# Patient Record
Sex: FEMALE | Race: WHITE | Employment: OTHER | URBAN - METROPOLITAN AREA
[De-identification: names, ages, dates, MRNs, and addresses within clinical notes are randomized per-mention and may not be internally consistent; named-entity substitution may affect disease eponyms.]

---

## 2017-04-03 ENCOUNTER — APPOINTMENT (OUTPATIENT)
Dept: MAMMOGRAPHY | Age: 72
End: 2017-04-03

## 2017-04-03 ENCOUNTER — OFFICE VISIT (OUTPATIENT)
Dept: OBGYN CLINIC | Age: 72
End: 2017-04-03

## 2017-04-03 VITALS
BODY MASS INDEX: 23.24 KG/M2 | WEIGHT: 144.6 LBS | HEART RATE: 72 BPM | DIASTOLIC BLOOD PRESSURE: 105 MMHG | RESPIRATION RATE: 18 BRPM | HEIGHT: 66 IN | SYSTOLIC BLOOD PRESSURE: 141 MMHG

## 2017-04-03 DIAGNOSIS — Z01.419 WELL WOMAN EXAM WITH ROUTINE GYNECOLOGICAL EXAM: Primary | ICD-10-CM

## 2017-04-03 DIAGNOSIS — Z01.419 WELL WOMAN EXAM WITH ROUTINE GYNECOLOGICAL EXAM: ICD-10-CM

## 2017-04-03 DIAGNOSIS — Z12.31 ENCOUNTER FOR SCREENING MAMMOGRAM FOR MALIGNANT NEOPLASM OF BREAST: ICD-10-CM

## 2017-04-03 RX ORDER — CETIRIZINE HCL 10 MG
TABLET ORAL
COMMUNITY
End: 2020-07-04

## 2017-04-03 NOTE — PROGRESS NOTES
Annual exam ages 69+ note    Keli Greer is a No obstetric history on file. ,  66 y.o. female [facility] whose Patient's last menstrual period was 11/15/1990. was on  who presents for her annual checkup. She is having no significant problems. With regard to the Gardasil vaccine, she is older than the age for which it is FDA approved. Menstrual status:    Her periods are nonexistent in flow, patient is post menopausal.    She denies dysmenorrhea. Hormonal status:  She reports no perimenstrual type symptoms. She is not having vasomotor symptoms. The patient is not using any ERT. Sexual history:     reports that she currently engages in sexual activity and has had male partners. Medical conditions:    Since her last annual GYN exam about two years ago, she has not the following changes in her health history: none. Surgical history confirmed with patient. Pap and Mammogram History:    Her most recent Pap smear wasnormal obtained 6 year(s) ago. The patient had her mammogram today in our office. Breast Cancer History/Substance Abuse:    Family Medical/Cancer History:   Family History   Problem Relation Age of Onset    Diabetes Mother     Cancer Sister     Cancer Brother       Tobacco History:  reports that she has never smoked. She has never used smokeless tobacco.  Alcohol Abuse:  reports that she does not drink alcohol. Drug Abuse:  reports that she does not use illicit drugs. Family Medical/Cancer History:   Family History   Problem Relation Age of Onset    Diabetes Mother     Cancer Sister     Cancer Brother       Tobacco History:  reports that she has never smoked. She has never used smokeless tobacco.  Alcohol Abuse:  reports that she does not drink alcohol. Drug Abuse:  reports that she does not use illicit drugs. Osteoporosis History:    Family history does not include a first or second degree relative with osteopenia or osteoporosis.     A bone density scan was obtained 6 years ago and was within normal limits. Current Outpatient Prescriptions   Medication Sig Dispense Refill    cetirizine (ZYRTEC) 10 mg tablet Take  by mouth.  diclofenac EC (VOLTAREN) 75 mg EC tablet TAKE ONE TABLET BY MOUTH TWICE DAILY 60 Tab 2    loratadine (CLARITIN) 10 mg tablet Take 10 mg by mouth daily.  fluticasone (FLONASE) 50 mcg/actuation nasal spray 2 sprays per nostril once a day 1 Bottle 0    omeprazole (PRILOSEC) 40 mg capsule TAKE ONE CAPSULE BY MOUTH EVERY DAY 30 Cap 2     Allergies: Codeine   Social History     Social History    Marital status:      Spouse name: N/A    Number of children: N/A    Years of education: N/A     Occupational History    Not on file.      Social History Main Topics    Smoking status: Never Smoker    Smokeless tobacco: Never Used    Alcohol use No    Drug use: No    Sexual activity: Yes     Partners: Male     Other Topics Concern    Not on file     Social History Narrative     Patient Active Problem List   Diagnosis Code    Right shoulder pain M25.511    Pure hypercholesterolemia E78.00    Esophageal reflux K21.9    Cervical spondylosis M47.812    Chronic venous insufficiency I87.2    Unspecified vitamin D deficiency E55.9       Review of Systems - History obtained from the patient  Constitutional: negative for weight loss, fever, night sweats  HEENT: negative for hearing loss, earache, congestion, snoring, sorethroat  CV: negative for chest pain, palpitations, edema  Resp: negative for cough, shortness of breath, wheezing  GI: negative for change in bowel habits, abdominal pain, black or bloody stools  : negative for frequency, dysuria, hematuria, vaginal discharge  MSK: negative for back pain, joint pain, muscle pain  Breast: negative for breast lumps, nipple discharge, galactorrhea  Skin :negative for itching, rash, hives  Neuro: negative for dizziness, headache, confusion, weakness  Psych: negative for anxiety, depression, change in mood  Heme/lymph: negative for bleeding, bruising, pallor    Physical Exam    Visit Vitals    BP (!) 141/105 (BP 1 Location: Left arm, BP Patient Position: Sitting)    Pulse 72    Resp 18    Ht 5' 5.75\" (1.67 m)    Wt 144 lb 9.6 oz (65.6 kg)    LMP 11/15/1990    BMI 23.52 kg/m2     Constitutional  · Appearance: well-nourished, well developed, alert, in no acute distress    HENT  · Head and Face: appears normal    Neck  · Inspection/Palpation: normal appearance, no masses or tenderness  · Lymph Nodes: no lymphadenopathy present  · Thyroid: gland size normal, nontender, no nodules or masses present on palpation    Chest  · Respiratory Effort: breathing labored  · Auscultation:     Cardiovascular  · Heart:  · Auscultation:     Breasts  · Inspection of Breasts: breasts symmetrical, no skin changes, no discharge present, nipple appearance normal, no skin retraction present  · Palpation of Breasts and Axillae: no masses present on palpation, no breast tenderness  · Axillary Lymph Nodes: no lymphadenopathy present    Gastrointestinal  · Abdominal Examination: abdomen non-tender to palpation, normal bowel sounds, no masses present  · Liver and spleen: no hepatomegaly present, spleen not palpable  · Hernias: no hernias identified    Skin  · General Inspection: no rash, no lesions identified    Neurologic/Psychiatric  · Mental Status:  · Orientation: grossly oriented to person, place and time  · Mood and Affect: mood normal, affect appropriate    Genitourinary  · External Genitalia: normal appearance for age, no discharge present, no tenderness present, no inflammatory lesions present, no masses present, atrophy present  · Vagina: atrophic but otherwise normal vaginal vault without central or paravaginal defects, no discharge present, no inflammatory lesions present, no masses present  · Bladder: non-tender to palpation  · Urethra: appears normal  · Cervix: normal   · Uterus: normal size, shape and consistency  · Adnexa: no adnexal tenderness present, no adnexal masses present  · Perineum: perineum within normal limits, no evidence of trauma, no rashes or skin lesions present  · Anus: anus within normal limits, no hemorrhoids present  · Inguinal Lymph Nodes: no lymphadenopathy present    Assessment:  Routine gynecologic examination  Her current medical status is satisfactory with no evidence of significant gynecologic issues.     Plan:  Counseled re: diet, exercise, healthy lifestyle  Return for yearly wellness visits  Rec annual mammogram

## 2017-04-03 NOTE — PATIENT INSTRUCTIONS

## 2018-06-26 ENCOUNTER — OFFICE VISIT (OUTPATIENT)
Dept: OBGYN CLINIC | Age: 73
End: 2018-06-26

## 2018-06-26 VITALS
SYSTOLIC BLOOD PRESSURE: 129 MMHG | BODY MASS INDEX: 23.3 KG/M2 | DIASTOLIC BLOOD PRESSURE: 74 MMHG | HEART RATE: 78 BPM | WEIGHT: 145 LBS | RESPIRATION RATE: 19 BRPM | HEIGHT: 66 IN

## 2018-06-26 DIAGNOSIS — Z12.4 SCREENING FOR CERVICAL CANCER: Primary | ICD-10-CM

## 2018-06-26 RX ORDER — LATANOPROST 50 UG/ML
1 SOLUTION/ DROPS OPHTHALMIC DAILY
COMMUNITY
End: 2022-02-03 | Stop reason: ALTCHOICE

## 2018-06-26 RX ORDER — ERGOCALCIFEROL 1.25 MG/1
CAPSULE ORAL
COMMUNITY
End: 2021-05-04

## 2018-06-26 NOTE — PROGRESS NOTES
Kobe Chappell is a No obstetric history on file. ,  74 y.o. female [facility] whose LMP was on  who presents for her annual checkup. She is menopausal and amenorrheic. She is having no significant problems. Hormone Status:    She is not having vasomotor symptoms. The patient is not using HRT. She has not had any vaginal bleeding. She reports no gynecologic symptoms. Sexual history:    She  reports that she currently engages in sexual activity and has had male partners. Medical conditions:    Since her last annual GYN exam about one year ago, she has had the following changes in her health history: none. Surgical history confirmed with patient. has a past surgical history that includes hx salpingo-oophorectomy (Left). Pap and Mammogram History:    Her most recent Pap smear was normal obtained 7 year(s) ago. The patient had her mammogram today in our office    Breast Cancer History/Substance Abuse:     She does not have a family history of breast cancer. Osteoporosis History:    Family history does not include a first or second degree relative with osteopenia or osteoporosis. A bone density scan was obtained 8 years ago and revealed normal bone. Past Medical History:   Diagnosis Date    Allergic rhinitis due to other allergen     Arthritis     Hypercholesterolemia      Past Surgical History:   Procedure Laterality Date    HX SALPINGO-OOPHORECTOMY Left     at about age 26     Current Outpatient Prescriptions   Medication Sig Dispense Refill    ergocalciferol (ERGOCALCIFEROL) 50,000 unit capsule Take 1 capsule(s) every week by oral route      latanoprost (XALATAN) 0.005 % ophthalmic solution latanoprost 0.005 % eye drops      cetirizine (ZYRTEC) 10 mg tablet Take  by mouth.       diclofenac EC (VOLTAREN) 75 mg EC tablet TAKE ONE TABLET BY MOUTH TWICE DAILY 60 Tab 2     Allergies: Codeine   Social History     Social History    Marital status:      Spouse name: N/A    Number of children: N/A    Years of education: N/A     Occupational History    Not on file. Social History Main Topics    Smoking status: Never Smoker    Smokeless tobacco: Never Used    Alcohol use No    Drug use: No    Sexual activity: Yes     Partners: Male     Other Topics Concern    Not on file     Social History Narrative     Tobacco History:  reports that she has never smoked. She has never used smokeless tobacco.  Alcohol Abuse:  reports that she does not drink alcohol. Drug Abuse:  reports that she does not use illicit drugs.   Patient Active Problem List   Diagnosis Code    Right shoulder pain M25.511    Pure hypercholesterolemia E78.00    Esophageal reflux K21.9    Cervical spondylosis M47.812    Chronic venous insufficiency I87.2    Unspecified vitamin D deficiency E55.9       Review of Systems - History obtained from the patient  Constitutional: negative for weight loss, fever, night sweats  HEENT: negative for hearing loss, earache, congestion, snoring, sorethroat  CV: negative for chest pain, palpitations, edema  Resp: negative for cough, shortness of breath, wheezing  GI: negative for change in bowel habits, abdominal pain, black or bloody stools  : negative for frequency, dysuria, hematuria, vaginal discharge  MSK: negative for back pain, joint pain, muscle pain  Breast: negative for breast lumps, nipple discharge, galactorrhea  Skin :negative for itching, rash, hives  Neuro: negative for dizziness, headache, confusion, weakness  Psych: negative for anxiety, depression, change in mood  Heme/lymph: negative for bleeding, bruising, pallor    Physical Exam    Visit Vitals    /74 (BP 1 Location: Left arm, BP Patient Position: Sitting)    Pulse 78    Resp 19    Ht 5' 5.75\" (1.67 m)    Wt 145 lb (65.8 kg)    LMP 11/15/1990    BMI 23.58 kg/m2     Constitutional  · Appearance: well-nourished, well developed, alert, in no acute distress    HENT  · Head and Face: appears normal    Neck  · Inspection/Palpation: normal appearance, no masses or tenderness  · Lymph Nodes: no lymphadenopathy present    Chest  · Respiratory Effort: breathing normal    Breasts  · Inspection of Breasts: breasts symmetrical, no skin changes, no discharge present, nipple appearance normal, no skin retraction present  · Palpation of Breasts and Axillae: no masses present on palpation, no breast tenderness  · Axillary Lymph Nodes: no lymphadenopathy present    Gastrointestinal  · Abdominal Examination: abdomen non-tender to palpation, normal bowel sounds, no masses present  · Liver and spleen: no hepatomegaly present, spleen not palpable  · Hernias: no hernias identified    Genitourinary  · External Genitalia: normal appearance for age with atrophy, no discharge present, no tenderness present, no inflammatory lesions present, no masses present  · Vagina:atrophic vaginal vault with pale epithelium and flattening of rugae, without central or paravaginal defects, no discharge present, no inflammatory lesions present, no masses present  · Bladder: non-tender to palpation  · Urethra: appears normal  · Cervix: normal   · Uterus: normal size, shape and consistency  · Adnexa: no adnexal tenderness present, no adnexal masses present  · Perineum: perineum within normal limits, no evidence of trauma, no rashes or skin lesions present  · Anus: anus within normal limits, no hemorrhoids present  · Inguinal Lymph Nodes: no lymphadenopathy present    Skin  · General Inspection: no rash, no lesions identified    Neurologic/Psychiatric  · Mental Status:  · Orientation: grossly oriented to person, place and time  · Mood and Affect: mood normal, affect appropriate    . Assessment:  Routine gynecologic examination  Her current medical status is satisfactory with no evidence of significant gynecologic issues.     Plan:  Counseled re: diet, exercise, healthy lifestyle  Return for yearly wellness visits  Rec annual mammogram

## 2018-11-05 ENCOUNTER — TELEPHONE (OUTPATIENT)
Dept: OBGYN CLINIC | Age: 73
End: 2018-11-05

## 2018-11-05 NOTE — TELEPHONE ENCOUNTER
Calling to discuss billing from visit from 6/26/18 with Lazaro 73- message given to Bruce to call back when she is here.

## 2018-12-14 ENCOUNTER — HOSPITAL ENCOUNTER (OUTPATIENT)
Dept: MAMMOGRAPHY | Age: 73
Discharge: HOME | End: 2018-12-14
Attending: FAMILY MEDICINE
Payer: MEDICARE

## 2018-12-14 DIAGNOSIS — Z78.0 MENOPAUSE: ICD-10-CM

## 2018-12-14 PROCEDURE — 77080 DXA BONE DENSITY AXIAL: CPT

## 2019-12-13 ENCOUNTER — HOSPITAL ENCOUNTER (OUTPATIENT)
Dept: GENERAL RADIOLOGY | Age: 74
Discharge: HOME | End: 2019-12-13
Attending: FAMILY MEDICINE
Payer: MEDICARE

## 2019-12-13 DIAGNOSIS — R52 PAIN: ICD-10-CM

## 2019-12-13 PROCEDURE — 73564 X-RAY EXAM KNEE 4 OR MORE: CPT

## 2020-07-04 ENCOUNTER — HOSPITAL ENCOUNTER (EMERGENCY)
Age: 75
Discharge: HOME | End: 2020-07-04
Attending: EMERGENCY MEDICINE
Payer: MEDICARE

## 2020-07-04 ENCOUNTER — APPOINTMENT (OUTPATIENT)
Dept: GENERAL RADIOLOGY | Age: 75
End: 2020-07-04
Attending: EMERGENCY MEDICINE
Payer: MEDICARE

## 2020-07-04 VITALS
BODY MASS INDEX: 25.49 KG/M2 | WEIGHT: 153 LBS | HEIGHT: 65 IN | RESPIRATION RATE: 16 BRPM | OXYGEN SATURATION: 95 % | SYSTOLIC BLOOD PRESSURE: 129 MMHG | TEMPERATURE: 98.1 F | HEART RATE: 67 BPM | DIASTOLIC BLOOD PRESSURE: 58 MMHG

## 2020-07-04 DIAGNOSIS — S52.502A CLOSED FRACTURE OF DISTAL END OF LEFT RADIUS, UNSPECIFIED FRACTURE MORPHOLOGY, INITIAL ENCOUNTER: Primary | ICD-10-CM

## 2020-07-04 DIAGNOSIS — S52.612A TRAUMATIC CLOSED FRACTURE OF ULNAR STYLOID WITH MINIMAL DISPLACEMENT, LEFT, INITIAL ENCOUNTER: ICD-10-CM

## 2020-07-04 PROCEDURE — 99283 EMERGENCY DEPT VISIT LOW MDM: CPT

## 2020-07-04 PROCEDURE — 73080 X-RAY EXAM OF ELBOW: CPT

## 2020-07-04 PROCEDURE — 73110 X-RAY EXAM OF WRIST: CPT

## 2020-07-04 PROCEDURE — 75810000053 HC SPLINT APPLICATION

## 2020-07-04 NOTE — ED TRIAGE NOTES
Patient tripped while walking in her house and landed on her left arm/wrist. Area is slightly swollen. Arrived with a paint stirrer and gauze wrapped around the wrist. Charge nurse is removing the patient's rings currently.

## 2020-07-04 NOTE — DISCHARGE INSTRUCTIONS
Patient Education        Broken Arm: Care Instructions  Your Care Instructions  Fractures can range from a small, hairline crack, to a bone or bones broken into two or more pieces. Your treatment depends on how bad the break is. Your doctor may have put your arm in a splint or cast to allow it to heal or to keep it stable until you see another doctor. It may take weeks or months for your arm to heal. You can help your arm heal with some care at home. You heal best when you take good care of yourself. Eat a variety of healthy foods, and don't smoke. You may have had a sedative to help you relax. You may be unsteady after having sedation. It can take a few hours for the medicine's effects to wear off. Common side effects of sedation include nausea, vomiting, and feeling sleepy or tired. The doctor has checked you carefully, but problems can develop later. If you notice any problems or new symptoms, get medical treatment right away. Follow-up care is a key part of your treatment and safety. Be sure to make and go to all appointments, and call your doctor if you are having problems. It's also a good idea to know your test results and keep a list of the medicines you take. How can you care for yourself at home? · If the doctor gave you a sedative:  ? For 24 hours, don't do anything that requires attention to detail, such as going to work, making important decisions, or signing any legal documents. It takes time for the medicine's effects to completely wear off.  ? For your safety, do not drive or operate any machinery that could be dangerous. Wait until the medicine wears off and you can think clearly and react easily. · Put ice or a cold pack on your arm for 10 to 20 minutes at a time. Try to do this every 1 to 2 hours for the next 3 days (when you are awake). Put a thin cloth between the ice and your cast or splint. Keep the cast or splint dry. · Follow the cast care instructions your doctor gives you.  If you have a splint, do not take it off unless your doctor tells you to. · Be safe with medicines. Take pain medicines exactly as directed. ? If the doctor gave you a prescription medicine for pain, take it as prescribed. ? If you are not taking a prescription pain medicine, ask your doctor if you can take an over-the-counter medicine. · Prop up your arm on pillows when you sit or lie down in the first few days after the injury. Keep the arm higher than the level of your heart. This will help reduce swelling. · Follow instructions for exercises to keep your arm strong. · Wiggle your fingers and wrist often to reduce swelling and stiffness. When should you call for help? [id number] anytime you think you may need emergency care. For example, call if:  · You are very sleepy and you have trouble waking up. Call your doctor now or seek immediate medical care if:  · You have new or worse nausea or vomiting. · You have new or worse pain. · Your hand or fingers are cool or pale or change color. · Your cast or splint feels too tight. · You have tingling, weakness, or numbness in your hand or fingers. Watch closely for changes in your health, and be sure to contact your doctor if:  · You do not get better as expected. · You have problems with your cast or splint. Where can you learn more? Go to http://aleksandra-dimitry.info/  Enter D940 in the search box to learn more about \"Broken Arm: Care Instructions. \"  Current as of: March 2, 2020               Content Version: 12.5  © 2006-2020 [employer]. Care instructions adapted under license by [employer] (which disclaims liability or warranty for this information). If you have questions about a medical condition or this instruction, always ask your healthcare professional. [address] any warranty or liability for your use of this information.

## 2020-07-04 NOTE — ED PROVIDER NOTES
HPI       65y R-handed F here with L wrist pain. Yesterday (about 18 hours ago) she lost her footing and fell. She broke her fall with her L wrist. Now with ongoing pain/swelling. Also reports R elbow pain that is worse since yesterday although was having some prior to the fall as well. No weakness or numbness. No syncope. Didn't hit her head or have LOC with the fall.  is here with here and confirms the history.     Past Medical History:   Diagnosis Date    Allergic rhinitis due to other allergen     Arthritis     Hypercholesterolemia        Past Surgical History:   Procedure Laterality Date    HX SALPINGO-OOPHORECTOMY Left     at about age 26         Family History:   Problem Relation Age of Onset    Diabetes Mother     Cancer Sister     Cancer Brother        Social History     Socioeconomic History    Marital status:      Spouse name: Not on file    Number of children: Not on file    Years of education: Not on file    Highest education level: Not on file   Occupational History    Not on file   Social Needs    Financial resource strain: Not on file    Food insecurity     Worry: Not on file     Inability: Not on file    Transportation needs     Medical: Not on file     Non-medical: Not on file   Tobacco Use    Smoking status: Never Smoker    Smokeless tobacco: Never Used   Substance and Sexual Activity    Alcohol use: No    Drug use: No    Sexual activity: Yes     Partners: Male   Lifestyle    Physical activity     Days per week: Not on file     Minutes per session: Not on file    Stress: Not on file   Relationships    Social connections     Talks on phone: Not on file     Gets together: Not on file     Attends [employer] service: Not on file     Active member of club or organization: Not on file     Attends meetings of clubs or organizations: Not on file     Relationship status: Not on file    Intimate partner violence     Fear of current or ex partner: Not on file Emotionally abused: Not on file     Physically abused: Not on file     Forced sexual activity: Not on file   Other Topics Concern    Not on file   Social History Narrative    Not on file         ALLERGIES: Codeine    Review of Systems   Review of Systems   Constitutional: (-) weight loss. HEENT: (-) stiff neck   Eyes: (-) discharge. Respiratory: (-) cough. Cardiovascular: (-) syncope. Gastrointestinal: (-) blood in stool. Genitourinary: (-) hematuria. Musculoskeletal: (-) myalgias. Neurological: (-) seizure. Skin: (-) petechiae  Lymph/Immunologic: (-) enlarged lymph nodes  All other systems reviewed and are negative. Vitals:    07/04/20 1741   BP: 129/58   Pulse: 67   Resp: 16   Temp: 98.1 °F (36.7 °C)   SpO2: 95%   Weight: 69.4 kg (153 lb)   Height: 5' 5\" (1.651 m)            Physical Exam Nursing note and vitals reviewed. Constitutional: oriented to person, place, and time. appears well-developed and well-nourished. No distress. Head: Normocephalic and atraumatic. Sclera anicteric  Nose: No rhinorrhea  Mouth/Throat: Oropharynx is clear and moist. Pharynx normal  Eyes: Conjunctivae are normal. Pupils are equal, round, and reactive to light. Right eye exhibits no discharge. Left eye exhibits no discharge. Neck: Painless normal range of motion. Neck supple. No LAD. Cardiovascular: Normal rate, regular rhythm, normal heart sounds and intact distal pulses. Exam reveals no gallop and no friction rub. No murmur heard. Pulmonary/Chest:  No respiratory distress. No wheezes. No rales. No rhonchi. No increased work of breathing. No accessory muscle use. Good air exchange throughout. Abdominal: soft, non-tender, no rebound or guarding. No hepatosplenomegaly. Normal bowel sounds throughout. Back: no tenderness to palpation, no deformities, no CVA tenderness  Extremities/Musculoskeletal: L wrist with swelling and pain. R elbow with swelling.  Can give a thumbs up, spread her fingers apart, and make OK sign. Distal extremities are neurovasc intact. Lymphadenopathy:   No adenopathy. Neurological:  Alert and oriented to person, place, and time. Coordination normal. CN 2-12 intact. Motor and sensory function intact. Skin: Skin is warm and dry. No rash noted. No pallor. MDM 65y F here with L wrist and R elbow pain since a fall yesterday. neurovasc intact. Will check xrays.        Procedures

## 2020-07-04 NOTE — ED NOTES
See phone message for result note The patient was discharged home by Dr Aleks Roth in stable condition. The patient is alert and oriented, in no respiratory distress. The patient's diagnosis, condition and treatment were explained. The patient expressed understanding. A discharge plan has been developed. A  was not involved in the process. Aftercare instructions were given. Pt ambulatory out of the ED with .

## 2020-12-28 ENCOUNTER — TRANSCRIBE ORDER (OUTPATIENT)
Dept: SCHEDULING | Age: 75
End: 2020-12-28

## 2020-12-28 DIAGNOSIS — M81.0 SENILE OSTEOPOROSIS: ICD-10-CM

## 2020-12-28 DIAGNOSIS — Z12.31 SCREENING MAMMOGRAM FOR HIGH-RISK PATIENT: Primary | ICD-10-CM

## 2021-03-08 ENCOUNTER — HOSPITAL ENCOUNTER (OUTPATIENT)
Dept: LAB | Age: 76
Discharge: HOME | End: 2021-03-08
Payer: MEDICARE

## 2021-03-08 ENCOUNTER — TRANSCRIBE ORDER (OUTPATIENT)
Dept: REGISTRATION | Age: 76
End: 2021-03-08

## 2021-03-08 DIAGNOSIS — U07.1 COVID-19: ICD-10-CM

## 2021-03-08 DIAGNOSIS — Z20.822 ENCOUNTER FOR PREOPERATIVE SCREENING LABORATORY TESTING FOR COVID-19 VIRUS: Primary | ICD-10-CM

## 2021-03-08 DIAGNOSIS — Z01.812 ENCOUNTER FOR PREOPERATIVE SCREENING LABORATORY TESTING FOR COVID-19 VIRUS: ICD-10-CM

## 2021-03-08 DIAGNOSIS — Z01.812 ENCOUNTER FOR PREOPERATIVE SCREENING LABORATORY TESTING FOR COVID-19 VIRUS: Primary | ICD-10-CM

## 2021-03-08 DIAGNOSIS — Z20.822 ENCOUNTER FOR PREOPERATIVE SCREENING LABORATORY TESTING FOR COVID-19 VIRUS: ICD-10-CM

## 2021-03-08 PROCEDURE — U0005 INFEC AGEN DETEC AMPLI PROBE: HCPCS

## 2021-03-08 RX ORDER — ALENDRONATE SODIUM 35 MG/1
70 TABLET ORAL
COMMUNITY

## 2021-03-08 NOTE — PERIOP NOTES
[address]  Endoscopy Preprocedure Instructions      1. On the day of your surgery, please report to registration located on the 2nd floor of the  [facility]. yes    2. You must have a responsible adult to drive you to the hospital, stay at the hospital during your procedure and drive you home. If they leave your procedure will not be started (no exceptions). yes    3. Do not have anything to eat or drink (including water, gum, mints, coffee, and juice) after midnight. This does not apply to the medications you were instructed to take by your physician. yesIf you are currently taking Plavix, Coumadin, Aspirin, or other blood-thinning agents, contact your physician for special instructions. not applicable. 4. If you are having a procedure that requires bowel prep: We recommend that you have only clear liquids the day before your procedure and begin your bowel prep by 5:00 pm.  You may continue to drink clear liquids until midnight. If for any reason you are not able to complete your prep please notify your physician immediately. yes    5. Have a list of all current medications, including vitamins, herbal supplements and any other over the counter medications. yes    6. If you wear glasses, contacts, dentures and/or hearing aids, they may be removed prior to procedure, please bring a case to store them in. yes    7. You should understand that if you do not follow these instructions your procedure may be cancelled. If your physical condition changes (I.e. fever, cold or flu) please contact your doctor as soon as possible. 8. It is important that you be on time.   If for any reason you are unable to keep your appointment please call [phone] the day of or your physicians office prior to your scheduled procedure

## 2021-03-09 LAB — SARS-COV-2, COV2NT: NOT DETECTED

## 2021-03-12 ENCOUNTER — HOSPITAL ENCOUNTER (OUTPATIENT)
Age: 76
Setting detail: OUTPATIENT SURGERY
Discharge: HOME | End: 2021-03-12
Attending: SPECIALIST | Admitting: SPECIALIST
Payer: MEDICARE

## 2021-03-12 ENCOUNTER — ANESTHESIA EVENT (OUTPATIENT)
Dept: ENDOSCOPY | Age: 76
End: 2021-03-12
Payer: MEDICARE

## 2021-03-12 ENCOUNTER — ANESTHESIA (OUTPATIENT)
Dept: ENDOSCOPY | Age: 76
End: 2021-03-12
Payer: MEDICARE

## 2021-03-12 VITALS
OXYGEN SATURATION: 100 % | DIASTOLIC BLOOD PRESSURE: 85 MMHG | SYSTOLIC BLOOD PRESSURE: 145 MMHG | WEIGHT: 145.5 LBS | HEART RATE: 67 BPM | HEIGHT: 65 IN | TEMPERATURE: 97.6 F | BODY MASS INDEX: 24.24 KG/M2 | RESPIRATION RATE: 14 BRPM

## 2021-03-12 PROCEDURE — 77030021593 HC FCPS BIOP ENDOSC BSC -A: Performed by: SPECIALIST

## 2021-03-12 PROCEDURE — 76040000019: Performed by: SPECIALIST

## 2021-03-12 PROCEDURE — 74011250636 HC RX REV CODE- 250/636: Performed by: NURSE ANESTHETIST, CERTIFIED REGISTERED

## 2021-03-12 PROCEDURE — 74011000250 HC RX REV CODE- 250: Performed by: NURSE ANESTHETIST, CERTIFIED REGISTERED

## 2021-03-12 PROCEDURE — C1726 CATH, BAL DIL, NON-VASCULAR: HCPCS | Performed by: SPECIALIST

## 2021-03-12 PROCEDURE — 88305 TISSUE EXAM BY PATHOLOGIST: CPT

## 2021-03-12 PROCEDURE — 74011250636 HC RX REV CODE- 250/636: Performed by: SPECIALIST

## 2021-03-12 PROCEDURE — 76060000031 HC ANESTHESIA FIRST 0.5 HR: Performed by: SPECIALIST

## 2021-03-12 PROCEDURE — 2709999900 HC NON-CHARGEABLE SUPPLY: Performed by: SPECIALIST

## 2021-03-12 RX ORDER — ESCITALOPRAM OXALATE 10 MG/1
10 TABLET ORAL DAILY
COMMUNITY
End: 2021-08-17 | Stop reason: SDUPTHER

## 2021-03-12 RX ORDER — MIDAZOLAM HYDROCHLORIDE 1 MG/ML
.25-5 INJECTION, SOLUTION INTRAMUSCULAR; INTRAVENOUS AS NEEDED
Status: DISCONTINUED | OUTPATIENT
Start: 2021-03-12 | End: 2021-03-12 | Stop reason: HOSPADM

## 2021-03-12 RX ORDER — SODIUM CHLORIDE 9 MG/ML
INJECTION, SOLUTION INTRAVENOUS
Status: DISCONTINUED | OUTPATIENT
Start: 2021-03-12 | End: 2021-03-12 | Stop reason: HOSPADM

## 2021-03-12 RX ORDER — PROPOFOL 10 MG/ML
INJECTION, EMULSION INTRAVENOUS
Status: DISCONTINUED | OUTPATIENT
Start: 2021-03-12 | End: 2021-03-12 | Stop reason: HOSPADM

## 2021-03-12 RX ORDER — FLUMAZENIL 0.1 MG/ML
0.2 INJECTION INTRAVENOUS
Status: DISCONTINUED | OUTPATIENT
Start: 2021-03-12 | End: 2021-03-12 | Stop reason: HOSPADM

## 2021-03-12 RX ORDER — DEXTROMETHORPHAN/PSEUDOEPHED 2.5-7.5/.8
1.2 DROPS ORAL
Status: DISCONTINUED | OUTPATIENT
Start: 2021-03-12 | End: 2021-03-12 | Stop reason: HOSPADM

## 2021-03-12 RX ORDER — LIDOCAINE HYDROCHLORIDE 20 MG/ML
INJECTION, SOLUTION EPIDURAL; INFILTRATION; INTRACAUDAL; PERINEURAL AS NEEDED
Status: DISCONTINUED | OUTPATIENT
Start: 2021-03-12 | End: 2021-03-12 | Stop reason: HOSPADM

## 2021-03-12 RX ORDER — PROPOFOL 10 MG/ML
INJECTION, EMULSION INTRAVENOUS AS NEEDED
Status: DISCONTINUED | OUTPATIENT
Start: 2021-03-12 | End: 2021-03-12 | Stop reason: HOSPADM

## 2021-03-12 RX ORDER — SODIUM CHLORIDE 9 MG/ML
50 INJECTION, SOLUTION INTRAVENOUS CONTINUOUS
Status: DISCONTINUED | OUTPATIENT
Start: 2021-03-12 | End: 2021-03-12 | Stop reason: HOSPADM

## 2021-03-12 RX ORDER — NALOXONE HYDROCHLORIDE 0.4 MG/ML
0.4 INJECTION, SOLUTION INTRAMUSCULAR; INTRAVENOUS; SUBCUTANEOUS
Status: DISCONTINUED | OUTPATIENT
Start: 2021-03-12 | End: 2021-03-12 | Stop reason: HOSPADM

## 2021-03-12 RX ORDER — FENTANYL CITRATE 50 UG/ML
25 INJECTION, SOLUTION INTRAMUSCULAR; INTRAVENOUS AS NEEDED
Status: DISCONTINUED | OUTPATIENT
Start: 2021-03-12 | End: 2021-03-12 | Stop reason: HOSPADM

## 2021-03-12 RX ADMIN — SODIUM CHLORIDE: 9 INJECTION, SOLUTION INTRAVENOUS at 11:19

## 2021-03-12 RX ADMIN — LIDOCAINE HYDROCHLORIDE 60 MG: 20 INJECTION, SOLUTION INTRAVENOUS at 11:19

## 2021-03-12 RX ADMIN — PROPOFOL 70 MG: 10 INJECTION, EMULSION INTRAVENOUS at 11:20

## 2021-03-12 RX ADMIN — SODIUM CHLORIDE 50 ML/HR: 9 INJECTION, SOLUTION INTRAVENOUS at 11:08

## 2021-03-12 RX ADMIN — PROPOFOL 100 MCG/KG/MIN: 10 INJECTION, EMULSION INTRAVENOUS at 11:20

## 2021-03-12 NOTE — PROGRESS NOTES
Doni Blanchard  1945  [id number]    Situation:  Verbal report received from: Kendal Pabon RN   Procedure: Procedure(s):  ESOPHAGOGASTRODUODENOSCOPY (EGD)  ESOPHAGOGASTRODUODENAL (EGD) BIOPSY  ESOPHAGEAL DILATION    Background:    Preoperative diagnosis: DYSPHAGIA, EPIGASTRIC PAIN  Postoperative diagnosis: 1- Esophageal Ring. :  Dr. Yelitza Walden  Assistant(s): Endoscopy Technician-1: Jasen Rivera  Endoscopy RN-1: Abril Felipe    Specimens:   ID Type Source Tests Collected by Time Destination   1 : EG Junction Biopsy. Preservative   Kay Wiseman MD 3/12/2021 1127 Pathology     H. Pylori  no    Assessment:    Anesthesia gave intra-procedure sedation and medications, see anesthesia flow sheet no    Intravenous fluids: NS@ KVO     Vital signs stable     Abdominal assessment: round and soft     Recommendation:  Discharge patient per MD order.   Return to floor  Family or Friend   Permission to share finding with family or friend yes

## 2021-03-12 NOTE — ANESTHESIA PREPROCEDURE EVALUATION
Relevant Problems   GASTROINTESTINAL   (+) Esophageal reflux       Anesthetic History               Review of Systems / Medical History  Patient summary reviewed and nursing notes reviewed    Pulmonary          Shortness of breath         Neuro/Psych         Psychiatric history    Comments: Anxiety/depression  Occasional insomnia  Glaucoma  Mild dementia Cardiovascular                  Exercise tolerance: >4 METS     GI/Hepatic/Renal               Comments: Dysphagia Endo/Other        Arthritis     Other Findings              Physical Exam    Airway  Mallampati: II    Neck ROM: normal range of motion        Cardiovascular    Rhythm: regular  Rate: normal         Dental  No notable dental hx       Pulmonary  Breath sounds clear to auscultation               Abdominal         Other Findings            Anesthetic Plan    ASA: 3  Anesthesia type: MAC            Anesthetic plan and risks discussed with: Patient      Informed consent obtained.

## 2021-03-12 NOTE — PERIOP NOTES
Received report from Yuly Chilel CRNA. See anesthesia record. Patient taken to post-recovery. Post-recovery report given to Shayna Burdick RN. Patient's ABD remains soft and non-tender post procedure. Pt has no complaints at this time and tolerated the procedure well. Endoscope was pre-cleaned at bedside immediately following procedure by Mirella. CRE balloon dilatation of the esophagus   18 mm Balloon inflated to 3 ATMs and held for 10 seconds. 19 mm Balloon inflated to 4.5 ATMs and held for 10 seconds. 20 mm Balloon inflated to 6 ATMs and held for 10 seconds. No subcutaneous crepitus of the chest or cervical region was noted post dilatation.

## 2021-03-12 NOTE — INTERVAL H&P NOTE
Pre-Endoscopy H&P Update  Chief complaint/HPI/ROS:  The indication for the procedure, the patient's history and the patient's current medications are reviewed prior to the procedure and that data is reported on the H&P to which this document is attached. Any significant complaints with regard to organ systems will be noted, and if not mentioned then a review of systems is not contributory. Past Medical History:   Diagnosis Date    Allergic rhinitis due to other allergen     Arthritis     GERD (gastroesophageal reflux disease)     Hypercholesterolemia     Psychiatric disorder     anxiety    Seizures ([address]     \"with stress\" last was 2017      Past Surgical History:   Procedure Laterality Date    HX SALPINGO-OOPHORECTOMY Left     at about age 26     [address]      right wrist fracture. Social   Social History     Tobacco Use    Smoking status: Never Smoker    Smokeless tobacco: Never Used   Substance Use Topics    Alcohol use: No      Family History   Problem Relation Age of Onset    Diabetes Mother     Cancer Sister     Cancer Brother       Allergies   Allergen Reactions    Codeine Other (comments)     Unknown        Prior to Admission Medications   Prescriptions Last Dose Informant Patient Reported? Taking? alendronate (FOSAMAX) 35 mg tablet 3/5/2021 at Unknown time  Yes Yes   Sig: Take 70 mg by mouth every seven (7) days. diclofenac EC (VOLTAREN) 75 mg EC tablet 3/11/2021 at Unknown time  No Yes   Sig: TAKE ONE TABLET BY MOUTH TWICE DAILY   ergocalciferol (ERGOCALCIFEROL) 50,000 unit capsule Not Taking at Unknown time  Yes No   Sig: Take 1 capsule(s) every week by oral route   escitalopram oxalate (LEXAPRO) 10 mg tablet 3/10/2021 at Unknown time  Yes Yes   Sig: Take 10 mg by mouth daily.  Indications: anxiousness associated with depression   latanoprost (XALATAN) 0.005 % ophthalmic solution 3/11/2021 at Unknown time  Yes Yes   Sig: latanoprost 0.005 % eye drops   timoloL (BETIMOL) 0.25 % ophthalmic solution 3/11/2021 at Unknown time  Yes Yes   Sig: Administer 1-2 Drops to both eyes two (2) times a day. use in affected eye(s)      Facility-Administered Medications: None       PHYSICAL EXAM:  The patient is examined immediately prior to the procedure. Visit Vitals  BP (!) 153/71   Pulse 67   Temp 97.8 °F (36.6 °C)   Resp 18   Ht 5' 5\" (1.651 m)   Wt 66 kg (145 lb 8.1 oz)   SpO2 100%   Breastfeeding No   BMI 24.21 kg/m²     Gen: Appears comfortable, no distress. Pulm: comfortable respirations with no abnormal audible breath sounds  HEART: well perfused, no abnormal audible heart sounds  GI: abdomen flat. PLAN:  Informed consent discussion held, patient afforded an opportunity to ask questions and all questions answered. After being advised of the risks, benefits, and alternatives, the patient requested that we proceed and indicated so on a written consent form. Will proceed with procedure as planned.   Nicola Delgado MD

## 2021-03-12 NOTE — H&P
Date: 2021 3:15 PM   Patient Name: Nancy Scales   Account #: [id number]    Gender: Female    (age): 1945 (66)       Provider:     Magalie Flor. Maliha Rowland MD        Referring Physician:     Wayland Nissen. [address]  [phone] (phone)  [phone] (fax)        Chief Complaint: I Have this pain and trouble swallowing. History of Present Illness:   66year old who is up to date with colon cancer screening having had colonoscopy about 5 years ago describes bloating and upper abdominal discomfort, randomly, associated with a history of heartburn and periodic problems with food bolus arrest in the mid chest.  When this happens sometimes she'll try to drink water to alleviate and it will regurgitate and cause her to cough and sputter. We negotiated diagnostic, potentially therapeutic upper GI endoscopy. ? 66year old who is up to date with colon cancer screening having had colonoscopy about 5 years ago describes bloating and upper abdominal discomfort, randomly, associated with a history of heartburn and periodic problems with food bolus arrest in the mid chest.  When this happens sometimes she'll try to drink water to alleviate and it will regurgitate and cause her to cough and sputter. We negotiated diagnostic, potentially therapeutic upper GI endoscopy. ?        Past Medical History      Medical Conditions: Arthritis  Glaucoma  High cholesterol   Surgical Procedures: No Prior Procedures   Dx Studies: No Prior Diagnostic Studies   Medications: alendronate 70 mg TAKE 1 TABLET BY MOUTH ONCE A WEEK AS DIRECTED  escitalopram oxalate 10 mg TAKE 1 TABLET BY MOUTH ONCE DAILY  latanoprost 0.005% Instill as directed  rosuvastatin 10 mg TAKE 1 TABLET BY MOUTH ONCE DAILY  timolol maleate 0.5% INSTILL 1 DROP INTO EACH EYE TWICE DAILY   Allergies: Codeine Sulfate   Immunizations: Influenza, seasonal, injectable, 10/2020  Hep A  Hep B  Pneumococcal conjugate PCV 13 Social History      Alcohol: None   Tobacco: Never smoker   Drugs: None   Exercise: None   Caffeine: Daily. Marital Status:          Occupation:               Family History No history of Colon Cancer, Colon Polyps, Esophogeal Cancer, IBD (Crohn's or UC)  Sister: Diagnosed with cervical cancer;   Brother: Diagnosed with Liver disease;       Review of Systems:   Cardiovascular: Presents suffers from peripheral edema. Denies chest pain, irregular heart beat, palpitations, syncope, Sweats. Constitutional: Presents suffers from fatigue. Denies fever, loss of appetite, weight gain, weight loss. ENMT: Denies nose bleeds, sore throat, hearing loss. Endocrine: Presents suffers from heat intolerance. Denies excessive thirst.   Eyes: Denies loss of vision. Gastrointestinal: Presents suffers from abdominal pain, constipation, Bloating/gas, stomach cramps, dysphagia. Denies abdominal swelling, change in bowel habits, diarrhea, heartburn, jaundice, nausea, rectal bleeding, vomiting, rectal pain, Stool incontinence, hematemesis. Genitourinary: Denies dark urine, dysuria, frequent urination, hematuria, incontinence. Hematologic/Lymphatic: Presents suffers from easy bruising. Denies prolonged bleeding. Integumentary: Presents suffers from sun sensitivity. Denies itching, rashes. Musculoskeletal: Presents suffers from arthritis, stiffness. Denies back pain, gout, joint pain, muscle weakness. Neurological: Presents suffers from memory loss. Denies dizziness, fainting, frequent headaches. Psychiatric: Presents suffers from nervousness. Denies anxiety, depression, difficulty sleeping, hallucinations, panic attacks, paranoia. Respiratory: Presents suffers from cough, dyspnea. Denies wheezing.       Vital Signs:   BP  (mmHg)  Pulse  (ppm) Rhythm Weight (lbs/oz) Height (ft/in) BMI Temp   149/90 60 Regular 151 /  5 / 6 24.37 97.5 (F)      Physical Exam:   Constitutional:      Appearance: No distress, appears comfortable. Communication: Understands/receives spoken information. Skin:      Inspection: No rash, no jaundice. Head/face: Inspection: Normacephalic, atraumatic. Eyes:      Conjunctivae/lids: Normal.   Pupils/irises: Pupils equal, round and normal.   ENMT:      External: Normal.   Hearing: Normal.   Neck:      Neck: Normal appearance, trachea midline. Jugular veins: No JVD noted. Respiratory:      Effort: Normal respiratory effort, comfortable, speaks in complete sentences. Lab Results: No Electronic Results      Impressions: Esophageal dysphagia  Epigastric pain? ? Esophageal dysphagia? ?  ? ? Epigastric pain? Assessment: ?         Plan: Upper Endoscopy? ? Upper Endoscopy? Risk & Medical Necessity: The level of medical decision making for this visit is low. The number and complexity of problems addressed are moderate. The amount and/or complexity of data to be reviewed and analyzed is low. The risk of complications and/or morbidity or mortality of patient management is low. Notes:              Jael Guevara. Abbie French MD     Electronically signed on 2021 3:38:02 PM by Jael Guevara. Abbie French, [address] [place] [address], MRN [id number],  1945 First Visit, 2021                                                                                                                                                        New     Modify          Delete     Delete all     Edit Wording          Sign     [email or web address]

## 2021-03-12 NOTE — ANESTHESIA POSTPROCEDURE EVALUATION
Procedure(s):  ESOPHAGOGASTRODUODENOSCOPY (EGD)  ESOPHAGOGASTRODUODENAL (EGD) BIOPSY  ESOPHAGEAL DILATION. MAC    Anesthesia Post Evaluation      Multimodal analgesia: multimodal analgesia not used between 6 hours prior to anesthesia start to PACU discharge  Patient location during evaluation: PACU  Patient participation: complete - patient participated  Level of consciousness: awake and alert  Pain score: 0  Pain management: adequate  Airway patency: patent  Anesthetic complications: no  Cardiovascular status: hemodynamically stable and acceptable  Respiratory status: acceptable  Hydration status: acceptable  Comments: Patient seen and evaluated; no concerns. Post anesthesia nausea and vomiting:  none      INITIAL Post-op Vital signs:   Vitals Value Taken Time   /81 03/12/21 1146   Temp 36.4 °C (97.6 °F) 03/12/21 1139   Pulse 65 03/12/21 1148   Resp 17 03/12/21 1148   SpO2 99 % 03/12/21 1148   Vitals shown include unvalidated device data.

## 2021-03-12 NOTE — DISCHARGE INSTRUCTIONS
[address] [facility] LOIS Guallpa MD  [phone]      March 12, 2021     [address]: 1945    ENDOSCOPY DISCHARGE INSTRUCTIONS    If there is redness at IV site you should apply warm compress to area. If redness or soreness persist contact Dr. Brittany Guallpa' or your primary care doctor. Gaseous discomfort may develop, but walking, belching will help relieve this. You may not operate a vehicle for 12 hours  You may not operate machinery or dangerous appliances for rest of today  You may not drink alcoholic beverages for 12 hours  Avoid making any critical decisions for 24 hours    DIET:  You may resume your normal diet, but some patients find that heavy or large meals may lead to indigestion or vomiting. I suggest a light meal as first food intake. MEDICATIONS:  The use of some over-the-counter pain medication may lead to bleeding after biopsies or other procedures you may have had done. Tylenol (also called acetaminophen) is safe to take and will not lead to bleeding. Based on the procedure you had today you may not safely take aspirin or aspirin-like products for the next ten (10) days. ACTIVITY:  You may resume your normal household activities, but it is recommended that you spend the remainder of the day resting -  avoid any strenuous activity. CALL DR. Yuridia Tierney' OFFICE IF:  Increasing pain, nausea, vomiting  Abdominal distension (swelling)  Significant new or increased bleeding (oral or rectal)  Fever/Chills  Chest pain/shortness of breath                   Additional instructions:   No aspirin 10 days. We found a narrowing of the lower esophagus that looks like from acid reflux. I stretched that today. You should take acid reflux medication for 2 months. I sent a prescription to your pharmacy. It was an honor to be your doctor today.   Please let me or my office staff know if you have any feedback about today's procedure.     Hilario Aguilar MD

## 2021-03-12 NOTE — PROGRESS NOTES
Endoscopy discharge instructions have been reviewed and given to patient. The patient verbalized understanding and acceptance of instructions. Dr. Eva Hurtado discussed with patient procedure findings and next steps.

## 2021-03-12 NOTE — PROCEDURES
[address]  [phone]      2021    Esophagogastroduodenoscopy (EGD) Procedure Note  Ramya Urrutia  : 1945  [facility] Medical Record Number: [id number]      Indications:    Dysphagia/odynophagia  Referring Physician:  Malissa Malloy MD  Anesthesia/Sedation:  Conscious sedation/deep sedation/monitored anesthesia -- see notes. Endoscopist:  Dr. Kevyn Witt  Complications:  None  Estimated Blood Loss:  None    Permit:  The indications, risks, benefits and alternatives were reviewed with the patient or their decision maker who was provided an opportunity to ask questions and all questions were answered. The specific risks of esophagogastroduodenoscopy with conscious sedation were reviewed, including but not limited to anesthetic complication, bleeding, adverse drug reaction, missed lesion, infection, IV site reactions, and intestinal perforation which would lead to the need for surgical repair. Alternatives to EGD including radiographic imaging, observation without testing, or laboratory testing were reviewed as well as the limitations of those alternatives discussed. After considering the options and having all their questions answered, the patient or their decision maker provided both verbal and written consent to proceed. Procedure in Detail:  After obtaining informed consent, positioning of the patient in the left lateral decubitus position, and conduction of a pre-procedure pause or \"time out\" the endoscope was introduced into the mouth and advanced to the duodenum. A careful inspection was made, and findings or interventions are described below. Findings:   Esophagus:Esophageal ring acquired at EG junction, cold forceps biopsies then 20mm balloon dilation.   Stomach: normal   Duodenum/jejunum: normal      Specimens: See above    Impression: Esophageal ring, likely r/t GERD           Recommendations:  -Acid suppression with a proton pump inhibitor. , -Await pathology. Thank you for entrusting me with this patient's care. Please do not hesitate to contact me with any questions or if I can be of assistance with any of your other patients' GI needs. Signed By: Hilario Aguilar MD                        March 12, 2021     Surgical assistant none. Implants none unless specified.

## 2021-03-30 ENCOUNTER — HOSPITAL ENCOUNTER (OUTPATIENT)
Dept: MAMMOGRAPHY | Age: 76
Discharge: HOME | End: 2021-03-30
Attending: FAMILY MEDICINE
Payer: MEDICARE

## 2021-03-30 DIAGNOSIS — Z12.31 SCREENING MAMMOGRAM FOR HIGH-RISK PATIENT: ICD-10-CM

## 2021-03-30 DIAGNOSIS — M81.0 SENILE OSTEOPOROSIS: ICD-10-CM

## 2021-03-30 PROCEDURE — 77067 SCR MAMMO BI INCL CAD: CPT

## 2021-03-30 PROCEDURE — 77080 DXA BONE DENSITY AXIAL: CPT

## 2021-03-31 ENCOUNTER — TRANSCRIBE ORDER (OUTPATIENT)
Dept: SCHEDULING | Age: 76
End: 2021-03-31

## 2021-03-31 DIAGNOSIS — R92.8 ABNORMAL MAMMOGRAM: Primary | ICD-10-CM

## 2021-04-12 ENCOUNTER — HOSPITAL ENCOUNTER (OUTPATIENT)
Dept: MAMMOGRAPHY | Age: 76
Discharge: HOME | End: 2021-04-12
Attending: FAMILY MEDICINE
Payer: MEDICARE

## 2021-04-12 DIAGNOSIS — R92.8 ABNORMAL MAMMOGRAM: ICD-10-CM

## 2021-04-12 PROCEDURE — 77065 DX MAMMO INCL CAD UNI: CPT

## 2021-04-12 PROCEDURE — 76642 ULTRASOUND BREAST LIMITED: CPT

## 2021-04-16 ENCOUNTER — TRANSCRIBE ORDER (OUTPATIENT)
Dept: MAMMOGRAPHY | Age: 76
End: 2021-04-16

## 2021-04-16 ENCOUNTER — HOSPITAL ENCOUNTER (OUTPATIENT)
Dept: MAMMOGRAPHY | Age: 76
Discharge: HOME | End: 2021-04-16
Attending: FAMILY MEDICINE
Payer: MEDICARE

## 2021-04-16 DIAGNOSIS — N63.12 MASS OF UPPER INNER QUADRANT OF RIGHT BREAST: ICD-10-CM

## 2021-04-16 DIAGNOSIS — N63.12 MASS OF UPPER INNER QUADRANT OF RIGHT BREAST: Primary | ICD-10-CM

## 2021-04-16 PROCEDURE — 88360 TUMOR IMMUNOHISTOCHEM/MANUAL: CPT

## 2021-04-16 PROCEDURE — 88342 IMHCHEM/IMCYTCHM 1ST ANTB: CPT

## 2021-04-16 PROCEDURE — 77065 DX MAMMO INCL CAD UNI: CPT

## 2021-04-16 PROCEDURE — 88305 TISSUE EXAM BY PATHOLOGIST: CPT

## 2021-04-16 PROCEDURE — 19083 BX BREAST 1ST LESION US IMAG: CPT

## 2021-04-16 PROCEDURE — 74011000250 HC RX REV CODE- 250: Performed by: RADIOLOGY

## 2021-04-16 PROCEDURE — 88341 IMHCHEM/IMCYTCHM EA ADD ANTB: CPT

## 2021-04-16 PROCEDURE — 88313 SPECIAL STAINS GROUP 2: CPT

## 2021-04-16 RX ORDER — LIDOCAINE HYDROCHLORIDE 10 MG/ML
12 INJECTION INFILTRATION; PERINEURAL
Status: COMPLETED | OUTPATIENT
Start: 2021-04-16 | End: 2021-04-16

## 2021-04-16 RX ORDER — SODIUM BICARBONATE 42 MG/ML
3 INJECTION, SOLUTION INTRAVENOUS
Status: COMPLETED | OUTPATIENT
Start: 2021-04-16 | End: 2021-04-16

## 2021-04-16 RX ADMIN — SODIUM BICARBONATE 126 MG: 42 SOLUTION INTRAVENOUS at 14:25

## 2021-04-16 RX ADMIN — LIDOCAINE HYDROCHLORIDE 12 ML: 10 INJECTION, SOLUTION INFILTRATION; PERINEURAL at 14:25

## 2021-04-16 NOTE — PROGRESS NOTES
Patient tolerated procedure well. Minimal bleeding noted. Pressure held to biopsy site for 5 minutes. No oozing, bleeding or hematoma noted. Patient sent for post biopsy mammogram.  Dressing remained dry and intact  Post biopsy discharge instructions reviewed with patient and patient given a copy. Ice pack applied over transparent dressing. Patient expects to be contacted by phone with pathology result.

## 2021-04-20 NOTE — PROGRESS NOTES
Pathology results in and reviewed by Dr Juan Alberto Enrique and faxed to Dr Doug Stephenson's office. Patient contacted with pathology results and surgical consult appointment with Namrata Miguel. Patient made aware of appointment date time and location.

## 2021-04-21 PROBLEM — C50.911 BREAST CANCER, RIGHT (HCC): Status: ACTIVE | Noted: 2021-04-21

## 2021-04-22 ENCOUNTER — OFFICE VISIT (OUTPATIENT)
Dept: SURGERY | Age: 76
End: 2021-04-22
Payer: MEDICARE

## 2021-04-22 ENCOUNTER — TELEPHONE (OUTPATIENT)
Dept: SURGERY | Age: 76
End: 2021-04-22

## 2021-04-22 VITALS
HEIGHT: 65 IN | SYSTOLIC BLOOD PRESSURE: 133 MMHG | BODY MASS INDEX: 24.16 KG/M2 | DIASTOLIC BLOOD PRESSURE: 73 MMHG | WEIGHT: 145 LBS | HEART RATE: 62 BPM

## 2021-04-22 DIAGNOSIS — C50.411 MALIGNANT NEOPLASM OF UPPER-OUTER QUADRANT OF RIGHT BREAST IN FEMALE, ESTROGEN RECEPTOR NEGATIVE (HCC): Primary | ICD-10-CM

## 2021-04-22 DIAGNOSIS — Z17.1 MALIGNANT NEOPLASM OF UPPER-OUTER QUADRANT OF RIGHT BREAST IN FEMALE, ESTROGEN RECEPTOR NEGATIVE (HCC): Primary | ICD-10-CM

## 2021-04-22 PROCEDURE — 99204 OFFICE O/P NEW MOD 45 MIN: CPT | Performed by: SURGERY

## 2021-04-22 PROCEDURE — 1090F PRES/ABSN URINE INCON ASSESS: CPT | Performed by: SURGERY

## 2021-04-22 NOTE — TELEPHONE ENCOUNTER
Spoke with Denice at Dr Kaylyn Lange office, to schedule visit for consult. Next available appointment is 5/11/21 at 1:00 pm. Will forward  Records to Dr Felicia Carreon to screen for sooner office visit.

## 2021-04-22 NOTE — LETTER
4/22/2021    Patient: nAtonia Morales   YOB: 1945   Date of Visit: 4/22/2021     Tai Petersen MD  [id number] [facility] [address] [phone]  Via Fax: [phone]    Dear Tai Petersen MD,      Thank you for referring Ms. Rowena Schneider to [address] [facility] for evaluation. My notes for this consultation are attached. If you have questions, please do not hesitate to call me. I look forward to following your patient along with you.       Sincerely,    Yoselyn Ovalles MD

## 2021-04-22 NOTE — PATIENT INSTRUCTIONS
Breast Cancer: Care Instructions Your Care Instructions Breast cancer occurs when abnormal cells grow out of control in the breast. These cancer cells can spread within the breast, to nearby lymph nodes and other tissues, and to other parts of the body. Being treated for cancer can weaken your body, and you may feel very tired. Get the rest your body needs so you can feel better. Finding out that you have cancer is scary. You may feel many emotions and may need some help coping. Seek out family, friends, and counselors for support. You also can do things at home to make yourself feel better while you go through treatment. Call the [employer] (5-626.729.6285) or visit its website at [employer] for more information. Follow-up care is a key part of your treatment and safety. Be sure to make and go to all appointments, and call your doctor if you are having problems. It's also a good idea to know your test results and keep a list of the medicines you take. How can you care for yourself at home? · Take your medicines exactly as prescribed. Call your doctor if you think you are having a problem with your medicine. You may get medicine for nausea and vomiting if you have these side effects. · Follow your doctor's instructions to relieve pain. Pain from cancer and surgery can almost always be controlled. Use pain medicine when you first notice pain, before it becomes severe. · Eat healthy food. If you do not feel like eating, try to eat food that has protein and extra calories to keep up your strength and prevent weight loss. Drink liquid meal replacements for extra calories and protein. Try to eat your main meal early. · Get some physical activity every day, but do not get too tired. Keep doing the hobbies you enjoy as your energy allows. · Do not smoke. Smoking can make your cancer worse. If you need help quitting, talk to your doctor about stop-smoking programs and medicines.  These can increase your chances of quitting for good. · Take steps to control your stress and workload. Learn relaxation techniques. ? Share your feelings. Stress and tension affect our emotions. By expressing your feelings to others, you may be able to understand and cope with them. ? Consider joining a support group. Talking about a problem with your spouse, a good friend, or other people with similar problems is a good way to reduce tension and stress. ? Express yourself through art. Try writing, crafts, dance, or art to relieve stress. Some dance, writing, or art groups may be available just for people who have cancer. ? Be kind to your body and mind. Getting enough sleep, eating a healthy diet, and taking time to do things you enjoy can contribute to an overall feeling of balance in your life and can help reduce stress. ? Get help if you need it. Discuss your concerns with your doctor or counselor. · If you are vomiting or have diarrhea: ? Drink plenty of fluids to prevent dehydration. Choose water and other caffeine-free clear liquids. If you have kidney, heart, or liver disease and have to limit fluids, talk with your doctor before you increase the amount of fluids you drink. ? When you are able to eat, try clear soups, mild foods, and liquids until all symptoms are gone for 12 to 48 hours. Other good choices include dry toast, crackers, cooked cereal, and gelatin dessert, such as Jell-O. · If you have not already done so, prepare a list of advance directives. Advance directives are instructions to your doctor and family members about what kind of care you want if you become unable to speak or express yourself. When should you call for help? Call 911 anytime you think you may need emergency care. For example, call if: 
  · You passed out (lost consciousness).   
Call your doctor now or seek immediate medical care if: 
  · You have a fever.  
  · You have abnormal bleeding.  
  · You think you have an infection.  
  · You have new or worse pain.  
  · You have new symptoms, such as a cough, belly pain, vomiting, diarrhea, or a rash. Watch closely for changes in your health, and be sure to contact your doctor if: 
  · You are much more tired than usual.  
  · You have swollen glands in your armpits, groin, or neck.  
  · You do not get better as expected. Where can you learn more? Go to http://www.[employer].com/ Enter V321 in the search box to learn more about \"Breast Cancer: Care Instructions. \" Current as of: December 17, 2020               Content Version: 12.8 © 7988-6676 [employer]. Care instructions adapted under license by [employer] (which disclaims liability or warranty for this information). If you have questions about a medical condition or this instruction, always ask your healthcare professional. [address] any warranty or liability for your use of this information.

## 2021-04-26 ENCOUNTER — TELEPHONE (OUTPATIENT)
Dept: ONCOLOGY | Age: 76
End: 2021-04-26

## 2021-04-26 NOTE — TELEPHONE ENCOUNTER
----- Message from Merlyn Bowers RN sent at 4/26/2021  9:49 AM EDT -----  Regarding: RE: New Patient from Dr. Jorge Hamlin ( wants patient to come in earlier)  Would you mind to put her on tomorrow at 1 PM please and let her know? Thank you!   ----- Message -----  From: Thelma Sit: 4/22/2021   3:05 PM EDT  To: Merlyn Bowers RN  Subject: New Patient from Dr. Jorge Hamlin ( wants #    Dr. Jorge Hamlin office wanting this patient seen sooner than what I have available. She is schedule for May 11th.   Please advises         # [phone] #3

## 2021-04-26 NOTE — TELEPHONE ENCOUNTER
Notified patient of appointment change.   Dr Ty Ramírez on 4/27/21 arrive at 1:00 pm;  [address] [place] [address]. [phone]

## 2021-04-27 ENCOUNTER — NURSE NAVIGATOR (OUTPATIENT)
Dept: CASE MANAGEMENT | Age: 76
End: 2021-04-27

## 2021-04-27 ENCOUNTER — OFFICE VISIT (OUTPATIENT)
Dept: ONCOLOGY | Age: 76
End: 2021-04-27
Payer: MEDICARE

## 2021-04-27 ENCOUNTER — DOCUMENTATION ONLY (OUTPATIENT)
Dept: ONCOLOGY | Age: 76
End: 2021-04-27

## 2021-04-27 VITALS
RESPIRATION RATE: 18 BRPM | HEART RATE: 69 BPM | HEIGHT: 65 IN | BODY MASS INDEX: 25.12 KG/M2 | WEIGHT: 150.8 LBS | TEMPERATURE: 98.7 F | DIASTOLIC BLOOD PRESSURE: 69 MMHG | SYSTOLIC BLOOD PRESSURE: 138 MMHG | OXYGEN SATURATION: 97 %

## 2021-04-27 DIAGNOSIS — C50.411 MALIGNANT NEOPLASM OF UPPER-OUTER QUADRANT OF RIGHT BREAST IN FEMALE, ESTROGEN RECEPTOR NEGATIVE (HCC): Primary | ICD-10-CM

## 2021-04-27 DIAGNOSIS — Z17.1 MALIGNANT NEOPLASM OF UPPER-OUTER QUADRANT OF RIGHT BREAST IN FEMALE, ESTROGEN RECEPTOR NEGATIVE (HCC): Primary | ICD-10-CM

## 2021-04-27 PROCEDURE — G0463 HOSPITAL OUTPT CLINIC VISIT: HCPCS | Performed by: INTERNAL MEDICINE

## 2021-04-27 PROCEDURE — 99205 OFFICE O/P NEW HI 60 MIN: CPT | Performed by: INTERNAL MEDICINE

## 2021-04-27 NOTE — PROGRESS NOTES
Oncology Social Work  Psychosocial Assessment    Reason for Assessment:      [x] Social Work Referral [x] Initial Assessment [x] New Diagnosis [] Other    Advance Care Planning:  No flowsheet data found. Sources of Information:    [x]Patient  [x]Family  [x]Staff  [x]Medical Record    Mental Status:    [x]Alert  []Lethargic  []Unresponsive   [] Unable to assess   Oriented to:  [x]Person  [x]Place  [x]Time  [x]Situation      Relationship Status:  []Single  [x]  []Significant Other/Life Partner  []  []  []    Living Circumstances:  []Lives Alone  [x]Family/Significant Other in Household  []Roommates  []Children in the Home  []Paid Caregivers  []Assisted Living Facility/Group Home  []Skilled [address]  []Homeless  []Incarcerated  []Environmental/Care Concerns  []Other:    Employment Status:  []Employed Full-time []Employed Part-time []Homemaker  [x] Retired [] Short-Term Disability [] [facility]  [] Unemployed     Barriers to Learning:    []Language  []Developmental  []Cognitive  []Altered Mental Status  []Visual/Hearing Impairment  []Unable to Read/Write  []Motivational  [] Challenges Understanding Medical Jargon [x]No Barriers Identified      Financial/Legal Concerns:    []Uninsured  []Limited Income/Resources  []Non-Citizen  []Food Insecurity [x]No Concerns Identified   []Other:    Gnosticism/Spiritual/Existential:  Does patient have any spiritual or Rastafari beliefs? [x] Yes [] No  Is the patient involved in a spiritual, edin or Rastafari community?  [x] Yes [] No  Patient expressing spiritual/existential angst? [] Yes [x] No  Notes:    Support System:    Identified Support Person/Group: Rashmi Simone ()  [x]Strong  []Fair  []Limited    Coping with Illness:   [x]  Coping Well  [] Challenges Coping with Serious Illness [] Situational Depression [] Situational Anxiety [] Anticipatory Grief  [] Recent Loss [] Caregiver [place]            Narrative: Patient here for consultation with Dr. Car Leavitt for the management of breast cancer. Met with patient and her , Daniella Shah, to introduce social work role and supports. Patient and Daniella Shah live in their private residence. They have 2 adult children, 4 grand children and 4 great grandchildren. She is retired. She has insurance and Rx coverage. Patient presents as pleasant and engaged. She denies feelings of sadness or worry related to her diagnosis. She tells me she has always gotten anxious at times but has been able to adequately manage those feelings. She is well supported with practical and emotional needs by family and her [employer] community. Review barriers to care and none identified a this time. Plan: Patient is actively coping with diagnosis and treatment and is well supported. No barriers identified at this time. Provided new patient folder with support services and resources. Encouraged patient to contact me as needed for psychosocial support.      Referral:     Thank you,  Rina Norris LCSW

## 2021-04-27 NOTE — NURSE NAVIGATOR
[address]  Breast Navigator Encounter    Name: Kendall Pryor  Age: 66 y.o.  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 Introduced self/role of Breast Navigator. Pt accompanied by  of 62 years and verbalizes excellent support system. Pt coping well stating she is of the mindset that she is ready to do what needs to be done to take care of it and move on. I acknowledged her viewpoint and encouraged her positivity. Pt and pt's  mention caring for their 2 young grandsons, I believe ages 8 and 15. They declined assistance in telling the children, acknowledging it had been discussed. No further questions or concerns at this time. Contact info reviewed and pt encouraged to reach out as needed. Will follow-up. Referrals/Handouts:  Breast Navigator letter with contact info, local breast cancer support group flyer, [employer] handout, [employer] support letter.      Interdisciplinary Team:  Med-Onc: Dr. Shanna Miguel MD  Surg-Onc: Dr. Carlos Mohamud MD  Rad-Onc:   Plastics:  : Zachary Estrada LCSW  Nurse Navigator:  RICO ChinoN, RN, RICO MerinoN, RN, VIA [facility]  Oncology Breast Navigator    [address]  [address] [place] [address]  W: [phone]  F: [phone]  [email or web address]   Good Help to Those in [facility]

## 2021-04-27 NOTE — PROGRESS NOTES
[facility] [place] at [address] [facility]  [facility]: [phone]  F: [phone]      Reason for Visit:   Koffi Jung is a 66 y.o. female who is seen in consultation at the request of Dr. Yo Vanessa for evaluation of therapy for breast cancer    Treatment History:   · 3/12/21 GEJ bx:  Squamocolumnar mucosa with reactive changes  · 21 right breast 1:00 core bx:  IDC, gr 3, 1.3 cm, gr 3, ER negative, NH negative, HER 2 negative at [facility] 1+    History of Present Illness: An abnormal mammogram led to the pathology above    FH:  Maternal aunt with possible breast cancer at an unknown age; sister with ovarian cancer in her 62s; no prostate or pancreas cancer    Past Medical History:   Diagnosis Date    Allergic rhinitis due to other allergen     Arthritis     GERD (gastroesophageal reflux disease)     Hypercholesterolemia     Psychiatric disorder     anxiety    Seizures ([address]     \"with stress\" last was       Past Surgical History:   Procedure Laterality Date    HX SALPINGO-OOPHORECTOMY Left     at about age 26    HX WRIST FRACTURE TX      right wrist fracture. Social History     Tobacco Use    Smoking status: Never Smoker    Smokeless tobacco: Never Used   Substance Use Topics    Alcohol use: No      Family History   Problem Relation Age of Onset    Diabetes Mother     Cancer Sister     Cancer Brother     Breast Cancer Maternal Aunt      Current Outpatient Medications   Medication Sig    escitalopram oxalate (LEXAPRO) 10 mg tablet Take 10 mg by mouth daily. Indications: anxiousness associated with depression    alendronate (FOSAMAX) 35 mg tablet Take 70 mg by mouth every seven (7) days.  timoloL (BETIMOL) 0.25 % ophthalmic solution Administer 1-2 Drops to both eyes two (2) times a day.  use in affected eye(s)    ergocalciferol (ERGOCALCIFEROL) 50,000 unit capsule Take 1 capsule(s) every week by oral route    latanoprost (XALATAN) 0.005 % ophthalmic solution latanoprost 0.005 % eye drops    diclofenac EC (VOLTAREN) 75 mg EC tablet TAKE ONE TABLET BY MOUTH TWICE DAILY     No current facility-administered medications for this visit. Allergies   Allergen Reactions    Codeine Other (comments)     Unknown    Other reaction(s): Other (see comments)  Unknown        Review of Systems: A complete review of systems was obtained, negative except as described above. Physical Exam:     Visit Vitals  LMP 11/15/1990 (Exact Date)     ECOG PS: 0    Constitutional: Appears well-developed and well-nourished in no apparent distress      Mental status: Alert and awake, Oriented to person/place/time, Able to follow commands    Eyes: EOM normal, Sclera normal, No visible ocular discharge    HENT: Normocephalic, atraumatic    Mouth/Throat: Moist mucous membranes    External Ears normal    Neck: No visualized mass    Pulmonary/Chest: Respiratory effort normal, No visualized signs of difficulty breathing or respiratory distress    Musculoskeletal: Normal gait with no signs of ataxia, Normal range of motion of neck    Neurological: No facial asymmetry (Cranial nerve 7 motor function), No gaze palsy    Skin: No significant exanthematous lesions or discoloration noted on facial skin    Psychiatric: Normal affect, No hallucinations               Results:     Lab Results   Component Value Date/Time    WBC 4.3 07/16/2013 09:23 AM    HGB 13.8 07/16/2013 09:23 AM    HCT 42.4 07/16/2013 09:23 AM    PLATELET 392 54/94/1290 09:23 AM    MCV 92 07/16/2013 09:23 AM    ABS.  NEUTROPHILS 2.3 08/27/2009 08:44 AM     Lab Results   Component Value Date/Time    Sodium 140 07/16/2013 09:23 AM    Potassium 4.3 07/16/2013 09:23 AM    Chloride 99 07/16/2013 09:23 AM    CO2 28 07/16/2013 09:23 AM    Glucose 74 07/16/2013 09:23 AM    BUN 15 07/16/2013 09:23 AM    Creatinine 0.66 07/16/2013 09:23 AM    GFR est AA >59 11/15/2010 09:40 AM    GFR est non-AA 90 07/16/2013 09:23 AM    Calcium 9.4 07/16/2013 09:23 AM     Lab Results   Component Value Date/Time    Bilirubin, total 0.6 07/16/2013 09:23 AM    ALT (SGPT) 21 07/16/2013 09:23 AM    Alk. phosphatase 94 07/16/2013 09:23 AM    Protein, total 6.7 07/16/2013 09:23 AM    Albumin 4.4 07/16/2013 09:23 AM    Globulin 2.6 08/27/2009 08:44 AM     3/30/21 dexa  Findings:     Fractures identified on Lateral scanogram:  None     Femoral Neck Left:  Bone mineral density (gm/cm2):  0.698 g/cm?  % of peak bone mass:  67%  % for age matched controls:  92%  T-score:  -2.4   Z-score:  -0.4      Femoral Neck Right:  Bone mineral density (gm/cm2):  0.710 g/cm?  % of peak bone mass:  68%  % for age matched controls:  94%  T-score:  -2.4   Z-score:  -0.3      Total Hip Left:  Bone mineral density (gm/cm2):  0.774 g/cm?  % of peak bone mass:  77%  % for age matched controls:  100%  T-score:  -1.9   Z-score:  0.0      Total Hip Right:  Bone mineral density (gm/cm2):  0.736 g/cm?  % of peak bone mass:  73%  % for age matched controls:  95%  T-score:  -2.2   Z-score:  -0.3      33% Radius Left:  Bone mineral density (gm/cm2):  0.446 g/cm?  % of peak bone mass:  63%  % for age matched controls:  63%  T-score:  -3.7   Z-score:  -1.2      IMPRESSION     This patient is osteoporotic using the World Health Organization criteria  As compared to the prior study, there has been a significant 5.6% increase in  the left total hip and a significant 9% increase in the right total hip    4/12/21 right breast US  IMPRESSION should read \"BI-RADS 4. Suspicious abnormality. Recommend histologic  correlation. 1.8 cm x 1.6 cm x 1.2 cm 10:00 right breast lesion. Records reviewed and summarized above. Pathology report(s) reviewed above. Radiology report(s) reviewed above. Assessment/plan:   1.  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 We explained why a patient can have microscopic cancer spread now even though physical examination, laboratory studies and imaging studies are negative for cancer. We explained that the same treatments used now as adjuvant or preventive treatments rarely if ever are curative in women who develop metastases. Using eprognosis. org, her 5 year all cause mortality risk is 9-15%; her 10 year all cause mortality risk is 34-43%; her median OS is 12-14 years. An adjuvant discussion is warranted. No falls in past 6 months    She will proceed with surgery. We discussed adjuvant chemotherapy following surgery. We discussed the toxicities of TC chemotherapy (docetaxel 75mg/m2/cyclophosphamide 600 mg/m2 q 3 weeks x 4)  in detail. This chemotherapy frequently causes a low white blood cell count and a small percent of patients require hospital admission for treatment of neutropenic fever. We explained that on occasion we consider the use of growth factors to minimize this risk. We explained to the patient that some side effects, if they occur, only last a few days including nausea, vomiting, stomatitis, arthralgia, myalgia, and allergic reactions to Taxotere. We told the patient that severe nausea and vomiting were very uncommon and that some side effects, if they occur, will last longer: this includes hair loss, which will be seen in all patients treated with these agents and fatigue, which will be seen in most. The patient was also told that if she is having menstrual function that she could develop chemotherapy-induced amenorrhea and infertility. We also told the patient that there was a very small risk of acute leukemia.  We also informed the patient that heart damage is rare with these agents    Discussed IV CMF q 3 weeks x 6.  CMF (methotrexate 40 mg/m2, cyclophosphamide 600 mg/m2, fluorouracil 600 mg/m2).  Side effects discussed including, but not limited to, fatigue, myelosuppression, diarrhea, nausea, vomiting, mouth sores, and possible alopecia, and a possible, but very rare, severe allergic reaction to 5-FU.     I do not feel the efficacy difference between TC and CMF for her is large, in the low single digits. Discussed oral and peripheral cryotherapy (for TC)     Discussed increased risk of death with COVID19 and precautions to take with chemotherapy, I strongly recommend that she get the COVID19 vaccine.     Discussed cold caps    She will RTC following surgery to finalize her therapy plan. At this time, she is leaning towards chemo, likely CMF. She would then plan on having a port placed at the time of surgery and will discuss with Dr. Rasta Curtis    2. Emotional well being:  She has excellent support and is coping well with her disease    3. Osteoporosis:  On fosamax and vit D 50,000 units weekly    > 60 min were spent in this encounter with 48 min spent in face to face consultation and the rest in record review and documentation    I appreciate the opportunity to participate in Ms. Isabela Bhagat's care. Signed By: Krystle Chamberlain MD      No orders of the defined types were placed in this encounter.

## 2021-04-27 NOTE — PROGRESS NOTES
4/17/21 2 PM: Provided patient with a chemotherapy education packet including a handout on breast cancer printed from cancer. net, handouts on TC and CMF chemotherapy regimens, a handout on common side effects of chemotherapy, and a handout on how to safely handle bodily secretions and waste after chemotherapy. Patient will review handouts at home and will discuss treatment plan further during her post-surgery appointment in five weeks.

## 2021-04-28 ENCOUNTER — TELEPHONE (OUTPATIENT)
Dept: SURGERY | Age: 76
End: 2021-04-28

## 2021-04-28 DIAGNOSIS — C50.911 MALIGNANT NEOPLASM OF RIGHT FEMALE BREAST, UNSPECIFIED ESTROGEN RECEPTOR STATUS, UNSPECIFIED SITE OF BREAST (HCC): Primary | ICD-10-CM

## 2021-04-28 NOTE — PROGRESS NOTES
Placed call to schedule Covid testing. Patient states that she will need to reschedule surgery for another day. Patient advised to contact Dr. Kip Caal' office. Patient verbalized an understanding.

## 2021-04-29 NOTE — TELEPHONE ENCOUNTER
Surgery instructions verbally given for 5-12-21 at [facility] [address] also sent to my chart.

## 2021-05-04 NOTE — PERIOP NOTES
[address] [facility], [address] [facility]       PRE-ADMISSION TESTING    [phone]     Surgery Date: Wednesday 5/12/21       Is surgery arrival time given by surgeon? NO  If NO, [facility] staff will call you between 3 and 7pm the day before your surgery with your arrival time. (If your surgery is on a Monday, we will call you the Friday before.)    Call [phone] after 7pm Monday-Friday if you did not receive this call. INSTRUCTIONS BEFORE YOUR SURGERY   When You  Arrive Arrive at the [address] [facility] on the day of your surgery  Have your insurance card, photo ID, and any copayment (if needed)   Food   and   Drink NO food or drink after midnight the night before surgery    This means NO water, gum, mints, coffee, juice, etc.  No alcohol (beer, wine, liquor) 24 hours before and after surgery   Medications to   TAKE   Morning of Surgery MEDICATIONS TO TAKE THE MORNING OF SURGERY WITH A SIP OF WATER:    escitalopram   Eye drops   Medications  To  STOP      7 days before surgery  Non-Steroidal anti-inflammatory Drugs (NSAID's): for example, Ibuprofen (Advil, Motrin), Naproxen (Aleve)   Aspirin, if taking for pain    Herbal supplements, vitamins, and fish oil   Other:stop diclofenac 7 days prior to surgery  (Pain medications not listed above, including Tylenol may be taken)   Blood  Thinners  If you take  Aspirin, Plavix, Coumadin, or any blood-thinning or anti-blood clot medicine, talk to the doctor who prescribed the medications for pre-operative instructions. Bathing Clothing  Jewelry  Valuables      If you shower the morning of surgery, please do not apply anything to your skin (lotions, powders, deodorant, or makeup, especially mascara)   Follow Chlorhexidine Care Fusion body wash instructions provided to you during PAT appointment. Begin 3 days prior to surgery.    Do not shave or trim anywhere 24 hours before surgery   Wear your hair loose or down; no pony-tails, buns, or metal hair clips   Wear loose, comfortable, clean clothes   Wear glasses instead of contacts   Leave money, valuables, and jewelry, including body piercings, at home   Going Home - or Spending the Night  SAME-DAY SURGERY: You must have a responsible adult drive you home and stay with you 24 hours after surgery   ADMITS: If your doctor is keeping you in the hospital after surgery, leave personal belongings/luggage in your car until you have a hospital room number. Hospital discharge time is 12 noon  Drivers must be here before 12 noon unless you are told differently   Special Instructions It is now mandated that all surgical patients be tested for COVID-19 prior to surgery. Testing has to be exactly 4 days prior to surgery. Your COVID test date is Saturday 5/8/21 between 8:00 am and 11:00 am.       COVID testing will be performed curbside at the [address] [place] [address] [facility]arly. There will be signs leading you to the testing site. You will need to bring a photo ID with you to be swabbed. Patients are advised to self-quarantine at home after testing and prior to your surgery date. You will be notified if your results are positive. What to watch for:   Coronavirus (COVID-19) affects different people in different ways   It also appears with a wide range of symptoms from mild to severe   Signs usually appear 2-14 days after exposure     If you develop any of the following, notify your doctor immediately:  o Fever  o Chills, with or without a shiver  o Muscle pain  o Headache  o Sore throat  o Dry cough  o New loss of taste or smell  o Tiredness      If you develop any of the following, call 911:  o Shortness of breath  o Difficulty breathing  o Chest pain  o New confusion  Blueness of fingers and/or lips     Follow all instructions so your surgery wont be cancelled. Please, be on time.                     If a situation occurs and you are delayed the day of surgery, call [phone] or [phone]. If your physical condition changes (like a fever, cold, flu, etc.) call your surgeon. Home medication(s) reviewed and verified via      VERBAL   during PAT appointment. The patient was contacted by  PHONE    The patient verbalizes understanding of all instructions and     DOES NOT   need reinforcement.

## 2021-05-07 ENCOUNTER — TRANSCRIBE ORDER (OUTPATIENT)
Dept: REGISTRATION | Age: 76
End: 2021-05-07

## 2021-05-07 DIAGNOSIS — Z01.812 ENCOUNTER FOR PREOPERATIVE SCREENING LABORATORY TESTING FOR COVID-19 VIRUS: Primary | ICD-10-CM

## 2021-05-07 DIAGNOSIS — Z20.822 ENCOUNTER FOR PREOPERATIVE SCREENING LABORATORY TESTING FOR COVID-19 VIRUS: Primary | ICD-10-CM

## 2021-05-08 ENCOUNTER — HOSPITAL ENCOUNTER (OUTPATIENT)
Dept: PREADMISSION TESTING | Age: 76
Discharge: HOME | End: 2021-05-08
Payer: MEDICARE

## 2021-05-08 PROCEDURE — U0003 INFECTIOUS AGENT DETECTION BY NUCLEIC ACID (DNA OR RNA); SEVERE ACUTE RESPIRATORY SYNDROME CORONAVIRUS 2 (SARS-COV-2) (CORONAVIRUS DISEASE [COVID-19]), AMPLIFIED PROBE TECHNIQUE, MAKING USE OF HIGH THROUGHPUT TECHNOLOGIES AS DESCRIBED BY CMS-2020-01-R: HCPCS

## 2021-05-09 LAB — SARS-COV-2, COV2NT: NOT DETECTED

## 2021-05-11 ENCOUNTER — ANESTHESIA EVENT (OUTPATIENT)
Dept: SURGERY | Age: 76
End: 2021-05-11
Payer: MEDICARE

## 2021-05-12 ENCOUNTER — APPOINTMENT (OUTPATIENT)
Dept: NON INVASIVE DIAGNOSTICS | Age: 76
End: 2021-05-12
Attending: NURSE PRACTITIONER
Payer: MEDICARE

## 2021-05-12 ENCOUNTER — APPOINTMENT (OUTPATIENT)
Dept: GENERAL RADIOLOGY | Age: 76
End: 2021-05-12
Attending: SURGERY
Payer: MEDICARE

## 2021-05-12 ENCOUNTER — HOSPITAL ENCOUNTER (OUTPATIENT)
Age: 76
Setting detail: OUTPATIENT SURGERY
Discharge: HOME | End: 2021-05-12
Attending: SURGERY | Admitting: SURGERY
Payer: MEDICARE

## 2021-05-12 ENCOUNTER — APPOINTMENT (OUTPATIENT)
Dept: NON INVASIVE DIAGNOSTICS | Age: 76
End: 2021-05-12
Attending: INTERNAL MEDICINE
Payer: MEDICARE

## 2021-05-12 ENCOUNTER — ANESTHESIA (OUTPATIENT)
Dept: SURGERY | Age: 76
End: 2021-05-12
Payer: MEDICARE

## 2021-05-12 VITALS
TEMPERATURE: 97.5 F | HEART RATE: 74 BPM | HEIGHT: 65 IN | OXYGEN SATURATION: 95 % | DIASTOLIC BLOOD PRESSURE: 69 MMHG | SYSTOLIC BLOOD PRESSURE: 129 MMHG | BODY MASS INDEX: 24.99 KG/M2 | WEIGHT: 150 LBS | RESPIRATION RATE: 23 BRPM

## 2021-05-12 DIAGNOSIS — I49.8 ARRHYTHMIA, AV NODE: ICD-10-CM

## 2021-05-12 DIAGNOSIS — C50.911 MALIGNANT NEOPLASM OF RIGHT FEMALE BREAST, UNSPECIFIED ESTROGEN RECEPTOR STATUS, UNSPECIFIED SITE OF BREAST (HCC): ICD-10-CM

## 2021-05-12 DIAGNOSIS — I45.5 SINUS PAUSE: ICD-10-CM

## 2021-05-12 LAB
ALBUMIN SERPL-MCNC: 3.2 G/DL (ref 3.5–5)
ALBUMIN/GLOB SERPL: 1.2 {RATIO} (ref 1.1–2.2)
ALP SERPL-CCNC: 85 U/L (ref 45–117)
ALT SERPL-CCNC: 25 U/L (ref 12–78)
ANION GAP SERPL CALC-SCNC: 5 MMOL/L (ref 5–15)
AST SERPL-CCNC: 24 U/L (ref 15–37)
BASOPHILS # BLD: 0 K/UL (ref 0–0.1)
BASOPHILS NFR BLD: 0 % (ref 0–1)
BILIRUB SERPL-MCNC: 0.5 MG/DL (ref 0.2–1)
BUN SERPL-MCNC: 15 MG/DL (ref 6–20)
BUN/CREAT SERPL: 22 (ref 12–20)
CALCIUM SERPL-MCNC: 8.4 MG/DL (ref 8.5–10.1)
CHLORIDE SERPL-SCNC: 104 MMOL/L (ref 97–108)
CO2 SERPL-SCNC: 32 MMOL/L (ref 21–32)
COMMENT, HOLDF: NORMAL
COMMENT, HOLDF: NORMAL
CREAT SERPL-MCNC: 0.67 MG/DL (ref 0.55–1.02)
DIFFERENTIAL METHOD BLD: ABNORMAL
ECHO AO ROOT DIAM: 3.06 CM
ECHO AV AREA PEAK VELOCITY: 1.95 CM2
ECHO AV AREA/BSA PEAK VELOCITY: 1.1 CM2/M2
ECHO AV PEAK GRADIENT: 8.7 MMHG
ECHO AV PEAK VELOCITY: 147.5 CM/S
ECHO EST RA PRESSURE: 8 MMHG
ECHO LA AREA 4C: 16.41 CM2
ECHO LA MAJOR AXIS: 2.86 CM
ECHO LA MINOR AXIS: 1.63 CM
ECHO LA VOL 2C: 31.25 ML (ref 22–52)
ECHO LA VOL 4C: 36.78 ML (ref 22–52)
ECHO LA VOL BP: 40.31 ML (ref 22–52)
ECHO LA VOL/BSA BIPLANE: 23.03 ML/M2 (ref 16–28)
ECHO LA VOLUME INDEX A2C: 17.85 ML/M2 (ref 16–28)
ECHO LA VOLUME INDEX A4C: 21.01 ML/M2 (ref 16–28)
ECHO LV E' LATERAL VELOCITY: 9.14 CENTIMETER/SECOND
ECHO LV E' SEPTAL VELOCITY: 10.83 CENTIMETER/SECOND
ECHO LV INTERNAL DIMENSION DIASTOLIC: 4.44 CM (ref 3.9–5.3)
ECHO LV INTERNAL DIMENSION SYSTOLIC: 2.73 CM
ECHO LV IVSD: 0.86 CM (ref 0.6–0.9)
ECHO LV MASS 2D: 112.9 G (ref 67–162)
ECHO LV MASS INDEX 2D: 64.5 G/M2 (ref 43–95)
ECHO LV POSTERIOR WALL DIASTOLIC: 0.76 CM (ref 0.6–0.9)
ECHO LVOT DIAM: 2.1 CM
ECHO LVOT PEAK GRADIENT: 2.78 MMHG
ECHO LVOT PEAK VELOCITY: 83.31 CM/S
ECHO MV A VELOCITY: 71.39 CENTIMETER/SECOND
ECHO MV E DECELERATION TIME (DT): 137.21 MS
ECHO MV E VELOCITY: 65.76 CENTIMETER/SECOND
ECHO PV PEAK INSTANTANEOUS GRADIENT SYSTOLIC: 1.18 MMHG
ECHO PV REGURGITANT MAX VELOCITY: 54.3 CM/S
ECHO RIGHT VENTRICULAR SYSTOLIC PRESSURE (RVSP): 32.88 MMHG
ECHO RV INTERNAL DIMENSION: 3.24 CM
ECHO RV TAPSE: 1.81 CM (ref 1.5–2)
ECHO TV REGURGITANT MAX VELOCITY: 249.42 CM/S
ECHO TV REGURGITANT PEAK GRADIENT: 24.88 MMHG
EOSINOPHIL # BLD: 0.2 K/UL (ref 0–0.4)
EOSINOPHIL NFR BLD: 3 % (ref 0–7)
ERYTHROCYTE [DISTWIDTH] IN BLOOD BY AUTOMATED COUNT: 14.1 % (ref 11.5–14.5)
GLOBULIN SER CALC-MCNC: 2.6 G/DL (ref 2–4)
GLUCOSE SERPL-MCNC: 96 MG/DL (ref 65–100)
HCT VFR BLD AUTO: 36 % (ref 35–47)
HGB BLD-MCNC: 11.4 G/DL (ref 11.5–16)
IMM GRANULOCYTES # BLD AUTO: 0 K/UL (ref 0–0.04)
IMM GRANULOCYTES NFR BLD AUTO: 0 % (ref 0–0.5)
LA VOL DISK BP: 36.51 ML (ref 22–52)
LYMPHOCYTES # BLD: 1 K/UL (ref 0.8–3.5)
LYMPHOCYTES NFR BLD: 21 % (ref 12–49)
MAGNESIUM SERPL-MCNC: 2 MG/DL (ref 1.6–2.4)
MCH RBC QN AUTO: 30.6 PG (ref 26–34)
MCHC RBC AUTO-ENTMCNC: 31.7 G/DL (ref 30–36.5)
MCV RBC AUTO: 96.5 FL (ref 80–99)
MONOCYTES # BLD: 0.4 K/UL (ref 0–1)
MONOCYTES NFR BLD: 8 % (ref 5–13)
NEUTS SEG # BLD: 3.3 K/UL (ref 1.8–8)
NEUTS SEG NFR BLD: 68 % (ref 32–75)
NRBC # BLD: 0 K/UL (ref 0–0.01)
NRBC BLD-RTO: 0 PER 100 WBC
PHOSPHATE SERPL-MCNC: 3.6 MG/DL (ref 2.6–4.7)
PLATELET # BLD AUTO: 224 K/UL (ref 150–400)
PMV BLD AUTO: 10.1 FL (ref 8.9–12.9)
POTASSIUM SERPL-SCNC: 4.6 MMOL/L (ref 3.5–5.1)
PROT SERPL-MCNC: 5.8 G/DL (ref 6.4–8.2)
RBC # BLD AUTO: 3.73 M/UL (ref 3.8–5.2)
SAMPLES BEING HELD,HOLD: NORMAL
SAMPLES BEING HELD,HOLD: NORMAL
SODIUM SERPL-SCNC: 141 MMOL/L (ref 136–145)
TROPONIN I SERPL-MCNC: <0.05 NG/ML
TROPONIN I SERPL-MCNC: <0.05 NG/ML
TSH SERPL DL<=0.05 MIU/L-ACNC: 5.62 UIU/ML (ref 0.36–3.74)
WBC # BLD AUTO: 5 K/UL (ref 3.6–11)

## 2021-05-12 PROCEDURE — 36561 INSERT TUNNELED CV CATH: CPT | Performed by: SURGERY

## 2021-05-12 PROCEDURE — 74011000272 HC RX REV CODE- 272: Performed by: SURGERY

## 2021-05-12 PROCEDURE — 76210000032 HC AMBSU PH I REC 3 TO 3.5 HR: Performed by: SURGERY

## 2021-05-12 PROCEDURE — 36415 COLL VENOUS BLD VENIPUNCTURE: CPT

## 2021-05-12 PROCEDURE — 74011250636 HC RX REV CODE- 250/636: Performed by: ANESTHESIOLOGY

## 2021-05-12 PROCEDURE — 74011000636 HC RX REV CODE- 636: Performed by: SURGERY

## 2021-05-12 PROCEDURE — 38900 IO MAP OF SENT LYMPH NODE: CPT | Performed by: SURGERY

## 2021-05-12 PROCEDURE — 93306 TTE W/DOPPLER COMPLETE: CPT

## 2021-05-12 PROCEDURE — 93306 TTE W/DOPPLER COMPLETE: CPT | Performed by: INTERNAL MEDICINE

## 2021-05-12 PROCEDURE — 77030031139 HC SUT VCRL2 J&J -A: Performed by: SURGERY

## 2021-05-12 PROCEDURE — 74011250636 HC RX REV CODE- 250/636: Performed by: NURSE ANESTHETIST, CERTIFIED REGISTERED

## 2021-05-12 PROCEDURE — 84484 ASSAY OF TROPONIN QUANT: CPT

## 2021-05-12 PROCEDURE — 77030010507 HC ADH SKN DERMBND J&J -B: Performed by: SURGERY

## 2021-05-12 PROCEDURE — 2709999900 HC NON-CHARGEABLE SUPPLY: Performed by: SURGERY

## 2021-05-12 PROCEDURE — 71045 X-RAY EXAM CHEST 1 VIEW: CPT

## 2021-05-12 PROCEDURE — 38525 BIOPSY/REMOVAL LYMPH NODES: CPT | Performed by: SURGERY

## 2021-05-12 PROCEDURE — 88341 IMHCHEM/IMCYTCHM EA ADD ANTB: CPT

## 2021-05-12 PROCEDURE — 99220 PR INITIAL OBSERVATION CARE/DAY 70 MINUTES: CPT | Performed by: INTERNAL MEDICINE

## 2021-05-12 PROCEDURE — 93271 ECG/MONITORING AND ANALYSIS: CPT

## 2021-05-12 PROCEDURE — 84100 ASSAY OF PHOSPHORUS: CPT

## 2021-05-12 PROCEDURE — 76060000062 HC AMB SURG ANES 1 TO 1.5 HR: Performed by: SURGERY

## 2021-05-12 PROCEDURE — 83735 ASSAY OF MAGNESIUM: CPT

## 2021-05-12 PROCEDURE — 88360 TUMOR IMMUNOHISTOCHEM/MANUAL: CPT

## 2021-05-12 PROCEDURE — 74011000250 HC RX REV CODE- 250: Performed by: NURSE ANESTHETIST, CERTIFIED REGISTERED

## 2021-05-12 PROCEDURE — C1788 PORT, INDWELLING, IMP: HCPCS | Performed by: SURGERY

## 2021-05-12 PROCEDURE — 93005 ELECTROCARDIOGRAM TRACING: CPT

## 2021-05-12 PROCEDURE — 77030040922 HC BLNKT HYPOTHRM STRY -A

## 2021-05-12 PROCEDURE — 77030040361 HC SLV COMPR DVT MDII -B

## 2021-05-12 PROCEDURE — 85025 COMPLETE CBC W/AUTO DIFF WBC: CPT

## 2021-05-12 PROCEDURE — 76937 US GUIDE VASCULAR ACCESS: CPT | Performed by: SURGERY

## 2021-05-12 PROCEDURE — 77030002933 HC SUT MCRYL J&J -A: Performed by: SURGERY

## 2021-05-12 PROCEDURE — 84443 ASSAY THYROID STIM HORMONE: CPT

## 2021-05-12 PROCEDURE — 19301 PARTIAL MASTECTOMY: CPT | Performed by: SURGERY

## 2021-05-12 PROCEDURE — 74011250636 HC RX REV CODE- 250/636: Performed by: SURGERY

## 2021-05-12 PROCEDURE — 76210000057 HC AMBSU PH II REC 1 TO 1.5 HR: Performed by: SURGERY

## 2021-05-12 PROCEDURE — 76030000001 HC AMB SURG OR TIME 1 TO 1.5: Performed by: SURGERY

## 2021-05-12 PROCEDURE — 88342 IMHCHEM/IMCYTCHM 1ST ANTB: CPT

## 2021-05-12 PROCEDURE — 77030002996 HC SUT SLK J&J -A: Performed by: SURGERY

## 2021-05-12 PROCEDURE — 88307 TISSUE EXAM BY PATHOLOGIST: CPT

## 2021-05-12 PROCEDURE — 80053 COMPREHEN METABOLIC PANEL: CPT

## 2021-05-12 PROCEDURE — 74011000250 HC RX REV CODE- 250: Performed by: SURGERY

## 2021-05-12 PROCEDURE — 77001 FLUOROGUIDE FOR VEIN DEVICE: CPT | Performed by: SURGERY

## 2021-05-12 DEVICE — POWERPORT CLEARVUE IMPLANTABLE PORT WITH ATTACHABLE 6F POLYURETHANE OPEN-ENDED SINGLE-LUMEN VENOUS CATHETER INTERMEDIATE KIT
Type: IMPLANTABLE DEVICE | Site: CHEST  WALL | Status: NON-FUNCTIONAL
Brand: POWERPORT CLEARVUE
Removed: 2021-12-20

## 2021-05-12 RX ORDER — FLUMAZENIL 0.1 MG/ML
0.2 INJECTION INTRAVENOUS
Status: DISCONTINUED | OUTPATIENT
Start: 2021-05-12 | End: 2021-05-12 | Stop reason: HOSPADM

## 2021-05-12 RX ORDER — LIDOCAINE HYDROCHLORIDE 20 MG/ML
INJECTION, SOLUTION INFILTRATION; PERINEURAL AS NEEDED
Status: DISCONTINUED | OUTPATIENT
Start: 2021-05-12 | End: 2021-05-12 | Stop reason: HOSPADM

## 2021-05-12 RX ORDER — MIDAZOLAM HYDROCHLORIDE 1 MG/ML
INJECTION, SOLUTION INTRAMUSCULAR; INTRAVENOUS AS NEEDED
Status: DISCONTINUED | OUTPATIENT
Start: 2021-05-12 | End: 2021-05-12 | Stop reason: HOSPADM

## 2021-05-12 RX ORDER — PROPOFOL 10 MG/ML
INJECTION, EMULSION INTRAVENOUS
Status: DISCONTINUED | OUTPATIENT
Start: 2021-05-12 | End: 2021-05-12 | Stop reason: HOSPADM

## 2021-05-12 RX ORDER — HYDROMORPHONE HYDROCHLORIDE 1 MG/ML
.5-1 INJECTION, SOLUTION INTRAMUSCULAR; INTRAVENOUS; SUBCUTANEOUS
Status: DISCONTINUED | OUTPATIENT
Start: 2021-05-12 | End: 2021-05-12 | Stop reason: HOSPADM

## 2021-05-12 RX ORDER — GLYCOPYRROLATE 0.2 MG/ML
INJECTION INTRAMUSCULAR; INTRAVENOUS AS NEEDED
Status: DISCONTINUED | OUTPATIENT
Start: 2021-05-12 | End: 2021-05-12 | Stop reason: HOSPADM

## 2021-05-12 RX ORDER — LIDOCAINE HYDROCHLORIDE 10 MG/ML
0.1 INJECTION, SOLUTION EPIDURAL; INFILTRATION; INTRACAUDAL; PERINEURAL AS NEEDED
Status: DISCONTINUED | OUTPATIENT
Start: 2021-05-12 | End: 2021-05-12 | Stop reason: HOSPADM

## 2021-05-12 RX ORDER — BUPIVACAINE HYDROCHLORIDE AND EPINEPHRINE 5; 5 MG/ML; UG/ML
30 INJECTION, SOLUTION EPIDURAL; INTRACAUDAL; PERINEURAL
Status: COMPLETED | OUTPATIENT
Start: 2021-05-12 | End: 2021-05-12

## 2021-05-12 RX ORDER — FENTANYL CITRATE 50 UG/ML
INJECTION, SOLUTION INTRAMUSCULAR; INTRAVENOUS AS NEEDED
Status: DISCONTINUED | OUTPATIENT
Start: 2021-05-12 | End: 2021-05-12

## 2021-05-12 RX ORDER — DEXAMETHASONE SODIUM PHOSPHATE 4 MG/ML
INJECTION, SOLUTION INTRA-ARTICULAR; INTRALESIONAL; INTRAMUSCULAR; INTRAVENOUS; SOFT TISSUE AS NEEDED
Status: DISCONTINUED | OUTPATIENT
Start: 2021-05-12 | End: 2021-05-12 | Stop reason: HOSPADM

## 2021-05-12 RX ORDER — HEPARIN SODIUM (PORCINE) LOCK FLUSH IV SOLN 100 UNIT/ML 100 UNIT/ML
SOLUTION INTRAVENOUS AS NEEDED
Status: DISCONTINUED | OUTPATIENT
Start: 2021-05-12 | End: 2021-05-12 | Stop reason: HOSPADM

## 2021-05-12 RX ORDER — NALOXONE HYDROCHLORIDE 0.4 MG/ML
0.2 INJECTION, SOLUTION INTRAMUSCULAR; INTRAVENOUS; SUBCUTANEOUS
Status: DISCONTINUED | OUTPATIENT
Start: 2021-05-12 | End: 2021-05-12 | Stop reason: HOSPADM

## 2021-05-12 RX ORDER — HEPARIN 100 UNIT/ML
500 SYRINGE INTRAVENOUS AS NEEDED
Status: DISCONTINUED | OUTPATIENT
Start: 2021-05-12 | End: 2021-05-12 | Stop reason: HOSPADM

## 2021-05-12 RX ORDER — FENTANYL CITRATE 50 UG/ML
INJECTION, SOLUTION INTRAMUSCULAR; INTRAVENOUS AS NEEDED
Status: DISCONTINUED | OUTPATIENT
Start: 2021-05-12 | End: 2021-05-12 | Stop reason: HOSPADM

## 2021-05-12 RX ORDER — SODIUM CHLORIDE 0.9 % (FLUSH) 0.9 %
5-40 SYRINGE (ML) INJECTION AS NEEDED
Status: DISCONTINUED | OUTPATIENT
Start: 2021-05-12 | End: 2021-05-12 | Stop reason: HOSPADM

## 2021-05-12 RX ORDER — ONDANSETRON 2 MG/ML
4 INJECTION INTRAMUSCULAR; INTRAVENOUS AS NEEDED
Status: DISCONTINUED | OUTPATIENT
Start: 2021-05-12 | End: 2021-05-12 | Stop reason: HOSPADM

## 2021-05-12 RX ORDER — SODIUM CHLORIDE, SODIUM LACTATE, POTASSIUM CHLORIDE, CALCIUM CHLORIDE 600; 310; 30; 20 MG/100ML; MG/100ML; MG/100ML; MG/100ML
100 INJECTION, SOLUTION INTRAVENOUS CONTINUOUS
Status: DISCONTINUED | OUTPATIENT
Start: 2021-05-12 | End: 2021-05-12 | Stop reason: HOSPADM

## 2021-05-12 RX ORDER — SODIUM CHLORIDE 0.9 % (FLUSH) 0.9 %
5-40 SYRINGE (ML) INJECTION EVERY 8 HOURS
Status: DISCONTINUED | OUTPATIENT
Start: 2021-05-12 | End: 2021-05-12 | Stop reason: HOSPADM

## 2021-05-12 RX ORDER — SODIUM CHLORIDE, SODIUM LACTATE, POTASSIUM CHLORIDE, CALCIUM CHLORIDE 600; 310; 30; 20 MG/100ML; MG/100ML; MG/100ML; MG/100ML
125 INJECTION, SOLUTION INTRAVENOUS CONTINUOUS
Status: DISCONTINUED | OUTPATIENT
Start: 2021-05-12 | End: 2021-05-12 | Stop reason: HOSPADM

## 2021-05-12 RX ORDER — PROPOFOL 10 MG/ML
INJECTION, EMULSION INTRAVENOUS AS NEEDED
Status: DISCONTINUED | OUTPATIENT
Start: 2021-05-12 | End: 2021-05-12 | Stop reason: HOSPADM

## 2021-05-12 RX ORDER — EPHEDRINE SULFATE/0.9% NACL/PF 50 MG/5 ML
SYRINGE (ML) INTRAVENOUS AS NEEDED
Status: DISCONTINUED | OUTPATIENT
Start: 2021-05-12 | End: 2021-05-12 | Stop reason: HOSPADM

## 2021-05-12 RX ORDER — ONDANSETRON 2 MG/ML
INJECTION INTRAMUSCULAR; INTRAVENOUS AS NEEDED
Status: DISCONTINUED | OUTPATIENT
Start: 2021-05-12 | End: 2021-05-12 | Stop reason: HOSPADM

## 2021-05-12 RX ADMIN — FENTANYL CITRATE 12.5 MCG: 0.05 INJECTION, SOLUTION INTRAMUSCULAR; INTRAVENOUS at 09:05

## 2021-05-12 RX ADMIN — MIDAZOLAM HYDROCHLORIDE 0.5 MG: 2 INJECTION, SOLUTION INTRAMUSCULAR; INTRAVENOUS at 07:26

## 2021-05-12 RX ADMIN — PROPOFOL 50 MCG/KG/MIN: 10 INJECTION, EMULSION INTRAVENOUS at 07:33

## 2021-05-12 RX ADMIN — GLYCOPYRROLATE 0.2 MG: 0.2 INJECTION INTRAMUSCULAR; INTRAVENOUS at 08:25

## 2021-05-12 RX ADMIN — PROPOFOL 40 MG: 10 INJECTION, EMULSION INTRAVENOUS at 07:33

## 2021-05-12 RX ADMIN — PROPOFOL 20 MG: 10 INJECTION, EMULSION INTRAVENOUS at 08:05

## 2021-05-12 RX ADMIN — LIDOCAINE HYDROCHLORIDE 60 MG: 20 INJECTION, SOLUTION INFILTRATION; PERINEURAL at 07:33

## 2021-05-12 RX ADMIN — PROPOFOL 20 MG: 10 INJECTION, EMULSION INTRAVENOUS at 07:57

## 2021-05-12 RX ADMIN — FENTANYL CITRATE 25 MCG: 0.05 INJECTION, SOLUTION INTRAMUSCULAR; INTRAVENOUS at 08:15

## 2021-05-12 RX ADMIN — SODIUM CHLORIDE, POTASSIUM CHLORIDE, SODIUM LACTATE AND CALCIUM CHLORIDE 100 ML/HR: 600; 310; 30; 20 INJECTION, SOLUTION INTRAVENOUS at 06:48

## 2021-05-12 RX ADMIN — PROPOFOL 10 MG: 10 INJECTION, EMULSION INTRAVENOUS at 07:55

## 2021-05-12 RX ADMIN — SODIUM CHLORIDE, POTASSIUM CHLORIDE, SODIUM LACTATE AND CALCIUM CHLORIDE: 600; 310; 30; 20 INJECTION, SOLUTION INTRAVENOUS at 07:28

## 2021-05-12 RX ADMIN — MIDAZOLAM HYDROCHLORIDE 0.5 MG: 2 INJECTION, SOLUTION INTRAMUSCULAR; INTRAVENOUS at 08:13

## 2021-05-12 RX ADMIN — DEXAMETHASONE SODIUM PHOSPHATE 4 MG: 4 INJECTION, SOLUTION INTRAMUSCULAR; INTRAVENOUS at 07:43

## 2021-05-12 RX ADMIN — PROPOFOL 20 MG: 10 INJECTION, EMULSION INTRAVENOUS at 08:12

## 2021-05-12 RX ADMIN — Medication 5 MG: at 08:32

## 2021-05-12 RX ADMIN — FENTANYL CITRATE 25 MCG: 0.05 INJECTION, SOLUTION INTRAMUSCULAR; INTRAVENOUS at 07:33

## 2021-05-12 RX ADMIN — ONDANSETRON HYDROCHLORIDE 4 MG: 2 SOLUTION INTRAMUSCULAR; INTRAVENOUS at 07:43

## 2021-05-12 NOTE — PERIOP NOTES
Labs and EKGs done/received. [facility] cardiology consult sent to on call. Pt resting NAD and currently only c/o R Breast discomfort, tolerable with ice pk. NSR with 1AVB with some degree of KOMAL variability 0.24-0. 28.    10:35 AM  (Cut/paste message from [address] [place] [address])  Lacho Schulz - 5/12/21 10:35 AM   this is going to Dr Steven Wild    11:00 AM  Rober Pryor from Cardio consult here - labs added, ECHO notified and will be here soon for study, Holter monitor also notified. 11:30 AM  Dr Steven Wild here to see - repeat Troponin noted. Pending results    12:00 PM  Cardio here and Holter monitor Education done by technician.

## 2021-05-12 NOTE — PERIOP NOTES
PACU IN REPORT FROM ANESTHESIA    Verbal report received from   TONY Tovar   [x] MD/DO-Anesthesiologist   Tiburcio English   [x] CRNA   [x] with student    CHOICE ANESTHESIA:  [] GENERAL  [] TIVA  [x] MAC  [] LOCAL  [] REGIONAL  [] SPINAL   [] EPIDURAL   **Note the anesthesia record for medications given intraoperatively. **           [] E.R.A.S. PROTOCOL    SURGICAL PROCEDURE: Procedure(s) (LRB):  RIGHT BREAST LUMPECTOMY, ABNER CATH INSERTION, RIGHT BREAST SENTINEL NODE BIOPSY (Right)  . (Left)     SURGEON: Omari Rodarte MD.    Brief Initial Visual Assessment:    Patient Age: [] Infant(1-12mo)      []Pediatric(1-13yrs)    [] Adolescent(13-18yrs)    [] Adult(18-65yrs)      [x]Geriatric Adult(>65yrs). Patient    [] Alert           []Calm & Cooperative      [x] Anxious  Appearance: [x] Drowsy      [x] Sedated      [] Unresponsive     Oriented x  0            Airway:     [x] Patent                          [] Obstructs easily/Obstructed on arrival   [] \"Difficult Airway\" report by Anesthesia                        [] Airway improved with head/airway repositioning                       Airway Adjuncts Present: [] Oral Airway    [] Nasal Trumpet    [] ETT    [] LMA            Respiratory  [x] Even   [] Labored   [] Shallow   [] Tachypnea   [] Bradypnea  Pattern:    [x] Non-Labored  [] VENT and/or respiratory assistance     being provided. Skin:     [x] Pink [] Dusky    [] Pale        [x] Warm    [] Hot [] Cool       [] Cold   [x]Dry [] Moist [] Diaphoretic     Membranes:  [x] Pink [] Pale       [x] Moist [] Dry     [] Crusty     Pain:   [] No Acute Discomfort.    ++  /10 Scale [x] Verbal Numeric   [x] Moderate Discomfort.      [] V.A.S. [] Acute Discomfort.                [] A.N.V.    [] Chronic-Issue Related Discomfort.   [] F.L.A.C.C. Note E-MAR for medications administered.   []Faces, Farfan/Baker    Note assessments documented in flowsheets; any assessment variants to be found in comments or narrative perioperative nurse notes. OF NOTE: CRNA reports intraop 8sec asystole to ventricular escape in 30's to 2nd Degree Type 1 - Arrives in PACU with ?? Sick sinus vs AFib - EKG and labs done/sent. MDA at bedside and defib pads on from OR. Continuing to assess.     Post-anesthesia care now assumed by [facility] BSN, RN-BC

## 2021-05-12 NOTE — OP NOTES
Bassam De Los [facility]  [address] [facility],[address] [address]    Name: David Newberry  : 1945    Right Breast Lumpectomy with [place] Node Biopsy  portacath insertion   Operative Report    Date of Surgery: 2021  Preoperative Diagnosis:Right breast cancer, UOQ  Postoperative Diagnosis: same  Surgeon: Dr Leti Fiar  Assistant: Dr Magnus Banks   Anesthesia: MAC  Procedure: Right breast lumpectomy with sentinel lymph node biopsy   Indication: RIGHT breast cancer, triple negative    Procedure: David Newberry was brought into the operating room and placed supine on the table. She was sedated with IV sedation. 5cc of 1/2 strength methylene blue dye was injected in the Right subareolar space and the breast was massage for 5 minutes. The neck and chest were prepped and draped in the usual sterile fashion. 0.5% marcaine with epi was used for local anesthesia. The LEFT internal jugular vein was identified with ultrasound, and under ultrasound guidance an 18 gauge needle was inserted into the internal jugular vein. A guidewire was passed through the needle and under fluoroscopic guidance was advanced into the superior vena cava. A 3cm horizontal incision was made just inferior to the clavicle and a pocket was created for the port. The catheter was tunneled from the neck to the chest incision. A dilator with pull-away sheath was inserted over the wire and the wire and dilator were removed leaving the pull-away sheath in place. Under fluoroscopic guidance the catheter was inserted and positioned at the junction of the right atrium and the superior vena cava. The pull-away sheath was removed . The catheter was trimmed to size and attached to the port. The port was tacked to the pectoralis major fascia with 3-0 Vicryl suture. The Portacath was flushed with Heparin. The incision was closed in 2 layers.   The dermis was re-approximated with 3-0 Vicryl and the skin was closed with 4-0 Monocryl. A portable chest x-ray will be obtained in the PACU. The breast tumor was in the RIGHT upper outer quadrant and was identified with ultrasound. A 5cm oblique incision was made in the upper outer quadrant and wide excision of the mass was performed with sharp dissection and electrocautery. The specimen was marked with sutures for orientation purposes and sent to pathology. .    The deep axillary cavity was accessed from the lumpectomy incision with an incision through the clavipectoral fascia. One enlarged lymph node was removed and sent to pathology. No other lymph nodes were palpated in the axilla. During the lymph node biopsy the patient had an instant of asystole, then appeared to be in 2nd degree heart block. She will have a cardiac consult in the recovery room. Hemostasis was controlled with electrocautery. The bresat tissue was re-approximated with interrupted 3-0 vicryl sutures. The incision was closed in 2 layers with 3-0 vicryl for the subcutaneous tissue and 4-0 Monocryl for the skin. The wounds were dressed with skin glue and the patient was awakened and extubated and taken to the recovery room. Sponge, needle and instrument counts were reported be correct. Findings:  One enlarged axillary lymph node.   Estimated Blood Loss: 10 ml  Specimens:   ID Type Source Tests Collected by Time Destination   1 : Right Breast Lumpectomy Preservative Breast  Madie Aase, MD 5/12/2021 8359 Pathology   2 : Right Axillary [place] Node Preservative Axilla  Madie Aase, MD 5/12/2021 0076 Pathology      Complications: new onset 2nd degree heart block  Implants: 6.6fr portacath, LEFT IJ    Signed:  Ryan Garcia MD

## 2021-05-12 NOTE — H&P
History and Physical    HPI NEW Patient presents for consultation at the request of [facility] for newly diagnosed RIGHT breast cancer. She was not able to feel a lump in her breast. This was detected from mammogram which led to diagnostic imaging and subsequent biopsy. No history of breast biopsies or breast surgeries. Her  and daughter are here with her today. 4/2021 RIGHT invasive ductal carcinoma, grade 3, triple negative  Family history-  Maternal aunt had breast cancer, unsure what age.      Breast imaging-  DEREK Results (most recent):       Results from Hospital Encounter encounter on 04/16/21   DEREK POST BX IMAGING RT INCL CAD     Addendum Addendum:   Addendum to ultrasound-guided right breast biopsy, performed on 4/16/2021  by Dr. Efrain Goldberg: Invasive carcinoma  RESULTS CONVEYED: Yes, by Dr. Tai Ferrara, by telephone  ASSESSMENT: These results are concordant with the imaging findings. RECOMMENDATIONS: Surgical consultation. Ezra Acuna MD 4/20/2021 12:51 PM           Narrative INDICATION: 10:00 right breast suspicious mass.     The risks, benefits and alternatives of ultrasound-guided right breast biopsy  was discussed with the patient. After all questions were answered and informed  consent was obtained, the patient was prepped and draped in the standard sterile  fashion. 1% lidocaine was used as local anesthetic. Using sonographic guidance,  the 10:00 right breast mass was biopsied using a 12-gauge needle. 3 core  specimens were obtained. Samples were sent to pathology for further evaluation. Using sonographic guidance, a biopsy clip was placed within the 10:00 right  breast lesion. Post procedure sonographic and digital CC and ML mammographic  views demonstrate appropriate clip position. There is no complication. There is  no significant blood loss.  The patient was discharged in stable condition with  specific postprocedure instructions.        Impression Ultrasound-guided biopsy of 10:00 right breast lesion. Clip in  appropriate position. Pathology pending. No complication.         Review of Systems   Constitutional: Positive for malaise/fatigue. HENT: Positive for congestion. Eyes: Positive for blurred vision and redness. Respiratory: Negative. Cardiovascular: Positive for leg swelling. Gastrointestinal: Negative. Genitourinary: Negative. Musculoskeletal: Positive for falls, joint pain and neck pain. Skin: Negative. Neurological: Negative. Endo/Heme/Allergies: Negative. Psychiatric/Behavioral: Positive for memory loss. The patient is nervous/anxious.          Physical Exam  Constitutional:       Appearance: She is well-developed. Cardiovascular:      Rate and Rhythm: Normal rate and regular rhythm. Heart sounds: Normal heart sounds. Pulmonary:      Effort: Pulmonary effort is normal.      Breath sounds: Normal breath sounds. Chest:      Breasts: Breasts are symmetrical.         Right: Mass (2 cm hard fixed mass with overlying ecchymosis) present. No swelling, nipple discharge, skin change or tenderness. Left: No swelling, mass, nipple discharge, skin change or tenderness. Lymphadenopathy:      Upper Body:      Right upper body: No supraclavicular or axillary adenopathy. Left upper body: No supraclavicular or axillary adenopathy. Skin:     General: Skin is warm and dry. Neurological:      Mental Status: She is alert and oriented to person, place, and time.       Past Medical History:   Diagnosis Date    Allergic rhinitis due to other allergen     Arthritis     Cancer ([address]     right breast    Hypercholesterolemia     Psychiatric disorder     anxiety    Seizures ([address]     \"with stress\" last was 2017     Past Surgical History:   Procedure Laterality Date    HX COLONOSCOPY  2021    HX SALPINGO-OOPHORECTOMY Left     at about age 26      OB History    No obstetric history on file.        Family History Problem Relation Age of Onset    Diabetes Mother     Cancer Sister     Cancer Brother     Breast Cancer Maternal Aunt      Social History     Tobacco Use    Smoking status: Never Smoker    Smokeless tobacco: Never Used   Substance Use Topics    Alcohol use: No      Prior to Admission medications    Medication Sig Start Date End Date Taking? Authorizing Provider   escitalopram oxalate (LEXAPRO) 10 mg tablet Take 10 mg by mouth daily. Indications: anxiousness associated with depression   Yes Provider, Historical   alendronate (FOSAMAX) 35 mg tablet Take 70 mg by mouth every seven (7) days. mondays   Yes Provider, Historical   timoloL (BETIMOL) 0.25 % ophthalmic solution Administer 1-2 Drops to both eyes two (2) times a day. use in affected eye(s)   Yes Other, MD Radha   latanoprost (XALATAN) 0.005 % ophthalmic solution Administer 1 Drop to both eyes daily. Yes Provider, Historical   diclofenac EC (VOLTAREN) 75 mg EC tablet TAKE ONE TABLET BY MOUTH TWICE DAILY 1/12/14  Yes Claire Marley MD      Allergies   Allergen Reactions    Codeine Other (comments)     Other reaction(s): Other (see comments)  Hallucinations              ASSESSMENT and PLAN      ICD-10-CM ICD-9-CM     1. Malignant neoplasm of upper-outer quadrant of right breast in female, estrogen receptor negative ([facility]  C50.411 174. 4       Z17.1 V86. 1        Total time spent with patient: 50 minutes      1. RIGHT breast cancer, triple negative  2. PLAN: RIGHT lumpectomy and sentinel node biopsy and portacath insertion     3. Discussed lumpectomy and sentinel node biopsy  a. Outpatient surgery with sedation. b. Surgery will take about an hour, then an hour in the recovery room. c. The wound is closed with dissolving sutures and skin glue.  It is okay to shower after surgery. d. Swelling and bruising are normal and can last up to 2 weeks.   e. Polo Steve resume normal activities the day after surgery, but may have 1-2 weeks of pain and/or fatigue  f. Pain is usually well-controlled with ibuprofen or acetaminophen.  Hydrocodone may be prescribed if needed. g. Infection and bleeding are unlikely but possible complications.     4. Mentioned radiation.   She will meet with rad onc after surgery

## 2021-05-12 NOTE — CONSULTS
[facility] [address] [id number]  Mobile [phone]    Cardiology Consult Note    Date of  Admission: 2021  5:57 AM  PCP- MD Bhavesh Barahona MD  :  1945   MRN:  [id number]       Admission Diagnosis: RIGHT BREAST CANCER    Jorge Ruiz is a 64 y.o. female admitted for RIGHT BREAST CANCER. Consult requested by Musa Gregorio MD       Subjective:   RIGHT BREAST CANCER        Impression Plan/Recommendation   1. 8 second pause suring procedure ( anesthesia/L Port placement)  2. Mobitz 2 type I; 1st deg AV block  3. Breast CA  4. S/p breast biopsy and port placement                · Patient's rhythm and BP stable post case. ecg non ischemic   · First troponin negative- recheck another one now  · Echo to evaluate wma/LVEF  · TSH  · Not on av cande agents  · Recommend 30 day event monitor for further evaluation - patient agreeable         Reviewed above plan w the patient, her  and daughter. D/w RN, anesthesia and Dr Moreno Harris- all questions answered       Pt personally seen and examined. Chart reviewed. Agree with advanced NP's history, exam and  A/P with changes/additons. D/w Dr Shoshana Dash ( Anesthesia) - Pt had approx 8 sec pause/had ventricular escape rhythm/followed by prob Mobitz 1 Second deg AV block - followed by SR -1st deg AV block. No prior hx of CAD    Blood pressure 124/69, pulse 70, temperature 97.4 °F (36.3 °C), resp. rate 18, height 5' 5\" (1.651 m), weight 150 lb (68 kg), last menstrual period 11/15/1990, SpO2 96 %. Neck-no JVD  CVS-S1-S2 present, no murmur       A/P - Arrhythmia/Sig pause during port placement under anesthesia - ECHO- nml EF/Trop neg/Event monitor - follow up in office. Discussed with patient/nursing/family ( /daughter)    Jelani Chavez MD, [facility]      Subjective:   Jorge Ruiz is a 64 y.o. female I am seeing for 8 /6 second pause, mobitz 2 type I while under MAC in OR for port placement and breast biopsy. Pmhx of depression and breast CA. Denies hx of thyroid d/o, hypertension and DM. I spoke w anesthesia  (no rhythm strips during case) the patient had a 8 second pause followed by ventricular escape beats, then followed by a 6 second pause. Followed by Mobitz II type I AV block w intermittent 3-4 second pauses not correlating w dropped beat--> then NSr w first degree block occasional PVCs. Patient now resting comfortably in the PACU NSR 1st degree block w occ PVCs. No reported SOB or chest pain. She felt like her bra was on tight, she reports she has had this feeling before \"on and off\" for about 2 years. Denies recent falls or syncope. She reports a episodes of near syncope upon standing 2 years ago, she went to a hospital in [place]- work up negative at the time. Hisband reports hx of irregular heart beats. Family hx: no known family hx of CAD/CHF  Social hx: denies tobacco or etoh use        Past Medical History:   Diagnosis Date    Allergic rhinitis due to other allergen     Arthritis     [facility]     right breast    Hypercholesterolemia     Psychiatric disorder     anxiety    Seizures ([facility]     \"with stress\" last was 2017      Past Surgical History:   Procedure Laterality Date    HX COLONOSCOPY  2021    HX SALPINGO-OOPHORECTOMY Left     at about age 26       Hospital Medications:  Current Facility-Administered Medications   Medication Dose Route Frequency    lactated Ringers infusion  125 mL/hr IntraVENous CONTINUOUS    HYDROmorphone (DILAUDID) syringe 0.5-1 mg  0.5-1 mg IntraVENous Multiple    ondansetron (ZOFRAN) injection 4 mg  4 mg IntraVENous PRN    heparin (porcine) 100 unit/mL injection    PRN     No current facility-administered medications on file prior to encounter.       Current Outpatient Medications on File Prior to Encounter   Medication Sig Dispense Refill    escitalopram oxalate (LEXAPRO) 10 mg tablet Take 10 mg by mouth daily. Indications: anxiousness associated with depression      alendronate (FOSAMAX) 35 mg tablet Take 70 mg by mouth every seven (7) days. mondays      timoloL (BETIMOL) 0.25 % ophthalmic solution Administer 1-2 Drops to both eyes two (2) times a day. use in affected eye(s)      latanoprost (XALATAN) 0.005 % ophthalmic solution Administer 1 Drop to both eyes daily.  diclofenac EC (VOLTAREN) 75 mg EC tablet TAKE ONE TABLET BY MOUTH TWICE DAILY 60 Tab 2         Allergies   Allergen Reactions    Codeine Other (comments)     Other reaction(s): Other (see comments)  Hallucinations             Review of Symptoms:  All other systems reviewed are negative except as above. Physical Exam    Visit Vitals  /71   Pulse 64   Temp 97.4 °F (36.3 °C)   Resp 15   Ht 5' 5\" (1.651 m)   Wt 150 lb 5.7 oz (68.2 kg)   LMP 11/15/1990 (Exact Date)   SpO2 96%   BMI 25.02 kg/m²     General Appearance:  Well developed, elderly,alert and oriented x 3, and individual in no acute distress. Ears/Nose/Mouth/Throat:   Hearing grossly normal.Normal oral mucosa,no scleral icterus     Neck: Supple no JVD    Chest:   Lungs clear to auscultation bilaterally,no rales rhonchi or wheezing   Cardiovascular:  Regular rate and rhythm, no Murmur. Abdomen:   Soft, non-tender, bowel sounds are active. Extremities: No edema bilaterally. Pulses detected, no varicosities   Skin: Warm and dry.    Neuro  Moves all extermities and neurologically intact                                                       Cardiographics    Telemetry: NSR w first deg block occ PVCS  ECG: NSR 1st deg block  ecg immediate post opt- mobitz type 1--> nsr first degree block w PVC    Cardiac testing      Recent Labs     05/12/21  0910   TROIQ <0.05       Recent Labs     05/12/21  0910      K 4.6   CO2 32   BUN 15   CREA 0.67   GLU 96   PHOS 3.6   MG 2.0   WBC 5.0   HGB 11.4*   HCT 36.0    I have discussed the diagnosis with the patient and the intended plan as seen in the above orders. Questions were answered concerning future plans. I have discussed medication side effects and warnings with the patient as well. Charbel Russell is in agreement to the plan listed above and wishes to proceed. she  was instructed not to smoke, eat heart healthy diet  and to exercise. Thank you for this consult.     Magdaleno Shay NP

## 2021-05-12 NOTE — ANESTHESIA POSTPROCEDURE EVALUATION
Procedure(s):  RIGHT BREAST LUMPECTOMY, ABNER CATH INSERTION, RIGHT BREAST SENTINEL NODE BIOPSY  . Katherine Perez    MAC    Anesthesia Post Evaluation      Multimodal analgesia: multimodal analgesia not used between 6 hours prior to anesthesia start to PACU discharge  Patient location during evaluation: PACU  Patient participation: complete - patient participated  Level of consciousness: awake  Pain management: adequate  Airway patency: patent  Anesthetic complications: no  Cardiovascular status: acceptable, blood pressure returned to baseline and hemodynamically stable  Respiratory status: acceptable  Hydration status: acceptable  Comments: Pt with approx 8 second asystolic episode in OR followed by atrial arrhythmia which improved by the time pt was in PACU. 12 lead EKG in PACU shows a fib, HR 60s. Cardiology consulted - bedside echo done and plan for holter monitor and outpatient followup. Pt has been stable throughout PACU stay. Post anesthesia nausea and vomiting:  controlled      INITIAL Post-op Vital signs:   Vitals Value Taken Time   /67 05/12/21 1155   Temp 36.3 °C (97.4 °F) 05/12/21 0908   Pulse 80 05/12/21 1155   Resp 22 05/12/21 1155   SpO2 96 % 05/12/21 1158   Vitals shown include unvalidated device data.

## 2021-05-12 NOTE — PERIOP NOTES
POST ANESTHESIA CARE    DISCHARGE / TRANSFER NOTE    Omid Power was   discharged    via    wheel chair     to    private vehicle    . Patient was escorted by      nurse . Patient verbalized   appreciation and was very pleased with care received   throughout their stay. Patient was discharged in     pleasant mood    . Pain at discharge/transfer was      3  /10. OF NOTE: ECHO complete, LVEF 55-60% and repeat Troponin WNL. Instructions for Holter monitor verbalized understood. Discharge, medication and follow-up instructions were verbalized as understood prior to discharge  (if applicable for same-day procedures being discharged.)    All personal belongings have been returned to patient, and patient/family verbally confirm receiving belongings as all present.     Signed: Mundo GÓMEZN RN-BC

## 2021-05-12 NOTE — ANESTHESIA PREPROCEDURE EVALUATION
Relevant Problems   GASTROINTESTINAL   (+) Esophageal reflux      PERSONAL HX & FAMILY HX OF CANCER   (+) Breast cancer, right (HCC)       Anesthetic History   No history of anesthetic complications            Review of Systems / Medical History  Patient summary reviewed, nursing notes reviewed and pertinent labs reviewed    Pulmonary  Within defined limits                 Neuro/Psych     seizures: well controlled         Cardiovascular  Within defined limits                Exercise tolerance: >4 METS     GI/Hepatic/Renal     GERD           Endo/Other        Arthritis and cancer     Other Findings   Comments: Breast ca           Physical Exam    Airway  Mallampati: II    Neck ROM: normal range of motion   Mouth opening: Normal     Cardiovascular  Regular rate and rhythm,  S1 and S2 normal,  no murmur, click, rub, or gallop  Rhythm: regular  Rate: normal         Dental  No notable dental hx       Pulmonary  Breath sounds clear to auscultation               Abdominal  GI exam deferred       Other Findings            Anesthetic Plan    ASA: 2  Anesthesia type: MAC          Induction: Intravenous  Anesthetic plan and risks discussed with: Patient

## 2021-05-12 NOTE — DISCHARGE INSTRUCTIONS
Discharge Instructions from Dr. Paula Martinez    Patient Discharge Instructions    Cristofer Lau / [id number] : 1945    Admitted 2021 Discharged: 2021   What to do at Home  Diet:Regular  Activity: As tolerated. No driving if taking pain medication. Okay to shower or take a bath. You may chose to wear a bra to sleep in for extra support for the next few days. Pain control: Ice pack 20 minutes of every hour until you go to bed tonight. You may use over-the-counter medication as needed (acetominophen, aspirin, ibuprofen). Call me for a prescription for hydrocodone if needed. Tomorrow you may put a heat pack on the breast.  Dressing: The skin glue is waterproof. It is okay to wash normally at this site. If the glue is still present after 10 days you should peel it off. Follow up: the office will call this afternoon with a follow up appointment for Tuesday May 18th. [place] [address], [address]  Problems/Questions: Call Jory Slater MD on my cell phone. [phone]          DISCHARGE SUMMARY from Your Nurse    PATIENT INSTRUCTIONS:    AFTER ANESTHESIA & SEDATION, and WHILE TAKING PAIN MEDICINE  After general anesthesia / intravenous sedation and the 24 hours following, and/or while taking prescription Opiates:  · Limit your activities  · Do not drive and operate hazardous machinery until you have been of all narcotics and sedatives for over 24 hours  · Do not make important personal or business decisions  · Do  not drink alcoholic beverages  · If you have not urinated within 8 hours after discharge, please contact your surgeon on call.         SIGNS OF INFECTION, THINGS TO REPORT TO YOUR DOCTOR  Report the following Signs of Infection or General Problems after surgery to your surgeon:  · Excessive pain, swelling, redness, drainage, pus or odor of or around the surgical area  · Fever/ temperature over 101; Temperature over 100 if on medications (chemotherapy or medicines which affect your ability to fight infections)  · Nausea and vomiting lasting longer than 4 hours or if unable to take medications  · Any signs of decreased circulation or nerve impairment to extremity: change in color, persistent  numbness, tingling, coldness or increase pain  · If you have any questions. GOOD HELP TO FIGHT AN INFECTION  Here are a few tip to help reduce the chance of getting an infection after surgery:   Wash Your Hands   Good handwashing is the most important thing you and your caregiver can do.  Wash before and after caring for any wounds. Dry your hand with a clean towel.  Wash with soap and water for at least 20 seconds. A TIP: sing the \"Happy Birthday\" song through one time while washing to help with the timing.  Use a hand  in between washings.  Shower   When your surgeon says it is OK to take a shower, use a new bar of antibacterial soap (if that is what you use, and keep that bar of soap ONLY for your use), or antibacterial body wash.  Use a clean wash cloth or sponge when you bathe.  Dry off with a clean towel  after every bath - be careful around any wounds, skin staples, sutures or surgical glue over/on wounds.  Do not enter swimming pools, hot tubs, lakes, rivers and/or ocean until wounds are healed and your doctor/surgeon says it is OK.  Use Clean Sheets   Sleep on freshly laundered sheets after your surgery.  Keep the surgery site covered with a clean, dry bandage (if instructed to do so). If the bandage becomes soiled, reapply a new, dry, clean bandage.  Do not allow pets to sleep with you while your wound is healing.  Lifestyle Modification and Controlling Your Blood Sugar   Smoking slows wound healing. Stop smoking and limit exposure to second-hand smoke.  High blood sugar slows wound healing.   Eat a well-balanced diet to provide proper nutrition while healing   Monitor your blood sugar (if you are a diabetic) and take your medications as you are suppose to so you can control you blood sugar after surgery. COUGH AND DEEP BREATHE  Breathing deep and coughing are very important exercises to do after surgery. Deep breathing and coughing open the little air tubes and air sacks in your lungs. You take deep breaths every day. You may not even notice - it is just something you do when you sigh or yawn. It is a natural exercise you do to keep these air passages open. After surgery, take deep breaths and cough, on purpose. Coughing and deep breathing help prevent bronchitis and pneumonia after surgery. If you had chest or belly surgery, use a pillow as a \"hug florencia\" and hold it tightly to your chest or belly when you cough. DIRECTIONS:  1. Take 10 to 15 slow deep breaths every hour while awake. 2. Breathe in deeply, and hold it for 2 seconds. 3. Exhale slowly through puckered lips, like blowing up a balloon. 4. After every 4th or 5th deep breath, hug your pillow to your chest or belly and give a hard, deep cough. Yes, it will probably hurt if you had abdominal surgery. But doing this exercise is very important part of healing after surgery. Take your pain medicine to help you do this exercise without too much pain. ANKLE PUMPS    Ankle pumps increase the circulation of oxygenated blood to your lower extremities and decrease your risk for circulation problems such as blood clots. They also stretch the muscles, tendons and ligaments in your foot and ankle, and prevent joint contracture in the ankle and foot, especially after surgeries on the legs. It is important to do ankle pump exercises regularly after surgery because immobility increases your risk for developing a blood clot. Your doctor may also have you take an Aspirin for the next few days as well. If your doctor did not ask you to take an Aspirin, consult with him before starting Aspirin therapy on your own.     The exercise is quite simple. · Slowly point your foot forward, feeling the muscles on the top of your lower leg stretch, and hold this position for 5 seconds. · Next, pull your foot back toward you as far as possible, stretching the calf muscles, and hold that position for 5 seconds. · Repeat with the other foot. · Perform 10 repetitions every hour while awake for both ankles if possible (down and then up with the foot once is one repetition). You should feel gentle stretching of the muscles in your lower leg when doing this exercise. If you feel pain, or your range of motion is limited, don't push too hard. Only go the limit your joint and muscles will let you go. If you have increasing pain, progressively worsening leg warmth or swelling, STOP the exercise and call your doctor. Other Wound Care information:  [] No additional recommendations. · KEEP MONITOR PHONE WITHIN 30 FEET OF OF MONITOR DEVICE (ATTACHED) AT ALL TIMES  · FOLLOW INSTRUCTIONS REGARDING CHARGING DEVICES  · CALL Dr Ashley Fry                       Below is information on the medication(s) your doctor is prescribing for you: The maximum daily dose of acetaminophen was discussed with the patient. She was encouraged not to exceed 3,000 mg of acetaminophen during a 24 hour period and was asked to keep in mind that acetaminophen can also be found in many over-the-counter cold medications as well as narcotics that may be given for pain. The patient expresses understanding of these issues and questions were answered. 4 THINGS ABOUT PAIN MEDICINE I ALWAYS TALK ABOUT:  There are 4 side effects I always talk about for pain medications. 1. They make you sleepy and drowsy. Do not drive a car or operate machines while taking pain medication. Do not make any major decisions. Take a nap. Relax. Let your body recover from the affects of anesthesia and surgery.     2. Some people have quite a problem with itching and. ..  3. Nausea and/or vomiting. These are mention together because they are a related genetic issue; while some people experience these problems, others do not. These are expected and know side effects. Itching is caused by histamine release - practically all opiate medications can cause this. An over-the-counter anti-histamine can help. Over-the-counter Benadryl® (the generic drug name is diphenhydramine) can help, but may cause increased drowsiness which can be intensified by pain medications. Over-the-counter Claritin® (the generic drug name is loratadine) or Zyrtec® (the generic drug name is cetirizine) may be effective without as much drowsiness as with the Benadryl/diphenhydramine. If you have nausea, like the itching, practically all opioids can cause this. Hopefully your surgeon may have given you some medicine for nausea. If your surgeon did not give you anti-nausea medications, and you are experiencing nausea/vomiting that prevent you from drinking clear liquids, CALL HIM/HER and request them, especially if these issues seem to get worse after you leave the hospital.    4. Last but not least is the problem of constipation (not coral able to have a bowel movement - poop.)  All pain medicine can slow down the movement of food through the gut. The slower it goes, the worse it can be. This only adds insult to the injury of surgery. And if you had tummy surgery, like having your gall bladder removed or a hernia repair, YOU DO NOT WANT THIS PROBLEM. There are 4 things I recommend. · Drink lots of fluids. For healthy people with no heart problems, this means at least 64 ounces of liquids or more per day. For example, a Big Gulp® from [employer] is 32 ounces. So you need to drink at least 2  Big Gulp®'s of fluids every day. If you have heart problems you may not be able to do this. Talk to your doctor about what you should do to prevent constipation.     · Drinking fruit juice like apple, pear, or prune juice gives you extra \"BANG\" for your beverage. These drinks are high in natural fiber. If you are a diabetic, drink sugar-free fluids with fiber additives (see next 2 points.)  Avoid drinking extra fruit juice unless this is a regular part of your diet plan. · Eat extra fresh fruits and vegetables. · Add extra fiber-products. Fiber products like Metamucil®, Citrucel®, Miralax® or Benefiber® can help. These products are over-the-counter and you do not need a prescription from your doctor. If you have followed these recommendations and still have some difficulty having a good poop, take and over-the-counter stimulant like Dulcolax® (biscodyl)  or Senokot® (senna concentrate). These may help get things moving. Thomas Montoya MEDICATION AND   SIDE EFFECT GUIDE    The [facility] MEDICATION AND SIDE EFFECT GUIDE was provided to the PATIENT AND CARE PROVIDER. Information provided includes instruction about drug purpose and common side effects for the following medications:   Over-the-Counter TYLENOL (Acetaminophen) and/or MOTRIN (Ibuprofen) (follow package instructions) - call Dr Kerry Agustin if your pain is uncontrolled and require something else. Medication information added to discharge record on May 12, 2021 at 9:40 AM.      Some information we wish all of our patients to be familiar with and General Information for Healthy Lifestyle choices:    · Make a list of your current medications with your Primary Care Provider. · Update this list whenever your medications are discontinued, doses are changed, or new medications (including over-the-counter products like ibuprofen, vitamins, or herbal remedies) are added.   · Carry medication information at all times in case of emergency situations      No smoking / No tobacco products / Avoid exposure to second hand smoke    Surgeon General's Warning:  Quitting smoking now greatly reduces serious risk to your health. Obesity, smoking, and sedentary lifestyle greatly increases your risk for illness. A healthy diet, regular physical exercise & weight monitoring are important for maintaining a healthy lifestyle. A Note About Congestive Heart Failure: You may be retaining fluid if you have a history of heart failure or if you experience any of the following symptoms:      · Weight gain of 3 pounds or more overnight or 5 pounds in a week  · Increased swelling in our hands or feet  · Shortness of breath while lying flat in bed      Please call your doctor as soon as you notice any of these symptoms; do not wait until your next office visit. A Note About Strokes:  Recognize signs and symptoms of STROKE. The simple mnemonic, F.A.S.T., can help you remember signs of a stroke and what to do if you suspect a stroke is occuring to you or someone you are with:    F - Face looks uneven  A - Arms unable to move, or move evenly  S - Speech is slurred or non-existent  T - Time - CALL 911 as soon as signs and symptoms begin - DO NOT go to bed or wait to see if you get better - TIME IS BRAIN. Warning Signs of HEART ATTACK   Call 911 if you have these symptoms:     Chest discomfort. Most heart attacks involve discomfort in the center of the chest that lasts more than a few minutes, or that goes away and comes back. It can feel like uncomfortable pressure, squeezing, fullness, or pain.  Discomfort in other areas of the upper body. Symptoms can include pain or discomfort in one or both arms, the back, neck, jaw, or stomach.  Shortness of breath with or without chest discomfort.  Other signs may include breaking out in a cold sweat, nausea, or lightheadedness. Don't wait more than five minutes to call 911 - MINUTES MATTER! Fast action can save your life. Calling 911 is almost always the fastest way to get lifesaving treatment.  Emergency Medical Services staff can begin treatment when they arrive -- up to an hour sooner than if someone gets to the hospital by car. Learning About Coronavirus [phone])  Coronavirus [phone]): Overview  What is coronavirus (COVID-19)? The coronavirus disease (COVID-19) is caused by a virus. It is an illness that was first [facility]. It has since spread worldwide. The virus can cause fever, cough, and trouble breathing. In severe cases, it can cause pneumonia and make it hard to breathe without help. It can cause death. Coronaviruses are a large group of viruses. They cause the common cold. They also cause more serious illnesses like Middle East respiratory syndrome (MERS) and severe acute respiratory syndrome (SARS). COVID-19 is caused by a novel coronavirus. That means it's a new type that has not been seen in people before. This virus spreads person-to-person through droplets from coughing and sneezing. It can also spread when you are close to someone who is infected. And it can spread when you touch something that has the virus on it, such as a doorknob or a tabletop. What can you do to protect yourself from coronavirus (COVID-19)? The best way to protect yourself from getting sick is to:  · Avoid areas where there is an outbreak. · Avoid contact with people who may be infected. · Wash your hands often with soap or alcohol-based hand sanitizers. · Avoid crowds and try to stay at least 6 feet away from other people. · Wash your hands often, especially after you cough or sneeze. Use soap and water, and scrub for at least 20 seconds. If soap and water aren't available, use an alcohol-based hand . · Avoid touching your mouth, nose, and eyes. What can you do to avoid spreading the virus to others? To help avoid spreading the virus to others:  · Cover your mouth with a tissue when you cough or sneeze. Then throw the tissue in the trash. · Use a disinfectant to clean things that you touch often.   · Stay home if you are sick or have been exposed to the virus. Don't go to school, work, or public areas. And don't use public transportation. · If you are sick:  ? Leave your home only if you need to get medical care. But call the doctor's office first so they know you're coming. And wear a face mask, if you have one.  ? If you have a face mask, wear it whenever you're around other people. It can help stop the spread of the virus when you cough or sneeze. ? Clean and disinfect your home every day. Use household  and disinfectant wipes or sprays. Take special care to clean things that you grab with your hands. These include doorknobs, remote controls, phones, and handles on your refrigerator and microwave. And don't forget countertops, tabletops, bathrooms, and computer keyboards. When to call for help  Call 911 anytime you think you may need emergency care. For example, call if:  · You have severe trouble breathing. (You can't talk at all.)  · You have constant chest pain or pressure. · You are severely dizzy or lightheaded. · You are confused or can't think clearly. · Your face and lips have a blue color. · You pass out (lose consciousness) or are very hard to wake up. Call your doctor now if you develop symptoms such as:  · Shortness of breath. · Fever. · Cough. If you need to get care, call ahead to the doctor's office for instructions before you go. Make sure you wear a face mask, if you have one, to prevent exposing other people to the virus. Where can you get the latest information? The following health organizations are tracking and studying this virus. Their websites contain the most up-to-date information. Jess Mccall also learn what to do if you think you may have been exposed to the virus. · U.S. Centers for Disease Control and Prevention (CDC): The CDC provides updated news about the disease and travel advice. The website also tells you how to prevent the spread of infection.  www.cdc.gov  · World Health Organization [facility]): WHO offers information about the virus outbreaks. WHO also has travel advice. www.who.int  Current as of: April 1, 2020               Content Version: 12.4  © 2006-2020 [employer]. Care instructions adapted under license by your healthcare professional. If you have questions about a medical condition or this instruction, always ask your healthcare professional. [address] any warranty or liability for your use of this information. AT THE COMPLETION OF DISCHARGE INSTRUCTION REVIEW, WE VERIFY:  The discharge information has been reviewed with the patient and caregiver. Questions have been asked and answered meeting patient and caregiver expectations. The patient and caregiver verbalized understanding. Your discharge nurse was Keisha Murphy RN-BC       Board Certified - Pain Management      CONTENTS FOUND IN YOUR DISCHARGE ENVELOPE:  [x]     Surgeon and Hospital Discharge Instructions  [x]     [facility] Care Provider Card  []     Medication & Side Effect Guide            (your newly prescribed medications have been marked/highlighted showing the most common side effects from the classes of drugs on your prescriptions)  []     Medication Prescription(s) x 0 ( [] These have been sent electronically to your pharmacy by your surgeon,   - OR -       your surgeon has already provided these to you during a previous/pre-op office visit)  []     Pain block and/or block with On-Q Catheter from Anesthesia Service (information included in your instructions above)        []    [facility] Education Information  []     Physical Therapy Prescription  []     Follow-up Appointment Cards  []     Surgery-related Pictures/Media  []     Medical device information sheets/pamphlets from their    []     School/work excuse note. []     /parent work excuse note.       The following personal items collected during your admission for safe keeping are returned to you: Dental Appliance: Dental Appliances: None  Vi christiano: Visual Aid: Glasses, With patient  Hearing Aid:    Jewelry: Jewelry: None  Clothing: Clothing: Shirt, Pants, Undergarments, Footwear, Jacket/Coat  Other Valuables: Other Valuables: Eyeglasses  Valuables sent to safe: You will go home with a  30 day heart monitor, please follow instructions included. If you have any questions please call the office at [phone].

## 2021-05-14 LAB
ATRIAL RATE: 33 BPM
ATRIAL RATE: 63 BPM
CALCULATED P AXIS, ECG09: 76 DEGREES
CALCULATED R AXIS, ECG10: 59 DEGREES
CALCULATED R AXIS, ECG10: 65 DEGREES
CALCULATED T AXIS, ECG11: 38 DEGREES
CALCULATED T AXIS, ECG11: 40 DEGREES
DIAGNOSIS, 93000: NORMAL
DIAGNOSIS, 93000: NORMAL
P-R INTERVAL, ECG05: 280 MS
Q-T INTERVAL, ECG07: 436 MS
Q-T INTERVAL, ECG07: 436 MS
QRS DURATION, ECG06: 84 MS
QRS DURATION, ECG06: 90 MS
QTC CALCULATION (BEZET), ECG08: 438 MS
QTC CALCULATION (BEZET), ECG08: 446 MS
VENTRICULAR RATE, ECG03: 61 BPM
VENTRICULAR RATE, ECG03: 63 BPM

## 2021-05-18 ENCOUNTER — OFFICE VISIT (OUTPATIENT)
Dept: SURGERY | Age: 76
End: 2021-05-18
Payer: MEDICARE

## 2021-05-18 VITALS
DIASTOLIC BLOOD PRESSURE: 63 MMHG | SYSTOLIC BLOOD PRESSURE: 117 MMHG | HEART RATE: 65 BPM | BODY MASS INDEX: 24.99 KG/M2 | WEIGHT: 150 LBS | HEIGHT: 65 IN

## 2021-05-18 DIAGNOSIS — N64.89 HEMATOMA OF RIGHT BREAST: Primary | ICD-10-CM

## 2021-05-18 PROCEDURE — 99024 POSTOP FOLLOW-UP VISIT: CPT | Performed by: SURGERY

## 2021-05-18 NOTE — PROGRESS NOTES
HISTORY OF PRESENT ILLNESS  Georgia Brothers is a 64 y.o. female. HPI ESTABLISHED patient POD #6 RIGHT lumpectomy and luisana cath insertion. The patient is healing well and denies any signs of infection. 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 ASPIRATION OF HEMATOMA  Indication : Hematoma: Right Breast upper outer quadrant  Prep : Alcohol. Guidance : Ultrasound guidance. Yield :  44cc old blood was aspirated with an 18 gauge needle. Effect : The hematoma was aspirated. Diposition:  Follow up in 2 weeks  ASSESSMENT and PLAN    ICD-10-CM ICD-9-CM    1.  Hematoma of right breast  N64.89 611.89      RIGHT breast hematoma aspirated  Follow up with Dr Car Leavitt in 2 weeks

## 2021-05-25 ENCOUNTER — OFFICE VISIT (OUTPATIENT)
Dept: SURGERY | Age: 76
End: 2021-05-25
Payer: MEDICARE

## 2021-05-25 VITALS
SYSTOLIC BLOOD PRESSURE: 106 MMHG | HEART RATE: 57 BPM | WEIGHT: 150 LBS | BODY MASS INDEX: 24.99 KG/M2 | DIASTOLIC BLOOD PRESSURE: 62 MMHG | HEIGHT: 65 IN

## 2021-05-25 DIAGNOSIS — Z85.3 HX OF BREAST CANCER: Primary | ICD-10-CM

## 2021-05-25 PROCEDURE — 99024 POSTOP FOLLOW-UP VISIT: CPT | Performed by: SURGERY

## 2021-05-25 NOTE — PROGRESS NOTES
HISTORY OF PRESENT ILLNESS  Kendall Pryor is a 64 y.o. female. HPI  ESTABLISHED patient POD #13 RIGHT lumpectomy and luisana cath insertion for hematoma aspiration. She has pain and swelling in the RIGHT breast.      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 ASPIRATION OF HEMATOMA  Indication : Hematoma: Right Breast upper outer quadrant  Prep : Alcohol. Guidance : Ultrasound guidance. Yield :  80 cc old blood was aspirated with an 18 gauge needle. Effect : The hematoma was aspirated. Diposition:  Follow up in 2 weeks  ASSESSMENT and PLAN    ICD-10-CM ICD-9-CM    1. Hx of breast cancer  Z85.3 V10.3      1. Hematoma aspirated. She had 40cc last week, 80cc this week. Follow up next week if fluid accumulates.

## 2021-05-25 NOTE — LETTER
5/25/2021    Patient: Francisco Self   YOB: 1945   Date of Visit: 5/25/2021     Geno Winston, [address] [address]  Via Fax: [phone]    Dear Geno Winston MD,      Thank you for referring Ms. Uzair Spencer to [address] [facility] for evaluation. My notes for this consultation are attached. If you have questions, please do not hesitate to call me. I look forward to following your patient along with you.       Sincerely,    Heide Lubin MD

## 2021-05-25 NOTE — PATIENT INSTRUCTIONS

## 2021-05-28 ENCOUNTER — TELEPHONE (OUTPATIENT)
Dept: ONCOLOGY | Age: 76
End: 2021-05-28

## 2021-06-01 ENCOUNTER — OFFICE VISIT (OUTPATIENT)
Dept: ONCOLOGY | Age: 76
End: 2021-06-01
Payer: MEDICARE

## 2021-06-01 VITALS
WEIGHT: 150 LBS | DIASTOLIC BLOOD PRESSURE: 67 MMHG | HEART RATE: 66 BPM | HEIGHT: 65 IN | SYSTOLIC BLOOD PRESSURE: 143 MMHG | TEMPERATURE: 97 F | BODY MASS INDEX: 24.99 KG/M2 | OXYGEN SATURATION: 98 % | RESPIRATION RATE: 17 BRPM

## 2021-06-01 DIAGNOSIS — Z17.1 MALIGNANT NEOPLASM OF UPPER-OUTER QUADRANT OF RIGHT BREAST IN FEMALE, ESTROGEN RECEPTOR NEGATIVE (HCC): Primary | ICD-10-CM

## 2021-06-01 DIAGNOSIS — C50.411 MALIGNANT NEOPLASM OF UPPER-OUTER QUADRANT OF RIGHT BREAST IN FEMALE, ESTROGEN RECEPTOR NEGATIVE (HCC): Primary | ICD-10-CM

## 2021-06-01 PROCEDURE — 99215 OFFICE O/P EST HI 40 MIN: CPT | Performed by: NURSE PRACTITIONER

## 2021-06-01 PROCEDURE — G0463 HOSPITAL OUTPT CLINIC VISIT: HCPCS | Performed by: NURSE PRACTITIONER

## 2021-06-01 RX ORDER — ONDANSETRON HYDROCHLORIDE 8 MG/1
8 TABLET, FILM COATED ORAL
Qty: 24 TABLET | Refills: 3 | Status: SHIPPED | OUTPATIENT
Start: 2021-06-01 | End: 2022-02-03 | Stop reason: ALTCHOICE

## 2021-06-01 RX ORDER — PROCHLORPERAZINE MALEATE 10 MG
10 TABLET ORAL
Qty: 50 TABLET | Refills: 5 | Status: SHIPPED | OUTPATIENT
Start: 2021-06-01 | End: 2022-02-03 | Stop reason: ALTCHOICE

## 2021-06-01 RX ORDER — LIDOCAINE AND PRILOCAINE 25; 25 MG/G; MG/G
CREAM TOPICAL AS NEEDED
Qty: 30 G | Refills: 0 | Status: SHIPPED | OUTPATIENT
Start: 2021-06-01 | End: 2022-08-04 | Stop reason: ALTCHOICE

## 2021-06-01 NOTE — PATIENT INSTRUCTIONS
Prognosis: Good     This is our best current assessment. Cancers respond differently to treatment. Overall prognosis depends on many factors including other conditions, cancer stage, side effects, and other unforeseen events. Goal of therapy: Curative     Expected response to treatment:  Very good: Anticipate remission (no sign of cancer) and possible cancer cure    Treatment benefits and harms:  We discussed potential short term side effects to include:see handouts    Long term side effects of treatment:  see handouts    Quality of life: Quality of life concerns have been addressed. Treatment as outlined is expected to have minimal impact on patients quality of life.

## 2021-06-01 NOTE — PROGRESS NOTES
Chemotherapy education folder provided to the patient and family. Chemotherapy education provided to the patient and family. The patient and family verbalized understanding and denied any questions or concerns.

## 2021-06-01 NOTE — PROGRESS NOTES
[facility] at [address], [address] [facility]  [place] : [phone]  F: [phone]      Reason for Visit:   Pati Singer is a 64 y.o. female who is seen in consultation at the request of Dr. Bessie Ascencio for evaluation of therapy for breast cancer    Treatment History:   · 3/12/21 GEJ bx:  Squamocolumnar mucosa with reactive changes  · 4/16/21 right breast 1:00 core bx:  IDC, gr 3, 1.3 cm, gr 3, ER negative, SD negative, HER 2 negative at [facility] 1+  · 5/12/21 right breast lumpectomy: IDC with medullary features, 1.9 cm, gr 3, 0/1 LN, ER negative, SD negative, HER 2 negative at IHC 0, pT1c pN0 cM0    History of Present Illness: An abnormal mammogram led to the pathology above    Interval history:  Gr 1 fatigue, gr 1 itching, gr 1 neuropathy    FH:  Maternal aunt with possible breast cancer at an unknown age; sister with ovarian cancer in her 62s; no prostate or pancreas cancer    Past Medical History:   Diagnosis Date    Allergic rhinitis due to other allergen     Arthritis     Cancer ([address]     right breast    Hypercholesterolemia     Psychiatric disorder     anxiety    Seizures ([address]     \"with stress\" last was 2017      Past Surgical History:   Procedure Laterality Date    HX BREAST LUMPECTOMY Right 5/12/2021    RIGHT BREAST LUMPECTOMY, ABNER CATH INSERTION, RIGHT BREAST SENTINEL NODE BIOPSY performed by Andrews Saunders MD at [address] HX COLONOSCOPY  2021    HX SALPINGO-OOPHORECTOMY Left     at about age 26      Social History     Tobacco Use    Smoking status: Never Smoker    Smokeless tobacco: Never Used   Substance Use Topics    Alcohol use: No      Family History   Problem Relation Age of Onset    Diabetes Mother     Cancer Sister     Cancer Brother     Breast Cancer Maternal Aunt      Current Outpatient Medications   Medication Sig    escitalopram oxalate (LEXAPRO) 10 mg tablet Take 10 mg by mouth daily.  Indications: anxiousness associated with depression    alendronate (FOSAMAX) 35 mg tablet Take 70 mg by mouth every seven (7) days. mondays    timoloL (BETIMOL) 0.25 % ophthalmic solution Administer 1-2 Drops to both eyes two (2) times a day. use in affected eye(s)    latanoprost (XALATAN) 0.005 % ophthalmic solution Administer 1 Drop to both eyes daily.  diclofenac EC (VOLTAREN) 75 mg EC tablet TAKE ONE TABLET BY MOUTH TWICE DAILY     No current facility-administered medications for this visit. Allergies   Allergen Reactions    Codeine Other (comments)     Other reaction(s): Other (see comments)  Hallucinations        Review of Systems: A complete review of systems was obtained, negative except as described above.     Physical Exam:     Visit Vitals  BP (!) 143/67 (BP 1 Location: Left arm, BP Patient Position: Sitting, BP Cuff Size: Adult)   Pulse 66   Temp 97 °F (36.1 °C)   Resp 17   Ht 5' 5\" (1.651 m)   Wt 150 lb (68 kg)   LMP 11/15/1990 (Exact Date)   SpO2 98%   BMI 24.96 kg/m²     ECOG PS: 0    Constitutional: Appears well-developed and well-nourished in no apparent distress      Mental status: Alert and awake, Oriented to person/place/time, Able to follow commands    Eyes: EOM normal, Sclera normal, No visible ocular discharge    HENT: Normocephalic, atraumatic    Mouth/Throat: Moist mucous membranes    External Ears normal    Neck: No visualized mass    Pulmonary/Chest: Respiratory effort normal, No visualized signs of difficulty breathing or respiratory distress    Musculoskeletal: Normal gait with no signs of ataxia, Normal range of motion of neck    Neurological: No facial asymmetry (Cranial nerve 7 motor function), No gaze palsy    Skin: No significant exanthematous lesions or discoloration noted on facial skin    Psychiatric: Normal affect, No hallucinations               Results:     Lab Results   Component Value Date/Time    WBC 5.0 05/12/2021 09:10 AM    HGB 11.4 (L) 05/12/2021 09:10 AM    HCT 36.0 05/12/2021 09:10 AM    PLATELET 392 37/45/8370 09:10 AM    MCV 96.5 05/12/2021 09:10 AM    ABS. NEUTROPHILS 3.3 05/12/2021 09:10 AM     Lab Results   Component Value Date/Time    Sodium 141 05/12/2021 09:10 AM    Potassium 4.6 05/12/2021 09:10 AM    Chloride 104 05/12/2021 09:10 AM    CO2 32 05/12/2021 09:10 AM    Glucose 96 05/12/2021 09:10 AM    BUN 15 05/12/2021 09:10 AM    Creatinine 0.67 05/12/2021 09:10 AM    GFR est AA >60 05/12/2021 09:10 AM    GFR est non-AA >60 05/12/2021 09:10 AM    Calcium 8.4 (L) 05/12/2021 09:10 AM     Lab Results   Component Value Date/Time    Bilirubin, total 0.5 05/12/2021 09:10 AM    ALT (SGPT) 25 05/12/2021 09:10 AM    Alk.  phosphatase 85 05/12/2021 09:10 AM    Protein, total 5.8 (L) 05/12/2021 09:10 AM    Albumin 3.2 (L) 05/12/2021 09:10 AM    Globulin 2.6 05/12/2021 09:10 AM     3/30/21 dexa  Findings:     Fractures identified on Lateral scanogram:  None     Femoral Neck Left:  Bone mineral density (gm/cm2):  0.698 g/cm?  % of peak bone mass:  67%  % for age matched controls:  92%  T-score:  -2.4   Z-score:  -0.4      Femoral Neck Right:  Bone mineral density (gm/cm2):  0.710 g/cm?  % of peak bone mass:  68%  % for age matched controls:  94%  T-score:  -2.4   Z-score:  -0.3      Total Hip Left:  Bone mineral density (gm/cm2):  0.774 g/cm?  % of peak bone mass:  77%  % for age matched controls:  100%  T-score:  -1.9   Z-score:  0.0      Total Hip Right:  Bone mineral density (gm/cm2):  0.736 g/cm?  % of peak bone mass:  73%  % for age matched controls:  95%  T-score:  -2.2   Z-score:  -0.3      33% Radius Left:  Bone mineral density (gm/cm2):  0.446 g/cm?  % of peak bone mass:  63%  % for age matched controls:  63%  T-score:  -3.7   Z-score:  -1.2      IMPRESSION     This patient is osteoporotic using the World Health Organization criteria  As compared to the prior study, there has been a significant 5.6% increase in  the left total hip and a significant 9% increase in the right total hip    4/12/21 right breast US  IMPRESSION should read \"BI-RADS 4. Suspicious abnormality. Recommend histologic  correlation. 1.8 cm x 1.6 cm x 1.2 cm 10:00 right breast lesion. Records reviewed and summarized above. Pathology report(s) reviewed above. Radiology report(s) reviewed above. Assessment/plan:   1. Right breast IDC, gr 3, triple negative, cT1c cN0 cM0, stage IA, prognostic stage IB    · 5/12/21 right breast lumpectomy: IDC with medullary features, 1.9 cm, gr 3, 0/1 LN, ER negative, [place] negative, HER 2 negative at IHC 0, pT1c pN0 cM0      We explained to the patient that the goal of systemic adjuvant therapy is to improve the chances for cure and decrease the risk of relapse. We explained why a patient can have microscopic cancer spread now even though physical examination, laboratory studies and imaging studies are negative for cancer. We explained that the same treatments used now as adjuvant or preventive treatments rarely if ever are curative in women who develop metastases. Using eprognosis. org, her 5 year all cause mortality risk is 9-15%; her 10 year all cause mortality risk is 34-43%; her median OS is 12-14 years. An adjuvant discussion is warranted. No falls in past 6 months    She will proceed with surgery. We discussed adjuvant chemotherapy following surgery. We discussed the toxicities of TC chemotherapy (docetaxel 75mg/m2/cyclophosphamide 600 mg/m2 q 3 weeks x 4)  in detail. This chemotherapy frequently causes a low white blood cell count and a small percent of patients require hospital admission for treatment of neutropenic fever. We explained that on occasion we consider the use of growth factors to minimize this risk. We explained to the patient that some side effects, if they occur, only last a few days including nausea, vomiting, stomatitis, arthralgia, myalgia, and allergic reactions to Taxotere.  We told the patient that severe nausea and vomiting were very uncommon and that some side effects, if they occur, will last longer: this includes hair loss, which will be seen in all patients treated with these agents and fatigue, which will be seen in most. The patient was also told that if she is having menstrual function that she could develop chemotherapy-induced amenorrhea and infertility. We also told the patient that there was a very small risk of acute leukemia. We also informed the patient that heart damage is rare with these agents    Discussed IV CMF q 3 weeks x 6.  CMF (methotrexate 40 mg/m2, cyclophosphamide 600 mg/m2, fluorouracil 600 mg/m2).  Side effects discussed including, but not limited to, fatigue, myelosuppression, diarrhea, nausea, vomiting, mouth sores, and possible alopecia, and a possible, but very rare, severe allergic reaction to 5-FU.     I do not feel the efficacy difference between TC and CMF for her is large, in the low single digits. Discussed oral and peripheral cryotherapy (for TC)     Discussed increased risk of death with COVID19 and precautions to take with chemotherapy, I strongly recommend that she get the COVID19 vaccine, she got in on 5/19/21     Discussed cold caps, she declines    After this discussion, she is agreeable to CMF, and has signed informed consent. Will plan on starting on 6/1/521. She has had her port placed with Dr. Rocco Toney    Rx:  Zofran, compazine, emla cream    2. Emotional well being:  She has excellent support and is coping well with her disease    3. Osteoporosis:  On fosamax and vit D 50,000 units weekly    I appreciate the opportunity to participate in Ms. Mayank Bhagat's care. Signed By: Geremias Snyder MD      No orders of the defined types were placed in this encounter.

## 2021-06-01 NOTE — PROGRESS NOTES
Cristofer Lau is a 64 y.o. female here for follow up of breast cancer. 1. Have you been to the ER, urgent care clinic since your last visit? Hospitalized since your last visit?: No.    2. Have you seen or consulted any other health care providers outside of the [address] [place] [address] since your last visit?   Include any pap smears or colon screening.: No.

## 2021-06-03 ENCOUNTER — TELEPHONE (OUTPATIENT)
Dept: ONCOLOGY | Age: 76
End: 2021-06-03

## 2021-06-10 ENCOUNTER — TELEPHONE (OUTPATIENT)
Dept: ONCOLOGY | Age: 76
End: 2021-06-10

## 2021-06-11 RX ORDER — SODIUM CHLORIDE 9 MG/ML
25 INJECTION, SOLUTION INTRAVENOUS CONTINUOUS
Status: CANCELLED | OUTPATIENT
Start: 2021-08-17

## 2021-06-11 RX ORDER — DIPHENHYDRAMINE HYDROCHLORIDE 50 MG/ML
25 INJECTION, SOLUTION INTRAMUSCULAR; INTRAVENOUS AS NEEDED
Status: CANCELLED
Start: 2021-09-28

## 2021-06-11 RX ORDER — PALONOSETRON 0.05 MG/ML
0.25 INJECTION, SOLUTION INTRAVENOUS ONCE
Status: CANCELLED | OUTPATIENT
Start: 2021-08-17 | End: 2021-08-17

## 2021-06-11 RX ORDER — DIPHENHYDRAMINE HYDROCHLORIDE 50 MG/ML
50 INJECTION, SOLUTION INTRAMUSCULAR; INTRAVENOUS AS NEEDED
Status: CANCELLED
Start: 2021-06-15

## 2021-06-11 RX ORDER — ONDANSETRON 2 MG/ML
8 INJECTION INTRAMUSCULAR; INTRAVENOUS AS NEEDED
Status: CANCELLED | OUTPATIENT
Start: 2021-08-17

## 2021-06-11 RX ORDER — HEPARIN 100 UNIT/ML
300-500 SYRINGE INTRAVENOUS AS NEEDED
Status: CANCELLED
Start: 2021-09-28

## 2021-06-11 RX ORDER — DIPHENHYDRAMINE HYDROCHLORIDE 50 MG/ML
25 INJECTION, SOLUTION INTRAMUSCULAR; INTRAVENOUS AS NEEDED
Status: CANCELLED
Start: 2021-07-06

## 2021-06-11 RX ORDER — ONDANSETRON 2 MG/ML
8 INJECTION INTRAMUSCULAR; INTRAVENOUS AS NEEDED
Status: CANCELLED | OUTPATIENT
Start: 2021-07-27

## 2021-06-11 RX ORDER — ACETAMINOPHEN 325 MG/1
650 TABLET ORAL AS NEEDED
Status: CANCELLED
Start: 2021-09-07

## 2021-06-11 RX ORDER — EPINEPHRINE 1 MG/ML
0.3 INJECTION, SOLUTION, CONCENTRATE INTRAVENOUS AS NEEDED
Status: CANCELLED | OUTPATIENT
Start: 2021-07-06

## 2021-06-11 RX ORDER — HYDROCORTISONE SODIUM SUCCINATE 100 MG/2ML
100 INJECTION, POWDER, FOR SOLUTION INTRAMUSCULAR; INTRAVENOUS AS NEEDED
Status: CANCELLED | OUTPATIENT
Start: 2021-09-07

## 2021-06-11 RX ORDER — ALBUTEROL SULFATE 0.83 MG/ML
2.5 SOLUTION RESPIRATORY (INHALATION) AS NEEDED
Status: CANCELLED
Start: 2021-06-15

## 2021-06-11 RX ORDER — HEPARIN 100 UNIT/ML
300-500 SYRINGE INTRAVENOUS AS NEEDED
Status: CANCELLED
Start: 2021-07-06

## 2021-06-11 RX ORDER — SODIUM CHLORIDE 0.9 % (FLUSH) 0.9 %
10 SYRINGE (ML) INJECTION AS NEEDED
Status: CANCELLED | OUTPATIENT
Start: 2021-06-15

## 2021-06-11 RX ORDER — SODIUM CHLORIDE 9 MG/ML
10 INJECTION INTRAMUSCULAR; INTRAVENOUS; SUBCUTANEOUS AS NEEDED
Status: CANCELLED | OUTPATIENT
Start: 2021-09-07

## 2021-06-11 RX ORDER — ONDANSETRON 2 MG/ML
8 INJECTION INTRAMUSCULAR; INTRAVENOUS AS NEEDED
Status: CANCELLED | OUTPATIENT
Start: 2021-09-07

## 2021-06-11 RX ORDER — SODIUM CHLORIDE 0.9 % (FLUSH) 0.9 %
10 SYRINGE (ML) INJECTION AS NEEDED
Status: CANCELLED | OUTPATIENT
Start: 2021-09-28

## 2021-06-11 RX ORDER — PALONOSETRON 0.05 MG/ML
0.25 INJECTION, SOLUTION INTRAVENOUS ONCE
Status: CANCELLED | OUTPATIENT
Start: 2021-06-15 | End: 2021-06-15

## 2021-06-11 RX ORDER — METHOTREXATE 25 MG/ML
40 INJECTION, SOLUTION INTRA-ARTERIAL; INTRAMUSCULAR; INTRAVENOUS ONCE
Status: CANCELLED | OUTPATIENT
Start: 2021-08-17 | End: 2021-08-17

## 2021-06-11 RX ORDER — EPINEPHRINE 1 MG/ML
0.3 INJECTION, SOLUTION, CONCENTRATE INTRAVENOUS AS NEEDED
Status: CANCELLED | OUTPATIENT
Start: 2021-08-17

## 2021-06-11 RX ORDER — SODIUM CHLORIDE 0.9 % (FLUSH) 0.9 %
10 SYRINGE (ML) INJECTION AS NEEDED
Status: CANCELLED | OUTPATIENT
Start: 2021-09-07

## 2021-06-11 RX ORDER — HYDROCORTISONE SODIUM SUCCINATE 100 MG/2ML
100 INJECTION, POWDER, FOR SOLUTION INTRAMUSCULAR; INTRAVENOUS AS NEEDED
Status: CANCELLED | OUTPATIENT
Start: 2021-07-27

## 2021-06-11 RX ORDER — EPINEPHRINE 1 MG/ML
0.3 INJECTION, SOLUTION, CONCENTRATE INTRAVENOUS AS NEEDED
Status: CANCELLED | OUTPATIENT
Start: 2021-07-27

## 2021-06-11 RX ORDER — SODIUM CHLORIDE 9 MG/ML
10 INJECTION INTRAMUSCULAR; INTRAVENOUS; SUBCUTANEOUS AS NEEDED
Status: CANCELLED | OUTPATIENT
Start: 2021-09-28

## 2021-06-11 RX ORDER — ALBUTEROL SULFATE 0.83 MG/ML
2.5 SOLUTION RESPIRATORY (INHALATION) AS NEEDED
Status: CANCELLED
Start: 2021-09-07

## 2021-06-11 RX ORDER — METHOTREXATE 25 MG/ML
40 INJECTION, SOLUTION INTRA-ARTERIAL; INTRAMUSCULAR; INTRAVENOUS ONCE
Status: CANCELLED | OUTPATIENT
Start: 2021-09-28 | End: 2021-09-28

## 2021-06-11 RX ORDER — FLUOROURACIL 50 MG/ML
1000 INJECTION, SOLUTION INTRAVENOUS ONCE
Status: CANCELLED | OUTPATIENT
Start: 2021-06-15 | End: 2021-06-15

## 2021-06-11 RX ORDER — ONDANSETRON 2 MG/ML
8 INJECTION INTRAMUSCULAR; INTRAVENOUS AS NEEDED
Status: CANCELLED | OUTPATIENT
Start: 2021-07-06

## 2021-06-11 RX ORDER — HEPARIN 100 UNIT/ML
300-500 SYRINGE INTRAVENOUS AS NEEDED
Status: CANCELLED
Start: 2021-09-07

## 2021-06-11 RX ORDER — HEPARIN 100 UNIT/ML
300-500 SYRINGE INTRAVENOUS AS NEEDED
Status: CANCELLED
Start: 2021-07-27

## 2021-06-11 RX ORDER — EPINEPHRINE 1 MG/ML
0.3 INJECTION, SOLUTION, CONCENTRATE INTRAVENOUS AS NEEDED
Status: CANCELLED | OUTPATIENT
Start: 2021-06-15

## 2021-06-11 RX ORDER — SODIUM CHLORIDE 9 MG/ML
25 INJECTION, SOLUTION INTRAVENOUS CONTINUOUS
Status: CANCELLED | OUTPATIENT
Start: 2021-09-07

## 2021-06-11 RX ORDER — ALBUTEROL SULFATE 0.83 MG/ML
2.5 SOLUTION RESPIRATORY (INHALATION) AS NEEDED
Status: CANCELLED
Start: 2021-09-28

## 2021-06-11 RX ORDER — SODIUM CHLORIDE 9 MG/ML
25 INJECTION, SOLUTION INTRAVENOUS CONTINUOUS
Status: CANCELLED | OUTPATIENT
Start: 2021-09-28

## 2021-06-11 RX ORDER — DIPHENHYDRAMINE HYDROCHLORIDE 50 MG/ML
25 INJECTION, SOLUTION INTRAMUSCULAR; INTRAVENOUS AS NEEDED
Status: CANCELLED
Start: 2021-07-27

## 2021-06-11 RX ORDER — SODIUM CHLORIDE 0.9 % (FLUSH) 0.9 %
10 SYRINGE (ML) INJECTION AS NEEDED
Status: CANCELLED | OUTPATIENT
Start: 2021-07-27

## 2021-06-11 RX ORDER — ALBUTEROL SULFATE 0.83 MG/ML
2.5 SOLUTION RESPIRATORY (INHALATION) AS NEEDED
Status: CANCELLED
Start: 2021-07-27

## 2021-06-11 RX ORDER — HEPARIN 100 UNIT/ML
300-500 SYRINGE INTRAVENOUS AS NEEDED
Status: CANCELLED
Start: 2021-06-15

## 2021-06-11 RX ORDER — SODIUM CHLORIDE 0.9 % (FLUSH) 0.9 %
10 SYRINGE (ML) INJECTION AS NEEDED
Status: CANCELLED | OUTPATIENT
Start: 2021-08-17

## 2021-06-11 RX ORDER — DIPHENHYDRAMINE HYDROCHLORIDE 50 MG/ML
50 INJECTION, SOLUTION INTRAMUSCULAR; INTRAVENOUS AS NEEDED
Status: CANCELLED
Start: 2021-08-17

## 2021-06-11 RX ORDER — FLUOROURACIL 50 MG/ML
1000 INJECTION, SOLUTION INTRAVENOUS ONCE
Status: CANCELLED | OUTPATIENT
Start: 2021-07-06 | End: 2021-07-06

## 2021-06-11 RX ORDER — METHOTREXATE 25 MG/ML
40 INJECTION, SOLUTION INTRA-ARTERIAL; INTRAMUSCULAR; INTRAVENOUS ONCE
Status: CANCELLED | OUTPATIENT
Start: 2021-09-07 | End: 2021-09-07

## 2021-06-11 RX ORDER — DIPHENHYDRAMINE HYDROCHLORIDE 50 MG/ML
25 INJECTION, SOLUTION INTRAMUSCULAR; INTRAVENOUS AS NEEDED
Status: CANCELLED
Start: 2021-06-15

## 2021-06-11 RX ORDER — SODIUM CHLORIDE 9 MG/ML
10 INJECTION INTRAMUSCULAR; INTRAVENOUS; SUBCUTANEOUS AS NEEDED
Status: CANCELLED | OUTPATIENT
Start: 2021-08-17

## 2021-06-11 RX ORDER — DIPHENHYDRAMINE HYDROCHLORIDE 50 MG/ML
50 INJECTION, SOLUTION INTRAMUSCULAR; INTRAVENOUS AS NEEDED
Status: CANCELLED
Start: 2021-07-06

## 2021-06-11 RX ORDER — EPINEPHRINE 1 MG/ML
0.3 INJECTION, SOLUTION, CONCENTRATE INTRAVENOUS AS NEEDED
Status: CANCELLED | OUTPATIENT
Start: 2021-09-28

## 2021-06-11 RX ORDER — DIPHENHYDRAMINE HYDROCHLORIDE 50 MG/ML
25 INJECTION, SOLUTION INTRAMUSCULAR; INTRAVENOUS AS NEEDED
Status: CANCELLED
Start: 2021-08-17

## 2021-06-11 RX ORDER — SODIUM CHLORIDE 9 MG/ML
10 INJECTION INTRAMUSCULAR; INTRAVENOUS; SUBCUTANEOUS AS NEEDED
Status: CANCELLED | OUTPATIENT
Start: 2021-06-15

## 2021-06-11 RX ORDER — SODIUM CHLORIDE 0.9 % (FLUSH) 0.9 %
10 SYRINGE (ML) INJECTION AS NEEDED
Status: CANCELLED | OUTPATIENT
Start: 2021-07-06

## 2021-06-11 RX ORDER — ALBUTEROL SULFATE 0.83 MG/ML
2.5 SOLUTION RESPIRATORY (INHALATION) AS NEEDED
Status: CANCELLED
Start: 2021-08-17

## 2021-06-11 RX ORDER — ACETAMINOPHEN 325 MG/1
650 TABLET ORAL AS NEEDED
Status: CANCELLED
Start: 2021-09-28

## 2021-06-11 RX ORDER — DIPHENHYDRAMINE HYDROCHLORIDE 50 MG/ML
25 INJECTION, SOLUTION INTRAMUSCULAR; INTRAVENOUS AS NEEDED
Status: CANCELLED
Start: 2021-09-07

## 2021-06-11 RX ORDER — EPINEPHRINE 1 MG/ML
0.3 INJECTION, SOLUTION, CONCENTRATE INTRAVENOUS AS NEEDED
Status: CANCELLED | OUTPATIENT
Start: 2021-09-07

## 2021-06-11 RX ORDER — FLUOROURACIL 50 MG/ML
1000 INJECTION, SOLUTION INTRAVENOUS ONCE
Status: CANCELLED | OUTPATIENT
Start: 2021-07-27 | End: 2021-07-27

## 2021-06-11 RX ORDER — METHOTREXATE 25 MG/ML
40 INJECTION, SOLUTION INTRA-ARTERIAL; INTRAMUSCULAR; INTRAVENOUS ONCE
Status: CANCELLED | OUTPATIENT
Start: 2021-06-15 | End: 2021-06-15

## 2021-06-11 RX ORDER — SODIUM CHLORIDE 9 MG/ML
25 INJECTION, SOLUTION INTRAVENOUS CONTINUOUS
Status: CANCELLED | OUTPATIENT
Start: 2021-07-27

## 2021-06-11 RX ORDER — HYDROCORTISONE SODIUM SUCCINATE 100 MG/2ML
100 INJECTION, POWDER, FOR SOLUTION INTRAMUSCULAR; INTRAVENOUS AS NEEDED
Status: CANCELLED | OUTPATIENT
Start: 2021-06-15

## 2021-06-11 RX ORDER — METHOTREXATE 25 MG/ML
40 INJECTION, SOLUTION INTRA-ARTERIAL; INTRAMUSCULAR; INTRAVENOUS ONCE
Status: CANCELLED | OUTPATIENT
Start: 2021-07-06 | End: 2021-07-06

## 2021-06-11 RX ORDER — SODIUM CHLORIDE 9 MG/ML
25 INJECTION, SOLUTION INTRAVENOUS CONTINUOUS
Status: CANCELLED | OUTPATIENT
Start: 2021-07-06

## 2021-06-11 RX ORDER — SODIUM CHLORIDE 9 MG/ML
10 INJECTION INTRAMUSCULAR; INTRAVENOUS; SUBCUTANEOUS AS NEEDED
Status: CANCELLED | OUTPATIENT
Start: 2021-07-06

## 2021-06-11 RX ORDER — HEPARIN 100 UNIT/ML
300-500 SYRINGE INTRAVENOUS AS NEEDED
Status: CANCELLED
Start: 2021-08-17

## 2021-06-11 RX ORDER — SODIUM CHLORIDE 9 MG/ML
25 INJECTION, SOLUTION INTRAVENOUS CONTINUOUS
Status: CANCELLED | OUTPATIENT
Start: 2021-06-15

## 2021-06-11 RX ORDER — ACETAMINOPHEN 325 MG/1
650 TABLET ORAL AS NEEDED
Status: CANCELLED
Start: 2021-07-06

## 2021-06-11 RX ORDER — SODIUM CHLORIDE 9 MG/ML
10 INJECTION INTRAMUSCULAR; INTRAVENOUS; SUBCUTANEOUS AS NEEDED
Status: CANCELLED | OUTPATIENT
Start: 2021-07-27

## 2021-06-11 RX ORDER — HYDROCORTISONE SODIUM SUCCINATE 100 MG/2ML
100 INJECTION, POWDER, FOR SOLUTION INTRAMUSCULAR; INTRAVENOUS AS NEEDED
Status: CANCELLED | OUTPATIENT
Start: 2021-08-17

## 2021-06-11 RX ORDER — DIPHENHYDRAMINE HYDROCHLORIDE 50 MG/ML
50 INJECTION, SOLUTION INTRAMUSCULAR; INTRAVENOUS AS NEEDED
Status: CANCELLED
Start: 2021-09-07

## 2021-06-11 RX ORDER — PALONOSETRON 0.05 MG/ML
0.25 INJECTION, SOLUTION INTRAVENOUS ONCE
Status: CANCELLED | OUTPATIENT
Start: 2021-09-28 | End: 2021-09-28

## 2021-06-11 RX ORDER — DIPHENHYDRAMINE HYDROCHLORIDE 50 MG/ML
50 INJECTION, SOLUTION INTRAMUSCULAR; INTRAVENOUS AS NEEDED
Status: CANCELLED
Start: 2021-07-27

## 2021-06-11 RX ORDER — DIPHENHYDRAMINE HYDROCHLORIDE 50 MG/ML
50 INJECTION, SOLUTION INTRAMUSCULAR; INTRAVENOUS AS NEEDED
Status: CANCELLED
Start: 2021-09-28

## 2021-06-11 RX ORDER — ONDANSETRON 2 MG/ML
8 INJECTION INTRAMUSCULAR; INTRAVENOUS AS NEEDED
Status: CANCELLED | OUTPATIENT
Start: 2021-06-15

## 2021-06-11 RX ORDER — METHOTREXATE 25 MG/ML
40 INJECTION, SOLUTION INTRA-ARTERIAL; INTRAMUSCULAR; INTRAVENOUS ONCE
Status: CANCELLED | OUTPATIENT
Start: 2021-07-27 | End: 2021-07-27

## 2021-06-11 RX ORDER — PALONOSETRON 0.05 MG/ML
0.25 INJECTION, SOLUTION INTRAVENOUS ONCE
Status: CANCELLED | OUTPATIENT
Start: 2021-09-07 | End: 2021-09-07

## 2021-06-11 RX ORDER — FLUOROURACIL 50 MG/ML
1000 INJECTION, SOLUTION INTRAVENOUS ONCE
Status: CANCELLED | OUTPATIENT
Start: 2021-08-17 | End: 2021-08-17

## 2021-06-11 RX ORDER — FLUOROURACIL 50 MG/ML
1000 INJECTION, SOLUTION INTRAVENOUS ONCE
Status: CANCELLED | OUTPATIENT
Start: 2021-09-07 | End: 2021-09-07

## 2021-06-11 RX ORDER — ACETAMINOPHEN 325 MG/1
650 TABLET ORAL AS NEEDED
Status: CANCELLED
Start: 2021-07-27

## 2021-06-11 RX ORDER — PALONOSETRON 0.05 MG/ML
0.25 INJECTION, SOLUTION INTRAVENOUS ONCE
Status: CANCELLED | OUTPATIENT
Start: 2021-07-06 | End: 2021-07-06

## 2021-06-11 RX ORDER — ACETAMINOPHEN 325 MG/1
650 TABLET ORAL AS NEEDED
Status: CANCELLED
Start: 2021-06-15

## 2021-06-11 RX ORDER — ONDANSETRON 2 MG/ML
8 INJECTION INTRAMUSCULAR; INTRAVENOUS AS NEEDED
Status: CANCELLED | OUTPATIENT
Start: 2021-09-28

## 2021-06-11 RX ORDER — ALBUTEROL SULFATE 0.83 MG/ML
2.5 SOLUTION RESPIRATORY (INHALATION) AS NEEDED
Status: CANCELLED
Start: 2021-07-06

## 2021-06-11 RX ORDER — FLUOROURACIL 50 MG/ML
1000 INJECTION, SOLUTION INTRAVENOUS ONCE
Status: CANCELLED | OUTPATIENT
Start: 2021-09-28 | End: 2021-09-28

## 2021-06-11 RX ORDER — PALONOSETRON 0.05 MG/ML
0.25 INJECTION, SOLUTION INTRAVENOUS ONCE
Status: CANCELLED | OUTPATIENT
Start: 2021-07-27 | End: 2021-07-27

## 2021-06-11 RX ORDER — HYDROCORTISONE SODIUM SUCCINATE 100 MG/2ML
100 INJECTION, POWDER, FOR SOLUTION INTRAMUSCULAR; INTRAVENOUS AS NEEDED
Status: CANCELLED | OUTPATIENT
Start: 2021-09-28

## 2021-06-11 RX ORDER — HYDROCORTISONE SODIUM SUCCINATE 100 MG/2ML
100 INJECTION, POWDER, FOR SOLUTION INTRAMUSCULAR; INTRAVENOUS AS NEEDED
Status: CANCELLED | OUTPATIENT
Start: 2021-07-06

## 2021-06-11 RX ORDER — ACETAMINOPHEN 325 MG/1
650 TABLET ORAL AS NEEDED
Status: CANCELLED
Start: 2021-08-17

## 2021-06-14 ENCOUNTER — OFFICE VISIT (OUTPATIENT)
Dept: CARDIOLOGY CLINIC | Age: 76
End: 2021-06-14
Payer: MEDICARE

## 2021-06-14 VITALS
SYSTOLIC BLOOD PRESSURE: 122 MMHG | BODY MASS INDEX: 24.93 KG/M2 | HEART RATE: 63 BPM | OXYGEN SATURATION: 96 % | WEIGHT: 149.6 LBS | HEIGHT: 65 IN | DIASTOLIC BLOOD PRESSURE: 68 MMHG

## 2021-06-14 DIAGNOSIS — R00.2 PALPITATIONS: Primary | ICD-10-CM

## 2021-06-14 DIAGNOSIS — I44.1 2ND DEGREE AV BLOCK: ICD-10-CM

## 2021-06-14 DIAGNOSIS — I45.5 SINUS PAUSE: ICD-10-CM

## 2021-06-14 DIAGNOSIS — I47.29 NSVT (NONSUSTAINED VENTRICULAR TACHYCARDIA): ICD-10-CM

## 2021-06-14 PROCEDURE — G8420 CALC BMI NORM PARAMETERS: HCPCS | Performed by: INTERNAL MEDICINE

## 2021-06-14 PROCEDURE — G8399 PT W/DXA RESULTS DOCUMENT: HCPCS | Performed by: INTERNAL MEDICINE

## 2021-06-14 PROCEDURE — G8536 NO DOC ELDER MAL SCRN: HCPCS | Performed by: INTERNAL MEDICINE

## 2021-06-14 PROCEDURE — 1101F PT FALLS ASSESS-DOCD LE1/YR: CPT | Performed by: INTERNAL MEDICINE

## 2021-06-14 PROCEDURE — 1090F PRES/ABSN URINE INCON ASSESS: CPT | Performed by: INTERNAL MEDICINE

## 2021-06-14 PROCEDURE — G8427 DOCREV CUR MEDS BY ELIG CLIN: HCPCS | Performed by: INTERNAL MEDICINE

## 2021-06-14 PROCEDURE — G0463 HOSPITAL OUTPT CLINIC VISIT: HCPCS | Performed by: INTERNAL MEDICINE

## 2021-06-14 PROCEDURE — G8432 DEP SCR NOT DOC, RNG: HCPCS | Performed by: INTERNAL MEDICINE

## 2021-06-14 PROCEDURE — 99214 OFFICE O/P EST MOD 30 MIN: CPT | Performed by: INTERNAL MEDICINE

## 2021-06-14 NOTE — PROGRESS NOTES
Willian Camacho MD., [facility]    [address] [place], [address] [facility]    Office [phone],[facility] [phone]           Jaclyn Bergman is here for a f/u office visit. Primary care physician:  Dakota Milton MD    CC - as documented in EMR    Dear Dr. Dakota Milton MD    I had the pleasure of seeing Ms. Jaclyn Bergman in the office today. Assessment:     Arrhythmia - 8 second pause suring procedure ( anesthesia/L Port placement) 5/12/21  Mobitz 2 type I; 1st deg AV block  Breast CA- S/p breast biopsy and port placement ; S/p Breat lumpectomy  Event monitor-5/12/2021-6/10/2021-PAC burden 3%, PVC burden 10%. 9 times NSVT ( longest 9 beat run)      Plan:    Nml EF on echo  April nuclear study. Not on AV cande blocking agents. TSH 5.62 ( mildly elevated) 5/12/21 - Will recheck. If abnormal will need to f/up with PCP  Aggressive cardiovascular risk factor modification. Avoid QT prolonging drugs. Follow-up in12 weeks/earlier as needed      Patient understands the plan. All questions were answered to the patient's satisfaction. I appreciate the opportunity to be involved in Ms. Junior. See note below for details. Please do not hesitate to contact us with questions or concerns. Willian Camacho MD      Cardiac Testing/ Procedures: A. Cardiac Cath/PCI:    B.ECHO/JOSELINE: 5/12/21 - Nml EF/Mild MR    C. StressNuclear/Stress ECHO/Stress test:    D.Vascular:    E. EP: Event monitor 5/12/2021-6/10/2021-patient monitored for 28 days 10/60 minutes  50 events were transmitted-1 symptomatic, 49 device triggered. SVT occurred 1 time with fastest of 107 bpm.  NSVT occurred 9 times with fastest run of 187 bpm.  Heart block occurred 2 times-first-degree AV block slowest heartbeat 60 bpm.  PAC burden-3%, PVC burden 10%. No A. fib. No pauses. F. Miscellaneous:    History:     Jaclyn Bergman is a 64 y.o. female who returns for follow up visit.     No CP/dyspnea/swelling LE/palpitaitons    Starting chemo this week. ROS:  (bold if positive, if negative)           Medications:       Current Outpatient Medications   Medication Sig Dispense    lidocaine-prilocaine (EMLA) topical cream Apply  to affected area as needed for Pain. 30 g    prochlorperazine (COMPAZINE) 10 mg tablet Take 1 Tablet by mouth every six (6) hours as needed for Nausea. 50 Tablet    ondansetron hcl (ZOFRAN) 8 mg tablet Take 1 Tablet by mouth every eight (8) hours as needed for Nausea. 24 Tablet    escitalopram oxalate (LEXAPRO) 10 mg tablet Take 10 mg by mouth daily. Indications: anxiousness associated with depression     alendronate (FOSAMAX) 35 mg tablet Take 70 mg by mouth every seven (7) days. mondays     timoloL (BETIMOL) 0.25 % ophthalmic solution Administer 1-2 Drops to both eyes two (2) times a day. use in affected eye(s)     latanoprost (XALATAN) 0.005 % ophthalmic solution Administer 1 Drop to both eyes daily.  diclofenac EC (VOLTAREN) 75 mg EC tablet TAKE ONE TABLET BY MOUTH TWICE DAILY 60 Tab     No current facility-administered medications for this visit. Family History of CAD:    No    Social History:  Current  Smoker  No    Physical Exam:     Visit Vitals  /68 (BP 1 Location: Left upper arm, BP Patient Position: Sitting)   Pulse 63   Ht 5' 5\" (1.651 m)   Wt 149 lb 9.6 oz (67.9 kg)   LMP 11/15/1990 (Exact Date)   SpO2 96%   BMI 24.89 kg/m²          Gen: Well-developed, well-nourished, in no acute distress  Neck: Supple,No JVD, No Carotid Bruit,   Resp: No accessory muscle use, Clear breath sounds, No rales or rhonchi  Card: Regular Rate,Rythm,Normal S1, S2, No murmurs, rubs or gallop. No thrills.    Abd:  Soft, non-tender, non-distended,BS+,   MSK: No cyanosis  Skin: No rashes    Neuro: moving all four extremities , follows commands appropriately  Psych:  Good insight, oriented to person, place , alert, Nml Affect  LE: No edema    EKG:       Medication Side Effects and Warnings were discussed with patient: yes  Patient Labs were reviewed and/or requested:  yes  Patient Past Records were reviewed and/or requested: yes    Total time :        mins    ATTENTION:   This medical record was transcribed using an electronic medical records/speech recognition system. Although proofread, it may and can contain electronic, spelling and other errors. Corrections may be executed at a later time. Please feel free to contact us for any clarifications as needed.       Jhony Holt MD

## 2021-06-14 NOTE — PROGRESS NOTES
Chief Complaint   Patient presents with   [facility] Follow Up     Visit Vitals  /68 (BP 1 Location: Left upper arm, BP Patient Position: Sitting)   Pulse 63   Ht 5' 5\" (1.651 m)   Wt 149 lb 9.6 oz (67.9 kg)   SpO2 96%   BMI 24.89 kg/m²       Chest pain denied  SOB - denied  Dizziness denied  Swelling/Edema - denied

## 2021-06-15 ENCOUNTER — HOSPITAL ENCOUNTER (OUTPATIENT)
Dept: INFUSION THERAPY | Age: 76
Discharge: HOME | End: 2021-06-15
Payer: MEDICARE

## 2021-06-15 ENCOUNTER — OFFICE VISIT (OUTPATIENT)
Dept: ONCOLOGY | Age: 76
End: 2021-06-15
Payer: MEDICARE

## 2021-06-15 VITALS
HEART RATE: 59 BPM | RESPIRATION RATE: 18 BRPM | BODY MASS INDEX: 24.99 KG/M2 | TEMPERATURE: 95.9 F | OXYGEN SATURATION: 97 % | DIASTOLIC BLOOD PRESSURE: 62 MMHG | HEIGHT: 65 IN | SYSTOLIC BLOOD PRESSURE: 135 MMHG | WEIGHT: 150 LBS

## 2021-06-15 VITALS
TEMPERATURE: 95.9 F | DIASTOLIC BLOOD PRESSURE: 57 MMHG | WEIGHT: 150 LBS | SYSTOLIC BLOOD PRESSURE: 119 MMHG | OXYGEN SATURATION: 97 % | HEART RATE: 66 BPM | RESPIRATION RATE: 18 BRPM | BODY MASS INDEX: 24.99 KG/M2 | HEIGHT: 65 IN

## 2021-06-15 DIAGNOSIS — Z17.1 MALIGNANT NEOPLASM OF UPPER-OUTER QUADRANT OF RIGHT BREAST IN FEMALE, ESTROGEN RECEPTOR NEGATIVE (HCC): ICD-10-CM

## 2021-06-15 DIAGNOSIS — C50.411 MALIGNANT NEOPLASM OF UPPER-OUTER QUADRANT OF RIGHT BREAST IN FEMALE, ESTROGEN RECEPTOR NEGATIVE (HCC): Primary | ICD-10-CM

## 2021-06-15 DIAGNOSIS — Z17.1 MALIGNANT NEOPLASM OF UPPER-OUTER QUADRANT OF RIGHT BREAST IN FEMALE, ESTROGEN RECEPTOR NEGATIVE (HCC): Primary | ICD-10-CM

## 2021-06-15 DIAGNOSIS — C50.411 MALIGNANT NEOPLASM OF UPPER-OUTER QUADRANT OF RIGHT BREAST IN FEMALE, ESTROGEN RECEPTOR NEGATIVE (HCC): ICD-10-CM

## 2021-06-15 LAB
ALBUMIN SERPL-MCNC: 3.8 G/DL (ref 3.5–5)
ALBUMIN/GLOB SERPL: 1.5 {RATIO} (ref 1.1–2.2)
ALP SERPL-CCNC: 91 U/L (ref 45–117)
ALT SERPL-CCNC: 25 U/L (ref 12–78)
ANION GAP SERPL CALC-SCNC: 3 MMOL/L (ref 5–15)
AST SERPL-CCNC: 24 U/L (ref 15–37)
BASOPHILS # BLD: 0 K/UL (ref 0–0.1)
BASOPHILS NFR BLD: 1 % (ref 0–1)
BILIRUB SERPL-MCNC: 0.5 MG/DL (ref 0.2–1)
BUN SERPL-MCNC: 15 MG/DL (ref 6–20)
BUN/CREAT SERPL: 23 (ref 12–20)
CALCIUM SERPL-MCNC: 8.7 MG/DL (ref 8.5–10.1)
CHLORIDE SERPL-SCNC: 106 MMOL/L (ref 97–108)
CO2 SERPL-SCNC: 29 MMOL/L (ref 21–32)
CREAT SERPL-MCNC: 0.66 MG/DL (ref 0.55–1.02)
DIFFERENTIAL METHOD BLD: NORMAL
EOSINOPHIL # BLD: 0.3 K/UL (ref 0–0.4)
EOSINOPHIL NFR BLD: 6 % (ref 0–7)
ERYTHROCYTE [DISTWIDTH] IN BLOOD BY AUTOMATED COUNT: 13.9 % (ref 11.5–14.5)
GLOBULIN SER CALC-MCNC: 2.6 G/DL (ref 2–4)
GLUCOSE SERPL-MCNC: 66 MG/DL (ref 65–100)
HCT VFR BLD AUTO: 37.6 % (ref 35–47)
HGB BLD-MCNC: 11.7 G/DL (ref 11.5–16)
IMM GRANULOCYTES # BLD AUTO: 0 K/UL (ref 0–0.04)
IMM GRANULOCYTES NFR BLD AUTO: 0 % (ref 0–0.5)
LYMPHOCYTES # BLD: 1.5 K/UL (ref 0.8–3.5)
LYMPHOCYTES NFR BLD: 31 % (ref 12–49)
MCH RBC QN AUTO: 30.2 PG (ref 26–34)
MCHC RBC AUTO-ENTMCNC: 31.1 G/DL (ref 30–36.5)
MCV RBC AUTO: 97.2 FL (ref 80–99)
MONOCYTES # BLD: 0.7 K/UL (ref 0–1)
MONOCYTES NFR BLD: 13 % (ref 5–13)
NEUTS SEG # BLD: 2.5 K/UL (ref 1.8–8)
NEUTS SEG NFR BLD: 49 % (ref 32–75)
NRBC # BLD: 0 K/UL (ref 0–0.01)
NRBC BLD-RTO: 0 PER 100 WBC
PLATELET # BLD AUTO: 219 K/UL (ref 150–400)
PMV BLD AUTO: 9.9 FL (ref 8.9–12.9)
POTASSIUM SERPL-SCNC: 4 MMOL/L (ref 3.5–5.1)
PROT SERPL-MCNC: 6.4 G/DL (ref 6.4–8.2)
RBC # BLD AUTO: 3.87 M/UL (ref 3.8–5.2)
SODIUM SERPL-SCNC: 138 MMOL/L (ref 136–145)
WBC # BLD AUTO: 5 K/UL (ref 3.6–11)

## 2021-06-15 PROCEDURE — G8420 CALC BMI NORM PARAMETERS: HCPCS | Performed by: INTERNAL MEDICINE

## 2021-06-15 PROCEDURE — G8399 PT W/DXA RESULTS DOCUMENT: HCPCS | Performed by: INTERNAL MEDICINE

## 2021-06-15 PROCEDURE — 96411 CHEMO IV PUSH ADDL DRUG: CPT

## 2021-06-15 PROCEDURE — 1090F PRES/ABSN URINE INCON ASSESS: CPT | Performed by: INTERNAL MEDICINE

## 2021-06-15 PROCEDURE — 99215 OFFICE O/P EST HI 40 MIN: CPT | Performed by: INTERNAL MEDICINE

## 2021-06-15 PROCEDURE — G8510 SCR DEP NEG, NO PLAN REQD: HCPCS | Performed by: INTERNAL MEDICINE

## 2021-06-15 PROCEDURE — 36415 COLL VENOUS BLD VENIPUNCTURE: CPT

## 2021-06-15 PROCEDURE — 85025 COMPLETE CBC W/AUTO DIFF WBC: CPT

## 2021-06-15 PROCEDURE — 74011250636 HC RX REV CODE- 250/636: Performed by: INTERNAL MEDICINE

## 2021-06-15 PROCEDURE — 1101F PT FALLS ASSESS-DOCD LE1/YR: CPT | Performed by: INTERNAL MEDICINE

## 2021-06-15 PROCEDURE — 96413 CHEMO IV INFUSION 1 HR: CPT

## 2021-06-15 PROCEDURE — 96367 TX/PROPH/DG ADDL SEQ IV INF: CPT

## 2021-06-15 PROCEDURE — 80053 COMPREHEN METABOLIC PANEL: CPT

## 2021-06-15 PROCEDURE — G0463 HOSPITAL OUTPT CLINIC VISIT: HCPCS | Performed by: INTERNAL MEDICINE

## 2021-06-15 PROCEDURE — G8427 DOCREV CUR MEDS BY ELIG CLIN: HCPCS | Performed by: INTERNAL MEDICINE

## 2021-06-15 PROCEDURE — 74011000258 HC RX REV CODE- 258: Performed by: INTERNAL MEDICINE

## 2021-06-15 PROCEDURE — G8536 NO DOC ELDER MAL SCRN: HCPCS | Performed by: INTERNAL MEDICINE

## 2021-06-15 PROCEDURE — 96375 TX/PRO/DX INJ NEW DRUG ADDON: CPT

## 2021-06-15 PROCEDURE — 77030012965 HC NDL HUBR BBMI -A

## 2021-06-15 RX ORDER — ACETAMINOPHEN 325 MG/1
650 TABLET ORAL AS NEEDED
Status: ACTIVE | OUTPATIENT
Start: 2021-06-15 | End: 2021-06-15

## 2021-06-15 RX ORDER — HEPARIN 100 UNIT/ML
300-500 SYRINGE INTRAVENOUS AS NEEDED
Status: ACTIVE | OUTPATIENT
Start: 2021-06-15 | End: 2021-06-15

## 2021-06-15 RX ORDER — PALONOSETRON 0.05 MG/ML
0.25 INJECTION, SOLUTION INTRAVENOUS ONCE
Status: COMPLETED | OUTPATIENT
Start: 2021-06-15 | End: 2021-06-15

## 2021-06-15 RX ORDER — DIPHENHYDRAMINE HYDROCHLORIDE 50 MG/ML
25 INJECTION, SOLUTION INTRAMUSCULAR; INTRAVENOUS AS NEEDED
Status: ACTIVE | OUTPATIENT
Start: 2021-06-15 | End: 2021-06-15

## 2021-06-15 RX ORDER — SODIUM CHLORIDE 0.9 % (FLUSH) 0.9 %
10 SYRINGE (ML) INJECTION AS NEEDED
Status: DISPENSED | OUTPATIENT
Start: 2021-06-15 | End: 2021-06-15

## 2021-06-15 RX ORDER — ONDANSETRON 2 MG/ML
8 INJECTION INTRAMUSCULAR; INTRAVENOUS AS NEEDED
Status: ACTIVE | OUTPATIENT
Start: 2021-06-15 | End: 2021-06-15

## 2021-06-15 RX ORDER — METHOTREXATE 25 MG/ML
40 INJECTION, SOLUTION INTRA-ARTERIAL; INTRAMUSCULAR; INTRAVENOUS ONCE
Status: COMPLETED | OUTPATIENT
Start: 2021-06-15 | End: 2021-06-15

## 2021-06-15 RX ORDER — SODIUM CHLORIDE 9 MG/ML
25 INJECTION, SOLUTION INTRAVENOUS CONTINUOUS
Status: DISPENSED | OUTPATIENT
Start: 2021-06-15 | End: 2021-06-15

## 2021-06-15 RX ORDER — SODIUM CHLORIDE 9 MG/ML
10 INJECTION INTRAMUSCULAR; INTRAVENOUS; SUBCUTANEOUS AS NEEDED
Status: ACTIVE | OUTPATIENT
Start: 2021-06-15 | End: 2021-06-15

## 2021-06-15 RX ORDER — FLUOROURACIL 50 MG/ML
1000 INJECTION, SOLUTION INTRAVENOUS ONCE
Status: COMPLETED | OUTPATIENT
Start: 2021-06-15 | End: 2021-06-15

## 2021-06-15 RX ADMIN — DEXAMETHASONE SODIUM PHOSPHATE 12 MG: 10 INJECTION, SOLUTION INTRAMUSCULAR; INTRAVENOUS at 09:50

## 2021-06-15 RX ADMIN — Medication 10 ML: at 07:57

## 2021-06-15 RX ADMIN — PALONOSETRON 0.25 MG: 0.05 INJECTION, SOLUTION INTRAVENOUS at 09:46

## 2021-06-15 RX ADMIN — METHOTREXATE 70 MG: 25 INJECTION, SOLUTION INTRA-ARTERIAL; INTRAMUSCULAR; INTRAVENOUS at 10:45

## 2021-06-15 RX ADMIN — SODIUM CHLORIDE 10 ML: 9 INJECTION INTRAMUSCULAR; INTRAVENOUS; SUBCUTANEOUS at 12:21

## 2021-06-15 RX ADMIN — SODIUM CHLORIDE 25 ML/HR: 9 INJECTION, SOLUTION INTRAVENOUS at 09:42

## 2021-06-15 RX ADMIN — FLUOROURACIL 1000 MG: 50 INJECTION, SOLUTION INTRAVENOUS at 12:15

## 2021-06-15 RX ADMIN — FOSAPREPITANT 150 MG: 150 INJECTION, POWDER, LYOPHILIZED, FOR SOLUTION INTRAVENOUS at 09:48

## 2021-06-15 RX ADMIN — CYCLOPHOSPHAMIDE 1000 MG: 500 INJECTION, POWDER, FOR SOLUTION INTRAVENOUS; ORAL at 11:03

## 2021-06-15 RX ADMIN — Medication 500 UNITS: at 12:21

## 2021-06-15 NOTE — PROGRESS NOTES
Cristofer Lau is a 64 y.o. female Follow up for the evaluation of breast cancer. 1. Have you been to the ER, urgent care clinic since your last visit? Hospitalized since your last visit? No    2. Have you seen or consulted any other health care providers outside of the [address] [place] [address] since your last visit? Include any pap smears or colon screening.  No

## 2021-06-15 NOTE — PROGRESS NOTES
[facility] VISIT NOTE    [id number]. Pt arrived at [facility] ambulatory and in no distress for C1D1 CMF. Assessment completed, pt voiced no acute complaints or concerns voiced. LCW chest port accessed with . 75 in zhang with no difficulty. Positive blood return noted and labs drawn. Pt proceeded to MD marie. Labs resulted and are within parameters to treat. Medications received:  NS KVO  Aloxi IV  Dexamethasone IV  Emend IV  Methotrexate IVP - over 7 min  Cytoxan IV  Flourouracil IVP- over 5 min    Tolerated treatment well, no adverse reaction noted. Port de-accessed and flushed per protocol. Positive blood return noted. Patient Vitals for the past 12 hrs:   Temp Pulse Resp BP SpO2   06/15/21 1222  66  (!) 119/57    06/15/21 0736 (!) 95.9 °F (35.5 °C) (!) 59 18 135/62 97 %     Recent Results (from the past 12 hour(s))   CBC WITH AUTOMATED DIFF    Collection Time: 06/15/21  7:54 AM   Result Value Ref Range    WBC 5.0 3.6 - 11.0 K/uL    RBC 3.87 3.80 - 5.20 M/uL    HGB 11.7 11.5 - 16.0 g/dL    HCT 37.6 35.0 - 47.0 %    MCV 97.2 80.0 - 99.0 FL    MCH 30.2 26.0 - 34.0 PG    MCHC 31.1 30.0 - 36.5 g/dL    RDW 13.9 11.5 - 14.5 %    PLATELET 568 773 - 259 K/uL    MPV 9.9 8.9 - 12.9 FL    NRBC 0.0 0  WBC    ABSOLUTE NRBC 0.00 0.00 - 0.01 K/uL    NEUTROPHILS 49 32 - 75 %    LYMPHOCYTES 31 12 - 49 %    MONOCYTES 13 5 - 13 %    EOSINOPHILS 6 0 - 7 %    BASOPHILS 1 0 - 1 %    IMMATURE GRANULOCYTES 0 0.0 - 0.5 %    ABS. NEUTROPHILS 2.5 1.8 - 8.0 K/UL    ABS. LYMPHOCYTES 1.5 0.8 - 3.5 K/UL    ABS. MONOCYTES 0.7 0.0 - 1.0 K/UL    ABS. EOSINOPHILS 0.3 0.0 - 0.4 K/UL    ABS. BASOPHILS 0.0 0.0 - 0.1 K/UL    ABS. IMM.  GRANS. 0.0 0.00 - 0.04 K/UL    DF AUTOMATED     METABOLIC PANEL, COMPREHENSIVE    Collection Time: 06/15/21  7:54 AM   Result Value Ref Range    Sodium 138 136 - 145 mmol/L    Potassium 4.0 3.5 - 5.1 mmol/L    Chloride 106 97 - 108 mmol/L    CO2 29 21 - 32 mmol/L    Anion gap 3 (L) 5 - 15 mmol/L    Glucose 66 65 - 100 mg/dL    BUN 15 6 - 20 MG/DL    Creatinine 0.66 0.55 - 1.02 MG/DL    BUN/Creatinine ratio 23 (H) 12 - 20      GFR est AA >60 >60 ml/min/1.73m2    GFR est non-AA >60 >60 ml/min/1.73m2    Calcium 8.7 8.5 - 10.1 MG/DL    Bilirubin, total 0.5 0.2 - 1.0 MG/DL    ALT (SGPT) 25 12 - 78 U/L    AST (SGOT) 24 15 - 37 U/L    Alk. phosphatase 91 45 - 117 U/L    Protein, total 6.4 6.4 - 8.2 g/dL    Albumin 3.8 3.5 - 5.0 g/dL    Globulin 2.6 2.0 - 4.0 g/dL    A-G Ratio 1.5 1.1 - 2.2       1225. D/C'd from [facility] ambulatory and in no distress accompanied by daughter.  Next appointment is 7/6/21 at 7:30 am.

## 2021-06-15 NOTE — PROGRESS NOTES
Cancer [place] at [facility] 88  [address] [facility], [address]  David Linen: [phone]  F: [phone]      Reason for Visit:   Becky Rodriguez is a 64 y.o. female who is seen in consultation at the request of Dr. Angelic Simmons for evaluation of therapy for breast cancer    Treatment History:   · 3/12/21 GEJ bx:  Squamocolumnar mucosa with reactive changes  · 4/16/21 right breast 1:00 core bx:  IDC, gr 3, 1.3 cm, gr 3, ER negative, SD negative, HER 2 negative at [facility] 1+  · 5/12/21 right breast lumpectomy: IDC with medullary features, 1.9 cm, gr 3, 0/1 LN, ER negative, SD negative, HER 2 negative at IHC 0, pT1c pN0 cM0  · IV CMF 6/15/21-    History of Present Illness:    An abnormal mammogram led to the pathology above    Interval history:  Gr 1 fatigue, gr 1 loss of cognition and concentration, gr 1 anxiety, gr 1 cough, gr 1 sob, gr 1 itching, gr 1 neuropathy, gr 1 LE edema, gr 1 headache, gr 1 urinary leakage    FH:  Maternal aunt with possible breast cancer at an unknown age; sister with ovarian cancer in her 62s; no prostate or pancreas cancer    Past Medical History:   Diagnosis Date    Allergic rhinitis due to other allergen     Arthritis     [facility] ([facility] [address]     right breast    Hypercholesterolemia     Psychiatric disorder     anxiety    Seizures ([facility] [address]     \"with stress\" last was 2017      Past Surgical History:   Procedure Laterality Date    HX BREAST LUMPECTOMY Right 5/12/2021    RIGHT BREAST LUMPECTOMY, ABNER CATH INSERTION, RIGHT BREAST SENTINEL NODE BIOPSY performed by Geovani Avila MD at [address] HX COLONOSCOPY  2021    HX SALPINGO-OOPHORECTOMY Left     at about age 26      Social History     Tobacco Use    Smoking status: Never Smoker    Smokeless tobacco: Never Used   Substance Use Topics    Alcohol use: No      Family History   Problem Relation Age of Onset    Diabetes Mother     Cancer Sister     Cancer Brother     Breast Cancer Maternal Aunt Current Outpatient Medications   Medication Sig    lidocaine-prilocaine (EMLA) topical cream Apply  to affected area as needed for Pain.  prochlorperazine (COMPAZINE) 10 mg tablet Take 1 Tablet by mouth every six (6) hours as needed for Nausea.  ondansetron hcl (ZOFRAN) 8 mg tablet Take 1 Tablet by mouth every eight (8) hours as needed for Nausea.  escitalopram oxalate (LEXAPRO) 10 mg tablet Take 10 mg by mouth daily. Indications: anxiousness associated with depression    alendronate (FOSAMAX) 35 mg tablet Take 70 mg by mouth every seven (7) days. mondays    timoloL (BETIMOL) 0.25 % ophthalmic solution Administer 1-2 Drops to both eyes two (2) times a day. use in affected eye(s)    latanoprost (XALATAN) 0.005 % ophthalmic solution Administer 1 Drop to both eyes daily.  diclofenac EC (VOLTAREN) 75 mg EC tablet TAKE ONE TABLET BY MOUTH TWICE DAILY     No current facility-administered medications for this visit. Facility-Administered Medications Ordered in Other Visits   Medication Dose Route Frequency    sodium chloride (NS) flush 10 mL  10 mL IntraVENous PRN    0.9% sodium chloride injection 10 mL  10 mL IntraVENous PRN    heparin (porcine) pf 300-500 Units  300-500 Units InterCATHeter PRN      Allergies   Allergen Reactions    Codeine Other (comments)     Other reaction(s): Other (see comments)  Hallucinations        Review of Systems: A complete review of systems was obtained, negative except as described above.     Physical Exam:     Visit Vitals  /62   Pulse (!) 59   Temp (!) 95.9 °F (35.5 °C) (Temporal)   Resp 18   Ht 5' 5\" (1.651 m)   Wt 150 lb (68 kg)   LMP 11/15/1990 (Exact Date)   SpO2 97%   BMI 24.96 kg/m²     ECOG PS: 0    Constitutional: Appears well-developed and well-nourished in no apparent distress      Mental status: Alert and awake, Oriented to person/place/time, Able to follow commands    Eyes: EOM normal, Sclera normal, No visible ocular discharge    HENT: Normocephalic, atraumatic    Mouth/Throat: Moist mucous membranes    External Ears normal    Neck: No visualized mass    Pulmonary/Chest: Respiratory effort normal, No visualized signs of difficulty breathing or respiratory distress    Musculoskeletal: Normal gait with no signs of ataxia, Normal range of motion of neck    Neurological: No facial asymmetry (Cranial nerve 7 motor function), No gaze palsy    Skin: No significant exanthematous lesions or discoloration noted on facial skin    Psychiatric: Normal affect, No hallucinations               Results:     Lab Results   Component Value Date/Time    WBC 5.0 06/15/2021 07:54 AM    HGB 11.7 06/15/2021 07:54 AM    HCT 37.6 06/15/2021 07:54 AM    PLATELET 006 32/40/0220 07:54 AM    MCV 97.2 06/15/2021 07:54 AM    ABS. NEUTROPHILS 2.5 06/15/2021 07:54 AM     Lab Results   Component Value Date/Time    Sodium 138 06/15/2021 07:54 AM    Potassium 4.0 06/15/2021 07:54 AM    Chloride 106 06/15/2021 07:54 AM    CO2 29 06/15/2021 07:54 AM    Glucose 66 06/15/2021 07:54 AM    BUN 15 06/15/2021 07:54 AM    Creatinine 0.66 06/15/2021 07:54 AM    GFR est AA >60 06/15/2021 07:54 AM    GFR est non-AA >60 06/15/2021 07:54 AM    Calcium 8.7 06/15/2021 07:54 AM     Lab Results   Component Value Date/Time    Bilirubin, total 0.5 06/15/2021 07:54 AM    ALT (SGPT) 25 06/15/2021 07:54 AM    Alk.  phosphatase 91 06/15/2021 07:54 AM    Protein, total 6.4 06/15/2021 07:54 AM    Albumin 3.8 06/15/2021 07:54 AM    Globulin 2.6 06/15/2021 07:54 AM     3/30/21 dexa  Findings:     Fractures identified on Lateral scanogram:  None     Femoral Neck Left:  Bone mineral density (gm/cm2):  0.698 g/cm?  % of peak bone mass:  67%  % for age matched controls:  92%  T-score:  -2.4   Z-score:  -0.4      Femoral Neck Right:  Bone mineral density (gm/cm2):  0.710 g/cm?  % of peak bone mass:  68%  % for age matched controls:  94%  T-score:  -2.4   Z-score:  -0.3      Total Hip Left:  Bone mineral density (gm/cm2):  0.774 g/cm?  % of peak bone mass:  77%  % for age matched controls:  100%  T-score:  -1.9   Z-score:  0.0      Total Hip Right:  Bone mineral density (gm/cm2):  0.736 g/cm?  % of peak bone mass:  73%  % for age matched controls:  95%  T-score:  -2.2   Z-score:  -0.3      33% Radius Left:  Bone mineral density (gm/cm2):  0.446 g/cm?  % of peak bone mass:  63%  % for age matched controls:  63%  T-score:  -3.7   Z-score:  -1.2      IMPRESSION     This patient is osteoporotic using the World Health Organization criteria  As compared to the prior study, there has been a significant 5.6% increase in  the left total hip and a significant 9% increase in the right total hip    4/12/21 right breast US  IMPRESSION should read \"BI-RADS 4. Suspicious abnormality. Recommend histologic  correlation. 1.8 cm x 1.6 cm x 1.2 cm 10:00 right breast lesion. Records reviewed and summarized above. Pathology report(s) reviewed above. Radiology report(s) reviewed above. Assessment/plan:   1. Right breast IDC, gr 3, triple negative, cT1c cN0 cM0, stage IA, prognostic stage IB    · 5/12/21 right breast lumpectomy: IDC with medullary features, 1.9 cm, gr 3, 0/1 LN, ER negative, [place] negative, HER 2 negative at IHC 0, pT1c pN0 cM0      We explained to the patient that the goal of systemic adjuvant therapy is to improve the chances for cure and decrease the risk of relapse. We explained why a patient can have microscopic cancer spread now even though physical examination, laboratory studies and imaging studies are negative for cancer. We explained that the same treatments used now as adjuvant or preventive treatments rarely if ever are curative in women who develop metastases. Using eprognosis. org, her 5 year all cause mortality risk is 9-15%; her 10 year all cause mortality risk is 34-43%; her median OS is 12-14 years. An adjuvant discussion is warranted.  No falls in past 6 months    Discussed IV CMF q 3 weeks x 6.  CMF (methotrexate 40 mg/m2, cyclophosphamide 600 mg/m2, fluorouracil 600 mg/m2).  Side effects discussed including, but not limited to, fatigue, myelosuppression, diarrhea, nausea, vomiting, mouth sores, and possible alopecia, and a possible, but very rare, severe allergic reaction to 5-FU.     Discussed increased risk of death with COVID19 and precautions to take with chemotherapy, I strongly recommend that she get the COVID19 vaccine, she got in on 5/19/21     Discussed cold caps, she declines    After this discussion, she is agreeable to CMF, and has signed informed consent. CMF #1 today. She has had her port placed with Dr. Chhaya Garvin the diclofenac, ok with alleve    Rx:  Zofran, compazine, emla cream    2. Emotional well being:  She has excellent support and is coping well with her disease    3. Osteoporosis:  On fosamax and vit D 50,000 units weekly    I appreciate the opportunity to participate in Ms. Francine Bhagat's care. Signed By: Denise Nichole MD      No orders of the defined types were placed in this encounter.

## 2021-06-16 ENCOUNTER — TELEPHONE (OUTPATIENT)
Dept: CARDIOLOGY CLINIC | Age: 76
End: 2021-06-16

## 2021-06-16 NOTE — TELEPHONE ENCOUNTER
Patient  was returning call from nurse. Looking for Lab results for his wife. Please give them a call back.       Phone [phone]

## 2021-06-16 NOTE — TELEPHONE ENCOUNTER
Called patient ID verified X2 reviewed below results per Dr Leonila Sen. Thyroid test - normal       Patient verbalized understanding.

## 2021-06-17 ENCOUNTER — DOCUMENTATION ONLY (OUTPATIENT)
Dept: CARDIOLOGY CLINIC | Age: 76
End: 2021-06-17

## 2021-06-17 NOTE — PROGRESS NOTES
Ms. Paula Henriquez is coming in for a nuclear stress test on June 28. In your notes under plan, you have [facility] nuclear study. The order states Exercise. Are we doing Exercise Nuclear study? Thank you for your help. Radha Hayes

## 2021-06-25 ENCOUNTER — TELEPHONE (OUTPATIENT)
Dept: CARDIOLOGY CLINIC | Age: 76
End: 2021-06-25

## 2021-06-25 NOTE — TELEPHONE ENCOUNTER
ID verified per protocol with ECDemar). Confirmed appointment(s) for 6-. Arrive @ [facility] 600 @ 8am. Patient instructed to be NPO x 4 hrs prior, no caffeine (not even decaf coffee,tea,jose f) x 24 hrs prior, wear comfortable clothes/good walking shoes. Patient also informed the test takes 4 hrs.  Patient verbalized understanding and agrees with prep/test.

## 2021-06-28 ENCOUNTER — ANCILLARY PROCEDURE (OUTPATIENT)
Dept: CARDIOLOGY CLINIC | Age: 76
End: 2021-06-28
Payer: MEDICARE

## 2021-06-28 VITALS — WEIGHT: 150 LBS | BODY MASS INDEX: 24.99 KG/M2 | HEIGHT: 65 IN

## 2021-06-28 DIAGNOSIS — I47.29 NSVT (NONSUSTAINED VENTRICULAR TACHYCARDIA): ICD-10-CM

## 2021-06-28 DIAGNOSIS — I45.5 SINUS PAUSE: ICD-10-CM

## 2021-06-28 DIAGNOSIS — I44.1 2ND DEGREE AV BLOCK: ICD-10-CM

## 2021-06-28 PROCEDURE — A9500 TC99M SESTAMIBI: HCPCS | Performed by: INTERNAL MEDICINE

## 2021-06-28 PROCEDURE — 78452 HT MUSCLE IMAGE SPECT MULT: CPT | Performed by: INTERNAL MEDICINE

## 2021-06-28 PROCEDURE — 93016 CV STRESS TEST SUPVJ ONLY: CPT | Performed by: INTERNAL MEDICINE

## 2021-06-28 PROCEDURE — 93018 CV STRESS TEST I&R ONLY: CPT | Performed by: INTERNAL MEDICINE

## 2021-06-28 PROCEDURE — 93017 CV STRESS TEST TRACING ONLY: CPT | Performed by: INTERNAL MEDICINE

## 2021-06-28 RX ORDER — TETRAKIS(2-METHOXYISOBUTYLISOCYANIDE)COPPER(I) TETRAFLUOROBORATE 1 MG/ML
30 INJECTION, POWDER, LYOPHILIZED, FOR SOLUTION INTRAVENOUS ONCE
Status: COMPLETED | OUTPATIENT
Start: 2021-06-28 | End: 2021-06-28

## 2021-06-28 RX ORDER — TETRAKIS(2-METHOXYISOBUTYLISOCYANIDE)COPPER(I) TETRAFLUOROBORATE 1 MG/ML
10 INJECTION, POWDER, LYOPHILIZED, FOR SOLUTION INTRAVENOUS ONCE
Status: COMPLETED | OUTPATIENT
Start: 2021-06-28 | End: 2021-06-28

## 2021-06-28 RX ORDER — AMINOPHYLLINE 25 MG/ML
50 INJECTION, SOLUTION INTRAVENOUS ONCE
Status: COMPLETED | OUTPATIENT
Start: 2021-06-28 | End: 2021-06-28

## 2021-06-28 RX ADMIN — REGADENOSON 0.4 MG: 0.08 INJECTION, SOLUTION INTRAVENOUS at 09:53

## 2021-06-28 RX ADMIN — TECHNETIUM TC 99M SESTAMIBI 8.5 MILLICURIE: 1 INJECTION, POWDER, FOR SOLUTION INTRAVENOUS at 08:35

## 2021-06-28 RX ADMIN — TECHNETIUM TC 99M SESTAMIBI 25.8 MILLICURIE: 1 INJECTION, POWDER, FOR SOLUTION INTRAVENOUS at 09:45

## 2021-06-28 RX ADMIN — AMINOPHYLLINE 50 MG: 25 INJECTION, SOLUTION INTRAVENOUS at 10:02

## 2021-06-29 ENCOUNTER — TELEPHONE (OUTPATIENT)
Dept: CARDIOLOGY CLINIC | Age: 76
End: 2021-06-29

## 2021-06-29 ENCOUNTER — TELEPHONE (OUTPATIENT)
Dept: ONCOLOGY | Age: 76
End: 2021-06-29

## 2021-06-29 LAB
STRESS BASELINE DIAS BP: 60 MMHG
STRESS BASELINE HR: 58 BPM
STRESS BASELINE SYS BP: 126 MMHG
STRESS ESTIMATED WORKLOAD: 1 METS
STRESS EXERCISE DUR MIN: NORMAL
STRESS O2 SAT PEAK: 100 %
STRESS O2 SAT REST: 100 %
STRESS PEAK DIAS BP: 70 MMHG
STRESS PEAK SYS BP: 136 MMHG
STRESS PERCENT HR ACHIEVED: 69 %
STRESS POST PEAK HR: 98 BPM
STRESS RATE PRESSURE PRODUCT: NORMAL BPM*MMHG
STRESS ST DEPRESSION: 0 MM
STRESS ST ELEVATION: 0 MM
STRESS TARGET HR: 142 BPM

## 2021-06-29 NOTE — TELEPHONE ENCOUNTER
Debbie Juarez from the dental office needs to know if they can give the patient antibiotic for a bad tooth.   Please advise:    Cb # [phone]

## 2021-06-29 NOTE — TELEPHONE ENCOUNTER
Attempted to reach patient by telephone.  A message was left for return call regarding Nuc stress test result

## 2021-06-29 NOTE — TELEPHONE ENCOUNTER
6/29/21 10:31 AM: Returned call to Sixto Boeck from patient's dentistry office. Sixto Boeck stated that they saw the patient yesterday and would like to treat the patient with either clindamycin or amoxicillin for a possibly infected tooth. Sixto Boeck stated that the patient's symptoms \"were all over the place\" so this could be inflammation rather than infection but Dr. Jennifer Gr would like to treat with an antibiotic. Advised that Denise Davis NP, will be consulted and Luis Fernandorodney Nevesroge will receive a call back. 6/29/21 12:48 PM: Called Dr. Jackson Im office and spoke to Payam Reina. Advised that per Denise Davis NP, either clindamycin or amoxicillin should be fine in short course and she will defer to Dr. Jennifer Gr on which to prescribe. Advised that per NP, amoxicillin can increase levels of MTX when taken together but in short course, should be okay. Also advised that there is an increased risk of colitis with clindamycin. Payam Reina verbalized understanding and did not have any questions at this time.

## 2021-06-29 NOTE — PROGRESS NOTES
Verified patient with two patient identifiers. Called patient and informed her of normal  stress Nuclear test results per . Patient verbalized understanding.

## 2021-07-05 NOTE — PROGRESS NOTES
Cancer [place] at [facility]  [address] [facility]  Timothy Regalado: [phone]  F: [phone]      Reason for Visit:   Mikel Campbell is a 64 y.o. female who is seen in follow up for evaluation of therapy for breast cancer. Breast surgery: Dr. Jame Mancera    Treatment History:   · 3/12/21 GEJ bx:  Squamocolumnar mucosa with reactive changes  · 4/16/21 right breast 1:00 core bx:  IDC, gr 3, 1.3 cm, gr 3, ER negative, MS negative, HER 2 negative at [facility] 1+  · 5/12/21 right breast lumpectomy: IDC with medullary features, 1.9 cm, gr 3, 0/1 LN, ER negative, MS negative, HER 2 negative at IHC 0, pT1c pN0 cM0  · IV CMF 6/15/21-    History of Present Illness: An abnormal mammogram led to the pathology above    Interval history:  Reports grade 1 loss of appetite, fatigue, constipation, nausea. No vomiting. Took a few Zofran with relief of symptoms. Gr 1 loss of cognition and concentration, gr 1 anxiety. Denies pain. She has lose 4 lbs since last visit. Gr 1 cough, gr 1 sob, gr 1 itching, gr 1 neuropathy, gr 1 LE edema, gr 1 headache, gr 1 urinary leakage with coughing/sneezing.      FH:  Maternal aunt with possible breast cancer at an unknown age; sister with ovarian cancer in her 62s; no prostate or pancreas cancer    Past Medical History:   Diagnosis Date    Allergic rhinitis due to other allergen     Arthritis     Cancer ([address]     right breast    Hypercholesterolemia     Psychiatric disorder     anxiety    Seizures ([facility] [address]     \"with stress\" last was 2017      Past Surgical History:   Procedure Laterality Date    HX BREAST LUMPECTOMY Right 5/12/2021    RIGHT BREAST LUMPECTOMY, ABNER CATH INSERTION, RIGHT BREAST SENTINEL NODE BIOPSY performed by Gerson Calabrese MD at [facility] HX COLONOSCOPY  2021    HX SALPINGO-OOPHORECTOMY Left     at about age 26      Social History     Tobacco Use    Smoking status: Never Smoker    Smokeless tobacco: Never Used Substance Use Topics    Alcohol use: No      Family History   Problem Relation Age of Onset    Diabetes Mother     Cancer Sister     Cancer Brother     Breast Cancer Maternal Aunt      Current Outpatient Medications   Medication Sig    lidocaine-prilocaine (EMLA) topical cream Apply  to affected area as needed for Pain.  prochlorperazine (COMPAZINE) 10 mg tablet Take 1 Tablet by mouth every six (6) hours as needed for Nausea.  ondansetron hcl (ZOFRAN) 8 mg tablet Take 1 Tablet by mouth every eight (8) hours as needed for Nausea.  escitalopram oxalate (LEXAPRO) 10 mg tablet Take 10 mg by mouth daily. Indications: anxiousness associated with depression    alendronate (FOSAMAX) 35 mg tablet Take 70 mg by mouth every seven (7) days. mondays    timoloL (BETIMOL) 0.25 % ophthalmic solution Administer 1-2 Drops to both eyes two (2) times a day. use in affected eye(s)    latanoprost (XALATAN) 0.005 % ophthalmic solution Administer 1 Drop to both eyes daily. No current facility-administered medications for this visit. Allergies   Allergen Reactions    Codeine Other (comments)     Other reaction(s): Other (see comments)  Hallucinations        Review of Systems: A complete review of systems was obtained, negative except as described above.     Physical Exam:     Visit Vitals  BP (!) 140/75   Pulse (!) 52   Temp 98.2 °F (36.8 °C) (Temporal)   Resp 18   Ht 5' 5\" (1.651 m)   Wt 148 lb (67.1 kg)   LMP 11/15/1990 (Exact Date)   SpO2 96%   BMI 24.63 kg/m²     ECOG PS: 0    Constitutional: Appears well-developed and well-nourished in no apparent distress    Mental status: Alert and awake, Oriented to person/place/time, Able to follow commands  Eyes: EOM normal, Sclera normal, No visible ocular discharge  HENT: Normocephalic, atraumatic  Mouth/Throat: Moist mucous membranes  External Ears normal  Neck: No visualized mass  Pulmonary/Chest: Respiratory effort normal, No visualized signs of difficulty breathing or respiratory distress  Musculoskeletal: Normal gait with no signs of ataxia, Normal range of motion of neck  Neurological: No facial asymmetry (Cranial nerve 7 motor function), No gaze palsy  Skin: No significant exanthematous lesions or discoloration noted on facial skin  Psychiatric: Normal affect, No hallucinations     Results:     Lab Results   Component Value Date/Time    WBC 3.3 (L) 07/06/2021 08:03 AM    HGB 11.3 (L) 07/06/2021 08:03 AM    HCT 36.1 07/06/2021 08:03 AM    PLATELET 023 19/62/0940 08:03 AM    MCV 96.5 07/06/2021 08:03 AM    ABS. NEUTROPHILS PENDING 07/06/2021 08:03 AM     Lab Results   Component Value Date/Time    Sodium 139 07/06/2021 08:03 AM    Potassium 3.9 07/06/2021 08:03 AM    Chloride 106 07/06/2021 08:03 AM    CO2 31 07/06/2021 08:03 AM    Glucose 81 07/06/2021 08:03 AM    BUN 16 07/06/2021 08:03 AM    Creatinine 0.55 07/06/2021 08:03 AM    GFR est AA >60 07/06/2021 08:03 AM    GFR est non-AA >60 07/06/2021 08:03 AM    Calcium 9.0 07/06/2021 08:03 AM     Lab Results   Component Value Date/Time    Bilirubin, total 0.4 07/06/2021 08:03 AM    ALT (SGPT) 20 07/06/2021 08:03 AM    Alk. phosphatase 91 07/06/2021 08:03 AM    Protein, total 6.3 (L) 07/06/2021 08:03 AM    Albumin 3.6 07/06/2021 08:03 AM    Globulin 2.7 07/06/2021 08:03 AM     3/30/21 dexa     This patient is osteoporotic using the World Health Organization criteria  As compared to the prior study, there has been a significant 5.6% increase in  the left total hip and a significant 9% increase in the right total hip    4/12/21 right breast US  IMPRESSION should read \"BI-RADS 4. Suspicious abnormality. Recommend histologic  correlation. 1.8 cm x 1.6 cm x 1.2 cm 10:00 right breast lesion. Records reviewed and summarized above. Pathology report(s) reviewed above. Radiology report(s) reviewed above. Assessment/plan:   1.  Right breast IDC, gr 3, triple negative, cT1c cN0 cM0, stage IA, prognostic stage IB    · 5/12/21 right breast lumpectomy: IDC with medullary features, 1.9 cm, gr 3, 0/1 LN, ER negative, [place] negative, HER 2 negative at IHC 0, pT1c pN0 cM0    We explained to the patient that the goal of systemic adjuvant therapy is to improve the chances for cure and decrease the risk of relapse. We explained why a patient can have microscopic cancer spread now even though physical examination, laboratory studies and imaging studies are negative for cancer. We explained that the same treatments used now as adjuvant or preventive treatments rarely if ever are curative in women who develop metastases. Using eprognosis. org, her 5 year all cause mortality risk is 9-15%; her 10 year all cause mortality risk is 34-43%; her median OS is 12-14 years. An adjuvant discussion is warranted. No falls in past 6 months    Discussed IV CMF q 3 weeks x 6.  CMF (methotrexate 40 mg/m2, cyclophosphamide 600 mg/m2, fluorouracil 600 mg/m2).  Side effects discussed including, but not limited to, fatigue, myelosuppression, diarrhea, nausea, vomiting, mouth sores, and possible alopecia, and a possible, but very rare, severe allergic reaction to 5-FU.     Discussed increased risk of death with COVID19 and precautions to take with chemotherapy. Received COVID vaccination on 5/19/21     Discussed cold caps, she declines    After this discussion, she is agreeable to CMF, and has signed informed consent. CMF #2 today. She has had her port placed with Dr. Swapnil Mata    Rx:  Zofran, compazine, emla cream    2. Emotional well being:  She has excellent support and is coping well with her disease    3. Osteoporosis:  On fosamax and vit D 50,000 units weekly    4. Osteoarthritis: She has been off Diclofenac due to chemotherapy. Previously taking twice daily. Tried Aleve PRN, but doesn't feel this is helping. Will need to stay off of the diclofenac while on CMF.     5. Anemia:  Mild, due to chemo    This patient was seen in conjunction with [address]  Fabian Welch NP. I personally reviewed the history and all points in the assessment and plan with the patient, and performed key points on the exam. Right breast IDC, TN, CMF #2 today, RTC 3 weeks      Signed By: Eddie Fisher MD      No orders of the defined types were placed in this encounter.

## 2021-07-06 ENCOUNTER — HOSPITAL ENCOUNTER (OUTPATIENT)
Dept: INFUSION THERAPY | Age: 76
Discharge: HOME | End: 2021-07-06
Payer: MEDICARE

## 2021-07-06 ENCOUNTER — OFFICE VISIT (OUTPATIENT)
Dept: ONCOLOGY | Age: 76
End: 2021-07-06
Payer: MEDICARE

## 2021-07-06 VITALS
DIASTOLIC BLOOD PRESSURE: 66 MMHG | RESPIRATION RATE: 18 BRPM | SYSTOLIC BLOOD PRESSURE: 107 MMHG | BODY MASS INDEX: 24.66 KG/M2 | OXYGEN SATURATION: 95 % | WEIGHT: 148 LBS | TEMPERATURE: 98.2 F | HEART RATE: 71 BPM | HEIGHT: 65 IN

## 2021-07-06 VITALS
RESPIRATION RATE: 18 BRPM | WEIGHT: 148 LBS | OXYGEN SATURATION: 96 % | DIASTOLIC BLOOD PRESSURE: 75 MMHG | HEIGHT: 65 IN | HEART RATE: 52 BPM | TEMPERATURE: 98.2 F | SYSTOLIC BLOOD PRESSURE: 140 MMHG | BODY MASS INDEX: 24.66 KG/M2

## 2021-07-06 DIAGNOSIS — Z17.1 MALIGNANT NEOPLASM OF UPPER-OUTER QUADRANT OF RIGHT BREAST IN FEMALE, ESTROGEN RECEPTOR NEGATIVE (HCC): Primary | ICD-10-CM

## 2021-07-06 DIAGNOSIS — C50.411 MALIGNANT NEOPLASM OF UPPER-OUTER QUADRANT OF RIGHT BREAST IN FEMALE, ESTROGEN RECEPTOR NEGATIVE (HCC): Primary | ICD-10-CM

## 2021-07-06 LAB
ALBUMIN SERPL-MCNC: 3.6 G/DL (ref 3.5–5)
ALBUMIN/GLOB SERPL: 1.3 {RATIO} (ref 1.1–2.2)
ALP SERPL-CCNC: 91 U/L (ref 45–117)
ALT SERPL-CCNC: 20 U/L (ref 12–78)
ANION GAP SERPL CALC-SCNC: 2 MMOL/L (ref 5–15)
AST SERPL-CCNC: 22 U/L (ref 15–37)
BASOPHILS # BLD: 0 K/UL (ref 0–0.1)
BASOPHILS NFR BLD: 0 % (ref 0–1)
BILIRUB SERPL-MCNC: 0.4 MG/DL (ref 0.2–1)
BUN SERPL-MCNC: 16 MG/DL (ref 6–20)
BUN/CREAT SERPL: 29 (ref 12–20)
CALCIUM SERPL-MCNC: 9 MG/DL (ref 8.5–10.1)
CHLORIDE SERPL-SCNC: 106 MMOL/L (ref 97–108)
CO2 SERPL-SCNC: 31 MMOL/L (ref 21–32)
CREAT SERPL-MCNC: 0.55 MG/DL (ref 0.55–1.02)
DIFFERENTIAL METHOD BLD: ABNORMAL
EOSINOPHIL # BLD: 0 K/UL (ref 0–0.4)
EOSINOPHIL NFR BLD: 1 % (ref 0–7)
ERYTHROCYTE [DISTWIDTH] IN BLOOD BY AUTOMATED COUNT: 13.8 % (ref 11.5–14.5)
GLOBULIN SER CALC-MCNC: 2.7 G/DL (ref 2–4)
GLUCOSE SERPL-MCNC: 81 MG/DL (ref 65–100)
HCT VFR BLD AUTO: 36.1 % (ref 35–47)
HGB BLD-MCNC: 11.3 G/DL (ref 11.5–16)
IMM GRANULOCYTES # BLD AUTO: 0 K/UL
IMM GRANULOCYTES NFR BLD AUTO: 0 %
LYMPHOCYTES # BLD: 0.9 K/UL (ref 0.8–3.5)
LYMPHOCYTES NFR BLD: 28 % (ref 12–49)
MCH RBC QN AUTO: 30.2 PG (ref 26–34)
MCHC RBC AUTO-ENTMCNC: 31.3 G/DL (ref 30–36.5)
MCV RBC AUTO: 96.5 FL (ref 80–99)
METAMYELOCYTES NFR BLD MANUAL: 1 %
MONOCYTES # BLD: 0.5 K/UL (ref 0–1)
MONOCYTES NFR BLD: 15 % (ref 5–13)
MYELOCYTES NFR BLD MANUAL: 3 %
NEUTS BAND NFR BLD MANUAL: 5 % (ref 0–6)
NEUTS SEG # BLD: 1.7 K/UL (ref 1.8–8)
NEUTS SEG NFR BLD: 47 % (ref 32–75)
NRBC # BLD: 0 K/UL (ref 0–0.01)
NRBC BLD-RTO: 0 PER 100 WBC
PLATELET # BLD AUTO: 297 K/UL (ref 150–400)
PMV BLD AUTO: 9.6 FL (ref 8.9–12.9)
POTASSIUM SERPL-SCNC: 3.9 MMOL/L (ref 3.5–5.1)
PROT SERPL-MCNC: 6.3 G/DL (ref 6.4–8.2)
RBC # BLD AUTO: 3.74 M/UL (ref 3.8–5.2)
RBC MORPH BLD: ABNORMAL
SODIUM SERPL-SCNC: 139 MMOL/L (ref 136–145)
WBC # BLD AUTO: 3.3 K/UL (ref 3.6–11)

## 2021-07-06 PROCEDURE — G0463 HOSPITAL OUTPT CLINIC VISIT: HCPCS | Performed by: INTERNAL MEDICINE

## 2021-07-06 PROCEDURE — G8536 NO DOC ELDER MAL SCRN: HCPCS | Performed by: INTERNAL MEDICINE

## 2021-07-06 PROCEDURE — G8510 SCR DEP NEG, NO PLAN REQD: HCPCS | Performed by: INTERNAL MEDICINE

## 2021-07-06 PROCEDURE — 36415 COLL VENOUS BLD VENIPUNCTURE: CPT

## 2021-07-06 PROCEDURE — G8420 CALC BMI NORM PARAMETERS: HCPCS | Performed by: INTERNAL MEDICINE

## 2021-07-06 PROCEDURE — 77030016057 HC NDL HUBR APOL -B

## 2021-07-06 PROCEDURE — G8399 PT W/DXA RESULTS DOCUMENT: HCPCS | Performed by: INTERNAL MEDICINE

## 2021-07-06 PROCEDURE — 96411 CHEMO IV PUSH ADDL DRUG: CPT

## 2021-07-06 PROCEDURE — 80053 COMPREHEN METABOLIC PANEL: CPT

## 2021-07-06 PROCEDURE — G8427 DOCREV CUR MEDS BY ELIG CLIN: HCPCS | Performed by: INTERNAL MEDICINE

## 2021-07-06 PROCEDURE — 74011250636 HC RX REV CODE- 250/636: Performed by: INTERNAL MEDICINE

## 2021-07-06 PROCEDURE — 85025 COMPLETE CBC W/AUTO DIFF WBC: CPT

## 2021-07-06 PROCEDURE — 1101F PT FALLS ASSESS-DOCD LE1/YR: CPT | Performed by: INTERNAL MEDICINE

## 2021-07-06 PROCEDURE — 96413 CHEMO IV INFUSION 1 HR: CPT

## 2021-07-06 PROCEDURE — 1090F PRES/ABSN URINE INCON ASSESS: CPT | Performed by: INTERNAL MEDICINE

## 2021-07-06 PROCEDURE — 96367 TX/PROPH/DG ADDL SEQ IV INF: CPT

## 2021-07-06 PROCEDURE — 96375 TX/PRO/DX INJ NEW DRUG ADDON: CPT

## 2021-07-06 PROCEDURE — 74011000258 HC RX REV CODE- 258: Performed by: INTERNAL MEDICINE

## 2021-07-06 PROCEDURE — 99215 OFFICE O/P EST HI 40 MIN: CPT | Performed by: INTERNAL MEDICINE

## 2021-07-06 RX ORDER — FLUOROURACIL 50 MG/ML
1000 INJECTION, SOLUTION INTRAVENOUS ONCE
Status: COMPLETED | OUTPATIENT
Start: 2021-07-06 | End: 2021-07-06

## 2021-07-06 RX ORDER — PALONOSETRON 0.05 MG/ML
0.25 INJECTION, SOLUTION INTRAVENOUS ONCE
Status: COMPLETED | OUTPATIENT
Start: 2021-07-06 | End: 2021-07-06

## 2021-07-06 RX ORDER — HEPARIN 100 UNIT/ML
300-500 SYRINGE INTRAVENOUS AS NEEDED
Status: ACTIVE | OUTPATIENT
Start: 2021-07-06 | End: 2021-07-06

## 2021-07-06 RX ORDER — SODIUM CHLORIDE 9 MG/ML
10 INJECTION INTRAMUSCULAR; INTRAVENOUS; SUBCUTANEOUS AS NEEDED
Status: ACTIVE | OUTPATIENT
Start: 2021-07-06 | End: 2021-07-06

## 2021-07-06 RX ORDER — SODIUM CHLORIDE 9 MG/ML
25 INJECTION, SOLUTION INTRAVENOUS CONTINUOUS
Status: DISPENSED | OUTPATIENT
Start: 2021-07-06 | End: 2021-07-06

## 2021-07-06 RX ORDER — METHOTREXATE 25 MG/ML
40 INJECTION, SOLUTION INTRA-ARTERIAL; INTRAMUSCULAR; INTRAVENOUS ONCE
Status: COMPLETED | OUTPATIENT
Start: 2021-07-06 | End: 2021-07-06

## 2021-07-06 RX ORDER — SODIUM CHLORIDE 0.9 % (FLUSH) 0.9 %
10 SYRINGE (ML) INJECTION AS NEEDED
Status: DISPENSED | OUTPATIENT
Start: 2021-07-06 | End: 2021-07-06

## 2021-07-06 RX ADMIN — FOSAPREPITANT 150 MG: 150 INJECTION, POWDER, LYOPHILIZED, FOR SOLUTION INTRAVENOUS at 09:32

## 2021-07-06 RX ADMIN — DEXAMETHASONE SODIUM PHOSPHATE 12 MG: 10 INJECTION, SOLUTION INTRAMUSCULAR; INTRAVENOUS at 09:32

## 2021-07-06 RX ADMIN — SODIUM CHLORIDE 25 ML/HR: 9 INJECTION, SOLUTION INTRAVENOUS at 09:26

## 2021-07-06 RX ADMIN — METHOTREXATE 70 MG: 25 INJECTION, SOLUTION INTRA-ARTERIAL; INTRAMUSCULAR; INTRAVENOUS at 10:41

## 2021-07-06 RX ADMIN — PALONOSETRON 0.25 MG: 0.25 INJECTION, SOLUTION INTRAVENOUS at 09:26

## 2021-07-06 RX ADMIN — FLUOROURACIL 1000 MG: 50 INJECTION, SOLUTION INTRAVENOUS at 12:23

## 2021-07-06 RX ADMIN — Medication 10 ML: at 12:33

## 2021-07-06 RX ADMIN — HEPARIN 500 UNITS: 100 SYRINGE at 12:33

## 2021-07-06 RX ADMIN — CYCLOPHOSPHAMIDE 1000 MG: 500 INJECTION, POWDER, FOR SOLUTION INTRAVENOUS; ORAL at 10:49

## 2021-07-06 NOTE — PROGRESS NOTES
[facility] Progress Note    Date: 2021    Name: Clare Ballesteros    MRN: [id number]         : 1945    Ms. Pee Ronquillo Arrived ambulatory and in no distress for C2D1 of CMF Regimen. Assessment was completed, no acute issues at this time, no new complaints voiced. Left chest wall port accessed without difficulty, labs drawn & sent for processing. Chemotherapy Flowsheet 2021   Cycle C2D1   Date 2021   Drug / Regimen CMF   Pre Meds given   Notes given     Patient proceed to appointment with Dr. Hernan Santamaria. Ms. Bhagat's vitals were reviewed. Visit Vitals  BP (!) 140/75 (BP 1 Location: Left arm, BP Patient Position: Sitting)   Pulse (!) 52   Temp 98.2 °F (36.8 °C)   Resp 18   Ht 5' 5\" (1.651 m)   Wt 67.1 kg (148 lb)   SpO2 96%   BMI 24.63 kg/m²       Lab results were obtained and reviewed. Recent Results (from the past 8 hour(s))   CBC WITH AUTOMATED DIFF    Collection Time: 21  8:03 AM   Result Value Ref Range    WBC 3.3 (L) 3.6 - 11.0 K/uL    RBC 3.74 (L) 3.80 - 5.20 M/uL    HGB 11.3 (L) 11.5 - 16.0 g/dL    HCT 36.1 35.0 - 47.0 %    MCV 96.5 80.0 - 99.0 FL    MCH 30.2 26.0 - 34.0 PG    MCHC 31.3 30.0 - 36.5 g/dL    RDW 13.8 11.5 - 14.5 %    PLATELET 899 073 - 692 K/uL    MPV 9.6 8.9 - 12.9 FL    NRBC 0.0 0  WBC    ABSOLUTE NRBC 0.00 0.00 - 0.01 K/uL    NEUTROPHILS 47 32 - 75 %    BAND NEUTROPHILS 5 0 - 6 %    LYMPHOCYTES 28 12 - 49 %    MONOCYTES 15 (H) 5 - 13 %    EOSINOPHILS 1 0 - 7 %    BASOPHILS 0 0 - 1 %    METAMYELOCYTES 1 (H) 0 %    MYELOCYTES 3 (H) 0 %    IMMATURE GRANULOCYTES 0 %    ABS. NEUTROPHILS 1.7 (L) 1.8 - 8.0 K/UL    ABS. LYMPHOCYTES 0.9 0.8 - 3.5 K/UL    ABS. MONOCYTES 0.5 0.0 - 1.0 K/UL    ABS. EOSINOPHILS 0.0 0.0 - 0.4 K/UL    ABS. BASOPHILS 0.0 0.0 - 0.1 K/UL    ABS. IMM.  GRANS. 0.0 K/UL    DF MANUAL      RBC COMMENTS NORMOCYTIC, NORMOCHROMIC     METABOLIC PANEL, COMPREHENSIVE    Collection Time: 21  8:03 AM   Result Value Ref Range    Sodium 139 136 - 145 mmol/L    Potassium 3.9 3.5 - 5.1 mmol/L    Chloride 106 97 - 108 mmol/L    CO2 31 21 - 32 mmol/L    Anion gap 2 (L) 5 - 15 mmol/L    Glucose 81 65 - 100 mg/dL    BUN 16 6 - 20 MG/DL    Creatinine 0.55 0.55 - 1.02 MG/DL    BUN/Creatinine ratio 29 (H) 12 - 20      GFR est AA >60 >60 ml/min/1.73m2    GFR est non-AA >60 >60 ml/min/1.73m2    Calcium 9.0 8.5 - 10.1 MG/DL    Bilirubin, total 0.4 0.2 - 1.0 MG/DL    ALT (SGPT) 20 12 - 78 U/L    AST (SGOT) 22 15 - 37 U/L    Alk.  phosphatase 91 45 - 117 U/L    Protein, total 6.3 (L) 6.4 - 8.2 g/dL    Albumin 3.6 3.5 - 5.0 g/dL    Globulin 2.7 2.0 - 4.0 g/dL    A-G Ratio 1.3 1.1 - 2.2         Medications:  Medications Administered     0.9% sodium chloride infusion     Admin Date  07/06/2021 Action  New Bag Dose  25 mL/hr Rate  25 mL/hr Route  IntraVENous Administered By  Ama Santiago RN          cyclophosphamide (CYTOXAN) 1,000 mg in 0.9% sodium chloride 250 mL, overfill volume 25 mL chemo infusion     Admin Date  07/06/2021 Action  New Bag Dose  1,000 mg Rate  650 mL/hr Route  IntraVENous Administered By  Ama Santiago RN          dexamethasone (DECADRON) 12 mg in 0.9% sodium chloride 50 mL IVPB     Admin Date  07/06/2021 Action  New Bag Dose  12 mg Route  IntraVENous Administered By  Ama Santiago RN          fluorouraciL (ADRUCIL) chemo syringe 1,000 mg     Admin Date  07/06/2021 Action  Given Dose  1,000 mg Rate  300 mL/hr Route  IntraVENous Administered By  Ama Santiago RN          fosaprepitant (EMEND) 150 mg in 0.9% sodium chloride 150 mL IVPB     Admin Date  07/06/2021 Action  New Bag Dose  150 mg Rate  450 mL/hr Route  IntraVENous Administered By  Ama Santiago RN          heparin (porcine) pf 300-500 Units     Admin Date  07/06/2021 Action  Given Dose  500 Units Route  InterCATHeter Administered By  Ama Santiago, RN          methotrexate chemo syringe 70 mg     Admin Date  07/06/2021 Action  Given Dose  70 mg Route  IntraVENous Administered By  Arnold Marques RN          palonosetron HCl (ALOXI) injection 0.25 mg     Admin Date  07/06/2021 Action  Given Dose  0.25 mg Route  IntraVENous Administered By  Arnold Marques RN          sodium chloride (NS) flush 10 mL     Admin Date  07/06/2021 Action  Given Dose  10 mL Route  IntraVENous Administered By  Arnold Marques RN                Ms. Natalia Cho tolerated treatment well and was discharged from [place] [address] in stable condition at 1240pm.   Port de-accessed, flushed & heparinized per protocol. She is to return on July 27 2021 at 0730am for her next appointment.     Stacy Bowser RN  July 6, 2021

## 2021-07-23 NOTE — PROGRESS NOTES
[facility] [place] [facility]  [address] Font: [phone]  F: [phone]      Reason for Visit:   Jannette Morocho is a 64 y.o. female who is seen in follow up for evaluation of therapy for breast cancer. Breast surgery: Dr. Dar Wiggins    Treatment History:   · 3/12/21 GEJ bx:  Squamocolumnar mucosa with reactive changes  · 4/16/21 right breast 1:00 core bx:  IDC, gr 3, 1.3 cm, gr 3, ER negative, KS negative, HER 2 negative at [facility] 1[facility]  · 5/12/21 right breast lumpectomy: IDC with medullary features, 1.9 cm, gr 3, 0/1 LN, ER negative, KS negative, HER 2 negative at IHC 0, pT1c pN0 cM0  · IV CMF 6/15/21-    History of Present Illness: An abnormal mammogram led to the pathology above    Interval history:  Presents today for follow up and treatment. She complains of gr 1 fatigue, gr 1 hair loss, gr 1 nausea, gr 1 cognition, gr 2 concentration, gr 2 anxiety/depression, gr 1 dyspnea, gr 1 tachycardia, gr 1 neuropathy, gr 1 edema, gr 1 urinary frequency, gr 1 urinary incontinence. No complaints of pain.     FH:  Maternal aunt with possible breast cancer at an unknown age; sister with ovarian cancer in her 62s; no prostate or pancreas cancer    Past Medical History:   Diagnosis Date    Allergic rhinitis due to other allergen     Arthritis     Cancer ([address]     right breast    Hypercholesterolemia     Psychiatric disorder     anxiety    Seizures ([address]     \"with stress\" last was 2017      Past Surgical History:   Procedure Laterality Date    HX BREAST LUMPECTOMY Right 5/12/2021    RIGHT BREAST LUMPECTOMY, ABNER CATH INSERTION, RIGHT BREAST SENTINEL NODE BIOPSY performed by Gary Powell MD at [address] HX COLONOSCOPY  2021    HX SALPINGO-OOPHORECTOMY Left     at about age 26      Social History     Tobacco Use    Smoking status: Never Smoker    Smokeless tobacco: Never Used   Substance Use Topics    Alcohol use: No      Family History   Problem Relation Age of Onset    Diabetes Mother     Cancer Sister     Cancer Brother     Breast Cancer Maternal Aunt      Current Outpatient Medications   Medication Sig    lidocaine-prilocaine (EMLA) topical cream Apply  to affected area as needed for Pain.  prochlorperazine (COMPAZINE) 10 mg tablet Take 1 Tablet by mouth every six (6) hours as needed for Nausea.  ondansetron hcl (ZOFRAN) 8 mg tablet Take 1 Tablet by mouth every eight (8) hours as needed for Nausea.  escitalopram oxalate (LEXAPRO) 10 mg tablet Take 10 mg by mouth daily. Indications: anxiousness associated with depression    alendronate (FOSAMAX) 35 mg tablet Take 70 mg by mouth every seven (7) days. mondays    timoloL (BETIMOL) 0.25 % ophthalmic solution Administer 1-2 Drops to both eyes two (2) times a day. use in affected eye(s)    latanoprost (XALATAN) 0.005 % ophthalmic solution Administer 1 Drop to both eyes daily. No current facility-administered medications for this visit. Facility-Administered Medications Ordered in Other Visits   Medication Dose Route Frequency    sodium chloride (NS) flush 10 mL  10 mL IntraVENous PRN    0.9% sodium chloride injection 10 mL  10 mL IntraVENous PRN    heparin (porcine) pf 300-500 Units  300-500 Units InterCATHeter PRN      Allergies   Allergen Reactions    Codeine Other (comments)     Other reaction(s): Other (see comments)  Hallucinations        Review of Systems: A complete review of systems was obtained, negative except as described above.     Physical Exam:     Visit Vitals  BP (!) 138/54 (BP 1 Location: Left upper arm, BP Patient Position: Sitting)   Pulse 70   Temp 97 °F (36.1 °C)   Resp 18   Ht 5' 5\" (1.651 m)   Wt 148 lb (67.1 kg)   LMP 11/15/1990 (Exact Date)   SpO2 98%   BMI 24.63 kg/m²     ECOG PS: 0  Constitutional: Appears well-developed and well-nourished in no apparent distress    Mental status: Alert and awake, Oriented to person/place/time, Able to follow commands  Eyes: EOM normal, Sclera normal, No visible ocular discharge  HENT: Normocephalic, atraumatic  Mouth/Throat: Moist mucous membranes  External Ears normal  Neck: No visualized mass  Pulmonary/Chest: Respiratory effort normal, No visualized signs of difficulty breathing or respiratory distress  Musculoskeletal: Normal gait with no signs of ataxia, Normal range of motion of neck  Neurological: No facial asymmetry (Cranial nerve 7 motor function), No gaze palsy  Skin: No significant exanthematous lesions or discoloration noted on facial skin  Psychiatric: Normal affect, No hallucinations     Results:     Lab Results   Component Value Date/Time    WBC 3.8 07/27/2021 08:00 AM    HGB 11.1 (L) 07/27/2021 08:00 AM    HCT 34.9 (L) 07/27/2021 08:00 AM    PLATELET 295 28/24/3366 08:00 AM    MCV 95.6 07/27/2021 08:00 AM    ABS. NEUTROPHILS 1.7 (L) 07/27/2021 08:00 AM     Lab Results   Component Value Date/Time    Sodium 139 07/27/2021 08:00 AM    Potassium 3.8 07/27/2021 08:00 AM    Chloride 106 07/27/2021 08:00 AM    CO2 29 07/27/2021 08:00 AM    Glucose 77 07/27/2021 08:00 AM    BUN 13 07/27/2021 08:00 AM    Creatinine 0.57 07/27/2021 08:00 AM    GFR est AA >60 07/27/2021 08:00 AM    GFR est non-AA >60 07/27/2021 08:00 AM    Calcium 8.6 07/27/2021 08:00 AM     Lab Results   Component Value Date/Time    Bilirubin, total 0.5 07/27/2021 08:00 AM    ALT (SGPT) 20 07/27/2021 08:00 AM    Alk. phosphatase 96 07/27/2021 08:00 AM    Protein, total 6.3 (L) 07/27/2021 08:00 AM    Albumin 3.4 (L) 07/27/2021 08:00 AM    Globulin 2.9 07/27/2021 08:00 AM     3/30/21 dexa     This patient is osteoporotic using the World Health Organization criteria  As compared to the prior study, there has been a significant 5.6% increase in  the left total hip and a significant 9% increase in the right total hip    4/12/21 right breast US  IMPRESSION should read \"BI-RADS 4. Suspicious abnormality. Recommend histologic  correlation.  1.8 cm x 1.6 cm x 1.2 cm 10:00 right breast lesion. Records reviewed and summarized above. Pathology report(s) reviewed above. Radiology report(s) reviewed above. Assessment/plan:   1. Right breast IDC, gr 3, triple negative, cT1c cN0 cM0, stage IA, prognostic stage IB    · 5/12/21 right breast lumpectomy: IDC with medullary features, 1.9 cm, gr 3, 0/1 LN, ER negative, MO negative, HER 2 negative at IHC 0, pT1c pN0 cM0    We explained to the patient that the goal of systemic adjuvant therapy is to improve the chances for cure and decrease the risk of relapse. We explained why a patient can have microscopic cancer spread now even though physical examination, laboratory studies and imaging studies are negative for cancer. We explained that the same treatments used now as adjuvant or preventive treatments rarely if ever are curative in women who develop metastases. Using eprognosis. org, her 5 year all cause mortality risk is 9-15%; her 10 year all cause mortality risk is 34-43%; her median OS is 12-14 years. An adjuvant discussion is warranted. No falls in past 6 months    Discussed IV CMF q 3 weeks x 6.  CMF (methotrexate 40 mg/m2, cyclophosphamide 600 mg/m2, fluorouracil 600 mg/m2).  Side effects discussed including, but not limited to, fatigue, myelosuppression, diarrhea, nausea, vomiting, mouth sores, and possible alopecia, and a possible, but very rare, severe allergic reaction to 5-FU.     Discussed increased risk of death with COVID19 and precautions to take with chemotherapy. Received COVID vaccination on 5/19/21     Discussed cold caps, she declines    After this discussion, she is agreeable to CMF, and signed informed consent. Port was placed with Dr. Swapnil Mata. CMF #3 today. Tolerating well. Labs reviewed. Rx:  Zofran, compazine, emla cream    2. Emotional well being:  She has excellent support and is coping well with her disease    3.  Osteoporosis:  On fosamax and vit D 50,000 units weekly    4. Osteoarthritis: She has been off Diclofenac due to chemotherapy. Previously taking twice daily. Tried Aleve PRN, but doesn't feel this is helping. Will need to stay off of the diclofenac while on CMF. 5. Anemia:  Mild, due to chemo. 6. Mood Swings: she is on lexapro; discussed support groups and counseling. She declines any changes at this time. Will decrease dex from 12 mg to 6 mg    This patient was seen in conjunction with Jl Lopez NP. I personally reviewed the history and all points in the assessment and plan, and performed key points on the exam. Right TN breast cancer, on CMF, continue with #3 today, labs reviewed. RTC 3 weeks        Signed By: Kaveh Camacho MD      No orders of the defined types were placed in this encounter.

## 2021-07-27 ENCOUNTER — OFFICE VISIT (OUTPATIENT)
Dept: ONCOLOGY | Age: 76
End: 2021-07-27
Payer: MEDICARE

## 2021-07-27 ENCOUNTER — HOSPITAL ENCOUNTER (OUTPATIENT)
Dept: INFUSION THERAPY | Age: 76
Discharge: HOME | End: 2021-07-27
Payer: MEDICARE

## 2021-07-27 VITALS
HEART RATE: 68 BPM | SYSTOLIC BLOOD PRESSURE: 124 MMHG | RESPIRATION RATE: 16 BRPM | BODY MASS INDEX: 24.74 KG/M2 | DIASTOLIC BLOOD PRESSURE: 56 MMHG | TEMPERATURE: 97 F | OXYGEN SATURATION: 98 % | WEIGHT: 148.5 LBS | HEIGHT: 65 IN

## 2021-07-27 VITALS
RESPIRATION RATE: 18 BRPM | HEIGHT: 65 IN | TEMPERATURE: 97 F | DIASTOLIC BLOOD PRESSURE: 54 MMHG | WEIGHT: 148 LBS | HEART RATE: 70 BPM | SYSTOLIC BLOOD PRESSURE: 138 MMHG | OXYGEN SATURATION: 98 % | BODY MASS INDEX: 24.66 KG/M2

## 2021-07-27 DIAGNOSIS — C50.411 MALIGNANT NEOPLASM OF UPPER-OUTER QUADRANT OF RIGHT BREAST IN FEMALE, ESTROGEN RECEPTOR NEGATIVE (HCC): Primary | ICD-10-CM

## 2021-07-27 DIAGNOSIS — Z17.1 MALIGNANT NEOPLASM OF UPPER-OUTER QUADRANT OF RIGHT BREAST IN FEMALE, ESTROGEN RECEPTOR NEGATIVE (HCC): Primary | ICD-10-CM

## 2021-07-27 DIAGNOSIS — M81.0 OSTEOPOROSIS WITHOUT CURRENT PATHOLOGICAL FRACTURE, UNSPECIFIED OSTEOPOROSIS TYPE: ICD-10-CM

## 2021-07-27 DIAGNOSIS — Z51.11 ENCOUNTER FOR ANTINEOPLASTIC CHEMOTHERAPY: ICD-10-CM

## 2021-07-27 LAB
ALBUMIN SERPL-MCNC: 3.4 G/DL (ref 3.5–5)
ALBUMIN/GLOB SERPL: 1.2 {RATIO} (ref 1.1–2.2)
ALP SERPL-CCNC: 96 U/L (ref 45–117)
ALT SERPL-CCNC: 20 U/L (ref 12–78)
ANION GAP SERPL CALC-SCNC: 4 MMOL/L (ref 5–15)
AST SERPL-CCNC: 24 U/L (ref 15–37)
BASOPHILS # BLD: 0 K/UL (ref 0–0.1)
BASOPHILS NFR BLD: 1 % (ref 0–1)
BILIRUB SERPL-MCNC: 0.5 MG/DL (ref 0.2–1)
BUN SERPL-MCNC: 13 MG/DL (ref 6–20)
BUN/CREAT SERPL: 23 (ref 12–20)
CALCIUM SERPL-MCNC: 8.6 MG/DL (ref 8.5–10.1)
CHLORIDE SERPL-SCNC: 106 MMOL/L (ref 97–108)
CO2 SERPL-SCNC: 29 MMOL/L (ref 21–32)
COMMENT, HOLDF: NORMAL
CREAT SERPL-MCNC: 0.57 MG/DL (ref 0.55–1.02)
DIFFERENTIAL METHOD BLD: ABNORMAL
EOSINOPHIL # BLD: 0.2 K/UL (ref 0–0.4)
EOSINOPHIL NFR BLD: 6 % (ref 0–7)
ERYTHROCYTE [DISTWIDTH] IN BLOOD BY AUTOMATED COUNT: 14.3 % (ref 11.5–14.5)
GLOBULIN SER CALC-MCNC: 2.9 G/DL (ref 2–4)
GLUCOSE SERPL-MCNC: 77 MG/DL (ref 65–100)
HCT VFR BLD AUTO: 34.9 % (ref 35–47)
HGB BLD-MCNC: 11.1 G/DL (ref 11.5–16)
IMM GRANULOCYTES # BLD AUTO: 0.2 K/UL (ref 0–0.04)
IMM GRANULOCYTES NFR BLD AUTO: 5 % (ref 0–0.5)
LYMPHOCYTES # BLD: 0.8 K/UL (ref 0.8–3.5)
LYMPHOCYTES NFR BLD: 22 % (ref 12–49)
MCH RBC QN AUTO: 30.4 PG (ref 26–34)
MCHC RBC AUTO-ENTMCNC: 31.8 G/DL (ref 30–36.5)
MCV RBC AUTO: 95.6 FL (ref 80–99)
MONOCYTES # BLD: 0.9 K/UL (ref 0–1)
MONOCYTES NFR BLD: 24 % (ref 5–13)
NEUTS SEG # BLD: 1.7 K/UL (ref 1.8–8)
NEUTS SEG NFR BLD: 42 % (ref 32–75)
NRBC # BLD: 0 K/UL (ref 0–0.01)
NRBC BLD-RTO: 0 PER 100 WBC
PLATELET # BLD AUTO: 301 K/UL (ref 150–400)
PMV BLD AUTO: 9.5 FL (ref 8.9–12.9)
POTASSIUM SERPL-SCNC: 3.8 MMOL/L (ref 3.5–5.1)
PROT SERPL-MCNC: 6.3 G/DL (ref 6.4–8.2)
RBC # BLD AUTO: 3.65 M/UL (ref 3.8–5.2)
RBC MORPH BLD: ABNORMAL
SAMPLES BEING HELD,HOLD: NORMAL
SODIUM SERPL-SCNC: 139 MMOL/L (ref 136–145)
WBC # BLD AUTO: 3.8 K/UL (ref 3.6–11)

## 2021-07-27 PROCEDURE — 74011000258 HC RX REV CODE- 258: Performed by: INTERNAL MEDICINE

## 2021-07-27 PROCEDURE — 96413 CHEMO IV INFUSION 1 HR: CPT

## 2021-07-27 PROCEDURE — G8427 DOCREV CUR MEDS BY ELIG CLIN: HCPCS | Performed by: INTERNAL MEDICINE

## 2021-07-27 PROCEDURE — 74011250636 HC RX REV CODE- 250/636: Performed by: NURSE PRACTITIONER

## 2021-07-27 PROCEDURE — 74011250636 HC RX REV CODE- 250/636: Performed by: INTERNAL MEDICINE

## 2021-07-27 PROCEDURE — 77030012965 HC NDL HUBR BBMI -A

## 2021-07-27 PROCEDURE — 96411 CHEMO IV PUSH ADDL DRUG: CPT

## 2021-07-27 PROCEDURE — G8432 DEP SCR NOT DOC, RNG: HCPCS | Performed by: INTERNAL MEDICINE

## 2021-07-27 PROCEDURE — 1090F PRES/ABSN URINE INCON ASSESS: CPT | Performed by: INTERNAL MEDICINE

## 2021-07-27 PROCEDURE — G8536 NO DOC ELDER MAL SCRN: HCPCS | Performed by: INTERNAL MEDICINE

## 2021-07-27 PROCEDURE — 36415 COLL VENOUS BLD VENIPUNCTURE: CPT

## 2021-07-27 PROCEDURE — G0463 HOSPITAL OUTPT CLINIC VISIT: HCPCS | Performed by: INTERNAL MEDICINE

## 2021-07-27 PROCEDURE — 96375 TX/PRO/DX INJ NEW DRUG ADDON: CPT

## 2021-07-27 PROCEDURE — 96367 TX/PROPH/DG ADDL SEQ IV INF: CPT

## 2021-07-27 PROCEDURE — G8420 CALC BMI NORM PARAMETERS: HCPCS | Performed by: INTERNAL MEDICINE

## 2021-07-27 PROCEDURE — 1101F PT FALLS ASSESS-DOCD LE1/YR: CPT | Performed by: INTERNAL MEDICINE

## 2021-07-27 PROCEDURE — 99215 OFFICE O/P EST HI 40 MIN: CPT | Performed by: INTERNAL MEDICINE

## 2021-07-27 PROCEDURE — 85025 COMPLETE CBC W/AUTO DIFF WBC: CPT

## 2021-07-27 PROCEDURE — 80053 COMPREHEN METABOLIC PANEL: CPT

## 2021-07-27 RX ORDER — HEPARIN 100 UNIT/ML
300-500 SYRINGE INTRAVENOUS AS NEEDED
Status: ACTIVE | OUTPATIENT
Start: 2021-07-27 | End: 2021-07-27

## 2021-07-27 RX ORDER — DEXAMETHASONE SODIUM PHOSPHATE 4 MG/ML
6 INJECTION, SOLUTION INTRA-ARTICULAR; INTRALESIONAL; INTRAMUSCULAR; INTRAVENOUS; SOFT TISSUE ONCE
Status: COMPLETED | OUTPATIENT
Start: 2021-07-27 | End: 2021-07-27

## 2021-07-27 RX ORDER — SODIUM CHLORIDE 9 MG/ML
25 INJECTION, SOLUTION INTRAVENOUS CONTINUOUS
Status: DISPENSED | OUTPATIENT
Start: 2021-07-27 | End: 2021-07-27

## 2021-07-27 RX ORDER — SODIUM CHLORIDE 9 MG/ML
10 INJECTION INTRAMUSCULAR; INTRAVENOUS; SUBCUTANEOUS AS NEEDED
Status: ACTIVE | OUTPATIENT
Start: 2021-07-27 | End: 2021-07-27

## 2021-07-27 RX ORDER — METHOTREXATE 25 MG/ML
40 INJECTION, SOLUTION INTRA-ARTERIAL; INTRAMUSCULAR; INTRAVENOUS ONCE
Status: COMPLETED | OUTPATIENT
Start: 2021-07-27 | End: 2021-07-27

## 2021-07-27 RX ORDER — FLUOROURACIL 50 MG/ML
1000 INJECTION, SOLUTION INTRAVENOUS ONCE
Status: COMPLETED | OUTPATIENT
Start: 2021-07-27 | End: 2021-07-27

## 2021-07-27 RX ORDER — SODIUM CHLORIDE 0.9 % (FLUSH) 0.9 %
10 SYRINGE (ML) INJECTION AS NEEDED
Status: DISPENSED | OUTPATIENT
Start: 2021-07-27 | End: 2021-07-27

## 2021-07-27 RX ORDER — PALONOSETRON 0.05 MG/ML
0.25 INJECTION, SOLUTION INTRAVENOUS ONCE
Status: COMPLETED | OUTPATIENT
Start: 2021-07-27 | End: 2021-07-27

## 2021-07-27 RX ADMIN — SODIUM CHLORIDE 25 ML/HR: 9 INJECTION, SOLUTION INTRAVENOUS at 09:37

## 2021-07-27 RX ADMIN — DEXAMETHASONE SODIUM PHOSPHATE 6 MG: 4 INJECTION INTRA-ARTICULAR; INTRALESIONAL; INTRAMUSCULAR; INTRAVENOUS; SOFT TISSUE at 09:45

## 2021-07-27 RX ADMIN — SODIUM CHLORIDE 10 ML: 9 INJECTION INTRAMUSCULAR; INTRAVENOUS; SUBCUTANEOUS at 08:00

## 2021-07-27 RX ADMIN — CYCLOPHOSPHAMIDE 1000 MG: 500 INJECTION, POWDER, FOR SOLUTION INTRAVENOUS; ORAL at 11:09

## 2021-07-27 RX ADMIN — SODIUM CHLORIDE 10 ML: 9 INJECTION INTRAMUSCULAR; INTRAVENOUS; SUBCUTANEOUS at 12:22

## 2021-07-27 RX ADMIN — SODIUM CHLORIDE 10 ML: 9 INJECTION INTRAMUSCULAR; INTRAVENOUS; SUBCUTANEOUS at 08:01

## 2021-07-27 RX ADMIN — FOSAPREPITANT 150 MG: 150 INJECTION, POWDER, LYOPHILIZED, FOR SOLUTION INTRAVENOUS at 09:42

## 2021-07-27 RX ADMIN — METHOTREXATE 70 MG: 25 INJECTION, SOLUTION INTRA-ARTERIAL; INTRAMUSCULAR; INTRAVENOUS at 10:56

## 2021-07-27 RX ADMIN — PALONOSETRON 0.25 MG: 0.05 INJECTION, SOLUTION INTRAVENOUS at 09:45

## 2021-07-27 RX ADMIN — FLUOROURACIL 1000 MG: 50 INJECTION, SOLUTION INTRAVENOUS at 12:20

## 2021-07-27 RX ADMIN — Medication 10 ML: at 08:02

## 2021-07-27 RX ADMIN — Medication 500 UNITS: at 12:22

## 2021-07-27 NOTE — PROGRESS NOTES
[facility] - Chemotherapy Progress Note    0740 Pt admit to [facility] for C 3 CMF ambulatory in stable condition. Assessment completed. No new concerns voiced. PAC with positive blood return. Chemotherapy Flowsheet 7/27/2021   Cycle C 3 D 1   Date 7/27/2021   Drug / Regimen CMF   Pre Meds -   Notes -       Visit Vitals  Pulse (P) 70   Temp (P) 97 °F (36.1 °C)   Resp (P) 18   Ht (P) 5' 5\" (1.651 m)   Wt (P) 67.4 kg (148 lb 8 oz)   LMP 11/15/1990 (Exact Date)   SpO2 (P) 98%   Breastfeeding No   BMI (P) 24.71 kg/m²     Pt declines pregnancy    1. Do you have any symptoms of COVID-19? SOB, coughing, fever, or generally not feeling well No  2. Have you been exposed to COVID-19 recently? No  3. Have you had any recent contact with family/friend that has a pending COVID test?No    Labs obtained and sent for processing. Pt proceeded to scheduled MD appointment accompanied by daughter Husam Prom    Pt returned- no change to treatment.   Medications:  Medications Administered     0.9% sodium chloride infusion     Admin Date  07/27/2021 Action  New Bag Dose  25 mL/hr Rate  25 mL/hr Route  IntraVENous Administered By  Jailyn Morocho RN          0.9% sodium chloride injection 10 mL     Admin Date  07/27/2021 Action  Given Dose  10 mL Route  IntraVENous Administered By  Jailyn Morocho, [address] [facility] Date  07/27/2021 Action  Given Dose  10 mL Route  IntraVENous Administered By  Jailyn Morocho, [address] [place] [address] Date  07/27/2021 Action  Given Dose  10 mL Route  IntraVENous Administered By  Jailyn Morocho RN          cyclophosphamide (CYTOXAN) 1,000 mg in 0.9% sodium chloride 250 mL, overfill volume 25 mL chemo infusion     Admin Date  07/27/2021 Action  New Bag Dose  1,000 mg Rate  650 mL/hr Route  IntraVENous Administered By  Jailyn Morocho RN          dexamethasone (DECADRON) 4 mg/mL injection 6 mg     Admin Date  07/27/2021 Action  Given Dose  6 mg Route  IntraVENous Administered By  Jailyn Morocho RN fluorouraciL (ADRUCIL) chemo syringe 1,000 mg     Admin Date  07/27/2021 Action  Given Dose  1,000 mg Rate  300 mL/hr Route  IntraVENous Administered By  Dino Caba RN          fosaprepitant (EMEND) 150 mg in 0.9% sodium chloride 150 mL IVPB     Admin Date  07/27/2021 Action  New Bag Dose  150 mg Rate  450 mL/hr Route  IntraVENous Administered By  Dino Caba RN          heparin (porcine) pf 300-500 Units     Admin Date  07/27/2021 Action  Given Dose  500 Units Route  InterCATHeter Administered By  Dino Caba RN          methotrexate chemo syringe 70 mg     Admin Date  07/27/2021 Action  Given Dose  70 mg Route  IntraVENous Administered By  Dino Caba RN          palonosetron HCl (ALOXI) injection 0.25 mg     Admin Date  07/27/2021 Action  Given Dose  0.25 mg Route  IntraVENous Administered By  Dino Caba RN          sodium chloride (NS) flush 10 mL     Admin Date  07/27/2021 Action  Given Dose  10 mL Route  IntraVENous Administered By  Dino Caba, RN                1240 Pt tolerated treatment well. PACmaintained positive blood return throughout treatment, flushed with positive blood return at conclusion and throughout treatment. D/c home ambulatory in no distress. Pt aware of next appointment scheduled for *8/17/21. Recent Results (from the past 12 hour(s))   METABOLIC PANEL, COMPREHENSIVE    Collection Time: 07/27/21  8:00 AM   Result Value Ref Range    Sodium 139 136 - 145 mmol/L    Potassium 3.8 3.5 - 5.1 mmol/L    Chloride 106 97 - 108 mmol/L    CO2 29 21 - 32 mmol/L    Anion gap 4 (L) 5 - 15 mmol/L    Glucose 77 65 - 100 mg/dL    BUN 13 6 - 20 MG/DL    Creatinine 0.57 0.55 - 1.02 MG/DL    BUN/Creatinine ratio 23 (H) 12 - 20      GFR est AA >60 >60 ml/min/1.73m2    GFR est non-AA >60 >60 ml/min/1.73m2    Calcium 8.6 8.5 - 10.1 MG/DL    Bilirubin, total 0.5 0.2 - 1.0 MG/DL    ALT (SGPT) 20 12 - 78 U/L    AST (SGOT) 24 15 - 37 U/L    Alk.  phosphatase 96 45 - 117 U/L    Protein, total 6.3 (L) 6.4 - 8.2 g/dL    Albumin 3.4 (L) 3.5 - 5.0 g/dL    Globulin 2.9 2.0 - 4.0 g/dL    A-G Ratio 1.2 1.1 - 2.2     CBC WITH AUTOMATED DIFF    Collection Time: 07/27/21  8:00 AM   Result Value Ref Range    WBC 3.8 3.6 - 11.0 K/uL    RBC 3.65 (L) 3.80 - 5.20 M/uL    HGB 11.1 (L) 11.5 - 16.0 g/dL    HCT 34.9 (L) 35.0 - 47.0 %    MCV 95.6 80.0 - 99.0 FL    MCH 30.4 26.0 - 34.0 PG    MCHC 31.8 30.0 - 36.5 g/dL    RDW 14.3 11.5 - 14.5 %    PLATELET 981 429 - 017 K/uL    MPV 9.5 8.9 - 12.9 FL    NRBC 0.0 0  WBC    ABSOLUTE NRBC 0.00 0.00 - 0.01 K/uL    NEUTROPHILS 42 32 - 75 %    LYMPHOCYTES 22 12 - 49 %    MONOCYTES 24 (H) 5 - 13 %    EOSINOPHILS 6 0 - 7 %    BASOPHILS 1 0 - 1 %    IMMATURE GRANULOCYTES 5 (H) 0.0 - 0.5 %    ABS. NEUTROPHILS 1.7 (L) 1.8 - 8.0 K/UL    ABS. LYMPHOCYTES 0.8 0.8 - 3.5 K/UL    ABS. MONOCYTES 0.9 0.0 - 1.0 K/UL    ABS. EOSINOPHILS 0.2 0.0 - 0.4 K/UL    ABS. BASOPHILS 0.0 0.0 - 0.1 K/UL    ABS. IMM. GRANS. 0.2 (H) 0.00 - 0.04 K/UL    DF SMEAR SCANNED      RBC COMMENTS NORMOCYTIC, NORMOCHROMIC     SAMPLES BEING HELD    Collection Time: 07/27/21  8:00 AM   Result Value Ref Range    SAMPLES BEING HELD 1SST     COMMENT        Add-on orders for these samples will be processed based on acceptable specimen integrity and analyte stability, which may vary by analyte.

## 2021-07-27 NOTE — PROGRESS NOTES
Rosa Nowak is a 64 y.o. female    Chief Complaint   Patient presents with    Follow-up    Chemotherapy    Breast Cancer       Health Maintenance Due   Topic Date Due    Hepatitis C Screening  Never done    DTaP/Tdap/Td series (1 - Tdap) Never done    Shingrix Vaccine Age 50> (1 of 2) Never done    Pneumococcal 65+ years (2 of 2 - PPSV23) 05/04/2008    Medicare Yearly Exam  03/14/2018     Vitals 7/27/2021   Pulse 70   Temp 97   Resp 18   Height 5' 5\"   Weight 148 lb 8 oz   SpO2 98   BSA 1.76 m2   BMI 24.71 kg/m2     Visit Vitals  BP (!) 138/54 (BP 1 Location: Left upper arm, BP Patient Position: Sitting)   Pulse 70   Temp 97 °F (36.1 °C)   Resp 18   Ht 5' 5\" (1.651 m)   Wt 148 lb (67.1 kg)   LMP 11/15/1990 (Exact Date)   SpO2 98%   BMI 24.63 kg/m²       3 most recent PHQ Screens 7/6/2021   Little interest or pleasure in doing things Not at all   Feeling down, depressed, irritable, or hopeless Not at all   Total Score PHQ 2 0       Fall Risk Assessment, last 12 mths 7/27/2021   Able to walk? Yes   Fall in past 12 months? 0   Do you feel unsteady? 0   Are you worried about falling 0   Is TUG test greater than 12 seconds? -   Is the gait abnormal? -   Number of falls in past 12 months -   Fall with injury? -       No flowsheet data found. 1. Have you been to the ER, urgent care clinic since your last visit? Hospitalized since your last visit?no    2. Have you seen or consulted any other health care providers outside of the [address] [place] [address] since your last visit? Include any pap smears or colon screening. yes PCP Dr Coco Krause

## 2021-08-11 RX ORDER — DEXAMETHASONE SODIUM PHOSPHATE 4 MG/ML
6 INJECTION, SOLUTION INTRA-ARTICULAR; INTRALESIONAL; INTRAMUSCULAR; INTRAVENOUS; SOFT TISSUE ONCE
Status: CANCELLED
Start: 2021-08-17 | End: 2021-08-17

## 2021-08-11 RX ORDER — DEXAMETHASONE SODIUM PHOSPHATE 4 MG/ML
6 INJECTION, SOLUTION INTRA-ARTICULAR; INTRALESIONAL; INTRAMUSCULAR; INTRAVENOUS; SOFT TISSUE ONCE
Status: CANCELLED
Start: 2021-09-28 | End: 2021-09-28

## 2021-08-11 RX ORDER — DEXAMETHASONE SODIUM PHOSPHATE 4 MG/ML
6 INJECTION, SOLUTION INTRA-ARTICULAR; INTRALESIONAL; INTRAMUSCULAR; INTRAVENOUS; SOFT TISSUE ONCE
Status: CANCELLED
Start: 2021-09-07 | End: 2021-09-07

## 2021-08-16 NOTE — PROGRESS NOTES
Maura Samuel is a 64 y.o. female here for follow up for right breast cancer. Treatment today:  Cycle 4 Day 1 CMF IV    1. Have you been to the ER, urgent care clinic since your last visit? Hospitalized since your last visit? no    2. Have you seen or consulted any other health care providers outside of the [address] [place] [address] since your last visit? Include any pap smears or colon screening. no    Pt states she has had no nausea. Has her ups and downs. No complaints.

## 2021-08-17 ENCOUNTER — OFFICE VISIT (OUTPATIENT)
Dept: ONCOLOGY | Age: 76
End: 2021-08-17
Payer: MEDICARE

## 2021-08-17 ENCOUNTER — HOSPITAL ENCOUNTER (OUTPATIENT)
Dept: INFUSION THERAPY | Age: 76
Discharge: HOME | End: 2021-08-17
Payer: MEDICARE

## 2021-08-17 VITALS
RESPIRATION RATE: 18 BRPM | HEIGHT: 65 IN | HEART RATE: 70 BPM | WEIGHT: 145 LBS | TEMPERATURE: 97.3 F | BODY MASS INDEX: 24.16 KG/M2 | OXYGEN SATURATION: 98 % | SYSTOLIC BLOOD PRESSURE: 120 MMHG | DIASTOLIC BLOOD PRESSURE: 58 MMHG

## 2021-08-17 VITALS
DIASTOLIC BLOOD PRESSURE: 67 MMHG | RESPIRATION RATE: 18 BRPM | OXYGEN SATURATION: 98 % | BODY MASS INDEX: 24.16 KG/M2 | SYSTOLIC BLOOD PRESSURE: 120 MMHG | WEIGHT: 145 LBS | TEMPERATURE: 97.7 F | HEART RATE: 57 BPM | HEIGHT: 65 IN

## 2021-08-17 DIAGNOSIS — Z17.1 MALIGNANT NEOPLASM OF UPPER-OUTER QUADRANT OF RIGHT BREAST IN FEMALE, ESTROGEN RECEPTOR NEGATIVE (HCC): Primary | ICD-10-CM

## 2021-08-17 DIAGNOSIS — C50.411 MALIGNANT NEOPLASM OF UPPER-OUTER QUADRANT OF RIGHT BREAST IN FEMALE, ESTROGEN RECEPTOR NEGATIVE (HCC): Primary | ICD-10-CM

## 2021-08-17 LAB
ALBUMIN SERPL-MCNC: 3.5 G/DL (ref 3.5–5)
ALBUMIN/GLOB SERPL: 1.2 {RATIO} (ref 1.1–2.2)
ALP SERPL-CCNC: 100 U/L (ref 45–117)
ALT SERPL-CCNC: 17 U/L (ref 12–78)
ANION GAP SERPL CALC-SCNC: 4 MMOL/L (ref 5–15)
AST SERPL-CCNC: 20 U/L (ref 15–37)
BASOPHILS # BLD: 0 K/UL (ref 0–0.1)
BASOPHILS NFR BLD: 1 % (ref 0–1)
BILIRUB SERPL-MCNC: 0.4 MG/DL (ref 0.2–1)
BUN SERPL-MCNC: 13 MG/DL (ref 6–20)
BUN/CREAT SERPL: 21 (ref 12–20)
CALCIUM SERPL-MCNC: 8.6 MG/DL (ref 8.5–10.1)
CHLORIDE SERPL-SCNC: 106 MMOL/L (ref 97–108)
CO2 SERPL-SCNC: 30 MMOL/L (ref 21–32)
CREAT SERPL-MCNC: 0.61 MG/DL (ref 0.55–1.02)
DIFFERENTIAL METHOD BLD: ABNORMAL
EOSINOPHIL # BLD: 0.3 K/UL (ref 0–0.4)
EOSINOPHIL NFR BLD: 8 % (ref 0–7)
ERYTHROCYTE [DISTWIDTH] IN BLOOD BY AUTOMATED COUNT: 14.6 % (ref 11.5–14.5)
GLOBULIN SER CALC-MCNC: 2.9 G/DL (ref 2–4)
GLUCOSE SERPL-MCNC: 86 MG/DL (ref 65–100)
HCT VFR BLD AUTO: 36.6 % (ref 35–47)
HGB BLD-MCNC: 11.6 G/DL (ref 11.5–16)
IMM GRANULOCYTES # BLD AUTO: 0.2 K/UL (ref 0–0.04)
IMM GRANULOCYTES NFR BLD AUTO: 5 % (ref 0–0.5)
LYMPHOCYTES # BLD: 0.7 K/UL (ref 0.8–3.5)
LYMPHOCYTES NFR BLD: 20 % (ref 12–49)
MCH RBC QN AUTO: 30.9 PG (ref 26–34)
MCHC RBC AUTO-ENTMCNC: 31.7 G/DL (ref 30–36.5)
MCV RBC AUTO: 97.3 FL (ref 80–99)
MONOCYTES # BLD: 0.8 K/UL (ref 0–1)
MONOCYTES NFR BLD: 24 % (ref 5–13)
NEUTS SEG # BLD: 1.4 K/UL (ref 1.8–8)
NEUTS SEG NFR BLD: 42 % (ref 32–75)
NRBC # BLD: 0 K/UL (ref 0–0.01)
NRBC BLD-RTO: 0 PER 100 WBC
PLATELET # BLD AUTO: 280 K/UL (ref 150–400)
PMV BLD AUTO: 9.3 FL (ref 8.9–12.9)
POTASSIUM SERPL-SCNC: 3.8 MMOL/L (ref 3.5–5.1)
PROT SERPL-MCNC: 6.4 G/DL (ref 6.4–8.2)
RBC # BLD AUTO: 3.76 M/UL (ref 3.8–5.2)
RBC MORPH BLD: ABNORMAL
SODIUM SERPL-SCNC: 140 MMOL/L (ref 136–145)
WBC # BLD AUTO: 3.4 K/UL (ref 3.6–11)
WBC MORPH BLD: ABNORMAL

## 2021-08-17 PROCEDURE — 1090F PRES/ABSN URINE INCON ASSESS: CPT | Performed by: INTERNAL MEDICINE

## 2021-08-17 PROCEDURE — 96411 CHEMO IV PUSH ADDL DRUG: CPT

## 2021-08-17 PROCEDURE — 96375 TX/PRO/DX INJ NEW DRUG ADDON: CPT

## 2021-08-17 PROCEDURE — 99215 OFFICE O/P EST HI 40 MIN: CPT | Performed by: INTERNAL MEDICINE

## 2021-08-17 PROCEDURE — 36415 COLL VENOUS BLD VENIPUNCTURE: CPT

## 2021-08-17 PROCEDURE — 74011250636 HC RX REV CODE- 250/636: Performed by: INTERNAL MEDICINE

## 2021-08-17 PROCEDURE — 85025 COMPLETE CBC W/AUTO DIFF WBC: CPT

## 2021-08-17 PROCEDURE — G8399 PT W/DXA RESULTS DOCUMENT: HCPCS | Performed by: INTERNAL MEDICINE

## 2021-08-17 PROCEDURE — G8536 NO DOC ELDER MAL SCRN: HCPCS | Performed by: INTERNAL MEDICINE

## 2021-08-17 PROCEDURE — G0463 HOSPITAL OUTPT CLINIC VISIT: HCPCS | Performed by: INTERNAL MEDICINE

## 2021-08-17 PROCEDURE — 96413 CHEMO IV INFUSION 1 HR: CPT

## 2021-08-17 PROCEDURE — 96367 TX/PROPH/DG ADDL SEQ IV INF: CPT

## 2021-08-17 PROCEDURE — 1101F PT FALLS ASSESS-DOCD LE1/YR: CPT | Performed by: INTERNAL MEDICINE

## 2021-08-17 PROCEDURE — 80053 COMPREHEN METABOLIC PANEL: CPT

## 2021-08-17 PROCEDURE — G8420 CALC BMI NORM PARAMETERS: HCPCS | Performed by: INTERNAL MEDICINE

## 2021-08-17 PROCEDURE — 74011250636 HC RX REV CODE- 250/636: Performed by: NURSE PRACTITIONER

## 2021-08-17 PROCEDURE — 74011000258 HC RX REV CODE- 258: Performed by: INTERNAL MEDICINE

## 2021-08-17 PROCEDURE — 77030016057 HC NDL HUBR APOL -B

## 2021-08-17 PROCEDURE — G8432 DEP SCR NOT DOC, RNG: HCPCS | Performed by: INTERNAL MEDICINE

## 2021-08-17 PROCEDURE — G8427 DOCREV CUR MEDS BY ELIG CLIN: HCPCS | Performed by: INTERNAL MEDICINE

## 2021-08-17 RX ORDER — PALONOSETRON 0.05 MG/ML
0.25 INJECTION, SOLUTION INTRAVENOUS ONCE
Status: COMPLETED | OUTPATIENT
Start: 2021-08-17 | End: 2021-08-17

## 2021-08-17 RX ORDER — SODIUM CHLORIDE 9 MG/ML
25 INJECTION, SOLUTION INTRAVENOUS CONTINUOUS
Status: DISPENSED | OUTPATIENT
Start: 2021-08-17 | End: 2021-08-17

## 2021-08-17 RX ORDER — SODIUM CHLORIDE 0.9 % (FLUSH) 0.9 %
10 SYRINGE (ML) INJECTION AS NEEDED
Status: DISPENSED | OUTPATIENT
Start: 2021-08-17 | End: 2021-08-17

## 2021-08-17 RX ORDER — DEXAMETHASONE SODIUM PHOSPHATE 4 MG/ML
6 INJECTION, SOLUTION INTRA-ARTICULAR; INTRALESIONAL; INTRAMUSCULAR; INTRAVENOUS; SOFT TISSUE ONCE
Status: COMPLETED | OUTPATIENT
Start: 2021-08-17 | End: 2021-08-17

## 2021-08-17 RX ORDER — ESCITALOPRAM OXALATE 20 MG/1
20 TABLET ORAL DAILY
Qty: 30 TABLET | Refills: 3 | Status: SHIPPED | OUTPATIENT
Start: 2021-08-17 | End: 2022-01-07

## 2021-08-17 RX ORDER — METHOTREXATE 25 MG/ML
40 INJECTION, SOLUTION INTRA-ARTERIAL; INTRAMUSCULAR; INTRAVENOUS ONCE
Status: COMPLETED | OUTPATIENT
Start: 2021-08-17 | End: 2021-08-17

## 2021-08-17 RX ORDER — SODIUM CHLORIDE 9 MG/ML
10 INJECTION INTRAMUSCULAR; INTRAVENOUS; SUBCUTANEOUS AS NEEDED
Status: ACTIVE | OUTPATIENT
Start: 2021-08-17 | End: 2021-08-17

## 2021-08-17 RX ORDER — FLUOROURACIL 50 MG/ML
1000 INJECTION, SOLUTION INTRAVENOUS ONCE
Status: COMPLETED | OUTPATIENT
Start: 2021-08-17 | End: 2021-08-17

## 2021-08-17 RX ORDER — HEPARIN 100 UNIT/ML
300-500 SYRINGE INTRAVENOUS AS NEEDED
Status: ACTIVE | OUTPATIENT
Start: 2021-08-17 | End: 2021-08-17

## 2021-08-17 RX ADMIN — METHOTREXATE 70 MG: 25 INJECTION, SOLUTION INTRA-ARTERIAL; INTRAMUSCULAR; INTRAVENOUS at 11:14

## 2021-08-17 RX ADMIN — DEXAMETHASONE SODIUM PHOSPHATE 6 MG: 4 INJECTION INTRA-ARTICULAR; INTRALESIONAL; INTRAMUSCULAR; INTRAVENOUS; SOFT TISSUE at 10:02

## 2021-08-17 RX ADMIN — FLUOROURACIL 1000 MG: 50 INJECTION, SOLUTION INTRAVENOUS at 12:17

## 2021-08-17 RX ADMIN — FOSAPREPITANT 150 MG: 150 INJECTION, POWDER, LYOPHILIZED, FOR SOLUTION INTRAVENOUS at 10:04

## 2021-08-17 RX ADMIN — Medication 10 ML: at 12:22

## 2021-08-17 RX ADMIN — CYCLOPHOSPHAMIDE 1000 MG: 500 INJECTION, POWDER, FOR SOLUTION INTRAVENOUS; ORAL at 11:28

## 2021-08-17 RX ADMIN — SODIUM CHLORIDE 25 ML/HR: 9 INJECTION, SOLUTION INTRAVENOUS at 10:01

## 2021-08-17 RX ADMIN — PALONOSETRON HYDROCHLORIDE 0.25 MG: 0.25 INJECTION, SOLUTION INTRAVENOUS at 10:04

## 2021-08-17 RX ADMIN — Medication 500 UNITS: at 12:22

## 2021-08-17 NOTE — PROGRESS NOTES
[facility] Progress Note    Date: 2021    Name: Shlomo Flood    MRN: [id number]         : 1945    Ms. Jose Alfredo Ramos Arrived ambulatory and in no distress for C4D1 of CMF Regimen. Assessment was completed, no acute issues at this time, no new complaints voiced. Right chest wall port accessed without difficulty, labs drawn & sent for processing. Chemotherapy Flowsheet 2021   Cycle C4D1   Date 2021   Drug / Regimen CMF   Pre Meds given   Notes given        Patient proceed to appointment with Dr. Macho Piña. Ms. Bhagat's vitals were reviewed. Patient Vitals for the past 12 hrs:   Temp Pulse Resp BP SpO2   21 1225 97.7 °F (36.5 °C) (!) 57 18 120/67    21 0756 97.3 °F (36.3 °C) 70 18 (!) 120/58 98 %     Lab results were obtained and reviewed, within parameters for treatment. Recent Results (from the past 12 hour(s))   CBC WITH AUTOMATED DIFF    Collection Time: 21  8:02 AM   Result Value Ref Range    WBC 3.4 (L) 3.6 - 11.0 K/uL    RBC 3.76 (L) 3.80 - 5.20 M/uL    HGB 11.6 11.5 - 16.0 g/dL    HCT 36.6 35.0 - 47.0 %    MCV 97.3 80.0 - 99.0 FL    MCH 30.9 26.0 - 34.0 PG    MCHC 31.7 30.0 - 36.5 g/dL    RDW 14.6 (H) 11.5 - 14.5 %    PLATELET 031 861 - 675 K/uL    MPV 9.3 8.9 - 12.9 FL    NRBC 0.0 0  WBC    ABSOLUTE NRBC 0.00 0.00 - 0.01 K/uL    NEUTROPHILS 42 32 - 75 %    LYMPHOCYTES 20 12 - 49 %    MONOCYTES 24 (H) 5 - 13 %    EOSINOPHILS 8 (H) 0 - 7 %    BASOPHILS 1 0 - 1 %    IMMATURE GRANULOCYTES 5 (H) 0.0 - 0.5 %    ABS. NEUTROPHILS 1.4 (L) 1.8 - 8.0 K/UL    ABS. LYMPHOCYTES 0.7 (L) 0.8 - 3.5 K/UL    ABS. MONOCYTES 0.8 0.0 - 1.0 K/UL    ABS. EOSINOPHILS 0.3 0.0 - 0.4 K/UL    ABS. BASOPHILS 0.0 0.0 - 0.1 K/UL    ABS. IMM.  GRANS. 0.2 (H) 0.00 - 0.04 K/UL    DF SMEAR SCANNED      RBC COMMENTS MACROCYTOSIS  1+        WBC COMMENTS TOXIC GRANULATION     METABOLIC PANEL, COMPREHENSIVE    Collection Time: 21  8:02 AM   Result Value Ref Range    Sodium 140 136 - 145 mmol/L    Potassium 3.8 3.5 - 5.1 mmol/L    Chloride 106 97 - 108 mmol/L    CO2 30 21 - 32 mmol/L    Anion gap 4 (L) 5 - 15 mmol/L    Glucose 86 65 - 100 mg/dL    BUN 13 6 - 20 MG/DL    Creatinine 0.61 0.55 - 1.02 MG/DL    BUN/Creatinine ratio 21 (H) 12 - 20      GFR est AA >60 >60 ml/min/1.73m2    GFR est non-AA >60 >60 ml/min/1.73m2    Calcium 8.6 8.5 - 10.1 MG/DL    Bilirubin, total 0.4 0.2 - 1.0 MG/DL    ALT (SGPT) 17 12 - 78 U/L    AST (SGOT) 20 15 - 37 U/L    Alk.  phosphatase 100 45 - 117 U/L    Protein, total 6.4 6.4 - 8.2 g/dL    Albumin 3.5 3.5 - 5.0 g/dL    Globulin 2.9 2.0 - 4.0 g/dL    A-G Ratio 1.2 1.1 - 2.2         Medications:  Medications Administered     0.9% sodium chloride infusion     Admin Date  08/17/2021 Action  New Bag Dose  25 mL/hr Rate  25 mL/hr Route  IntraVENous Administered By  [place], [place]          cyclophosphamide (CYTOXAN) 1,000 mg in 0.9% sodium chloride 250 mL, overfill volume 25 mL chemo infusion     Admin Date  08/17/2021 Action  New Bag Dose  1,000 mg Rate  650 mL/hr Route  IntraVENous Administered By  [place], [place]          dexamethasone (DECADRON) 4 mg/mL injection 6 mg     Admin Date  08/17/2021 Action  Given Dose  6 mg Route  IntraVENous Administered By  [place], [place]          fluorouraciL (ADRUCIL) chemo syringe 1,000 mg     Admin Date  08/17/2021 Action  Given Dose  1,000 mg Route  IntraVENous Administered By  [place], [place]          fosaprepitant (EMEND) 150 mg in 0.9% sodium chloride 150 mL IVPB     Admin Date  08/17/2021 Action  New Bag Dose  150 mg Rate  450 mL/hr Route  IntraVENous Administered By  [place], [place]          heparin (porcine) pf 300-500 Units     Admin Date  08/17/2021 Action  Given Dose  500 Units Route  InterCATHeter Administered By  [place], [place]          methotrexate chemo syringe 70 mg     Admin Date  08/17/2021 Action  Given Dose  70 mg Route  IntraVENous Administered By  [place], [place]          palonosetron HCl (ALOXI) injection 0.25 mg     Admin Date  08/17/2021 Action  Given Dose  0.25 mg Route  IntraVENous Administered By  [facility] [employer], [place]          sodium chloride (NS) flush 10 mL     Admin Date  08/17/2021 Action  Given Dose  10 mL Route  IntraVENous Administered By  [facility] [employer], [place]                  Ms. Corrie Abarca tolerated treatment well and was discharged from [place] [address] in stable condition at 1230. Port de-accessed, flushed & heparinized per protocol. She is to return on September 7 at 0730 for her next appointment.     [facility]  August 17, 2021

## 2021-08-17 NOTE — PROGRESS NOTES
Cancer [place] at [place] [address] [address] [facility]  Timothy Regalado: [phone]  F: [phone]      Reason for Visit:   Mikel Campbell is a 64 y.o. female who is seen in follow up for evaluation of therapy for breast cancer. Breast surgery: Dr. Jame Mancera    Treatment History:   · 3/12/21 GEJ bx:  Squamocolumnar mucosa with reactive changes  · 4/16/21 right breast 1:00 core bx:  IDC, gr 3, 1.3 cm, gr 3, ER negative, NH negative, HER 2 negative at [facility] 1+  · 5/12/21 right breast lumpectomy: IDC with medullary features, 1.9 cm, gr 3, 0/1 LN, ER negative, NH negative, HER 2 negative at IHC 0, pT1c pN0 cM0  · IV CMF 6/15/21-    History of Present Illness: An abnormal mammogram led to the pathology above    Interval history:  Presents today for follow up and treatment. She reports gr 1 fatigue, gr 1 nausea, gr 1 cognition/concentration, gr 2 anxiety/agiation, gr 1 dyspnea, gr 1 swelling, gr 1 headache, gr 1 urinary frequency, gr 1 urinary incontinence.      reviewed    FH:  Maternal aunt with possible breast cancer at an unknown age; sister with ovarian cancer in her 62s; no prostate or pancreas cancer    Past Medical History:   Diagnosis Date    Allergic rhinitis due to other allergen     Arthritis     Cancer ([address]     right breast    Hypercholesterolemia     Psychiatric disorder     anxiety    Seizures ([address]     \"with stress\" last was 2017      Past Surgical History:   Procedure Laterality Date    HX BREAST LUMPECTOMY Right 5/12/2021    RIGHT BREAST LUMPECTOMY, ABNER CATH INSERTION, RIGHT BREAST SENTINEL NODE BIOPSY performed by Gerson Calabrese MD at [place] [address] HX COLONOSCOPY  2021    HX SALPINGO-OOPHORECTOMY Left     at about age 26      Social History     Tobacco Use    Smoking status: Never Smoker    Smokeless tobacco: Never Used   Substance Use Topics    Alcohol use: No      Family History   Problem Relation Age of Onset    Diabetes Mother  Cancer Sister     Cancer Brother     Breast Cancer Maternal Aunt      Current Outpatient Medications   Medication Sig    lidocaine-prilocaine (EMLA) topical cream Apply  to affected area as needed for Pain.  prochlorperazine (COMPAZINE) 10 mg tablet Take 1 Tablet by mouth every six (6) hours as needed for Nausea.  ondansetron hcl (ZOFRAN) 8 mg tablet Take 1 Tablet by mouth every eight (8) hours as needed for Nausea.  escitalopram oxalate (LEXAPRO) 10 mg tablet Take 10 mg by mouth daily. Indications: anxiousness associated with depression    alendronate (FOSAMAX) 35 mg tablet Take 70 mg by mouth every seven (7) days. mondays    timoloL (BETIMOL) 0.25 % ophthalmic solution Administer 1-2 Drops to both eyes two (2) times a day. use in affected eye(s)    latanoprost (XALATAN) 0.005 % ophthalmic solution Administer 1 Drop to both eyes daily. No current facility-administered medications for this visit. Allergies   Allergen Reactions    Codeine Other (comments)     Other reaction(s): Other (see comments)  Hallucinations        Review of Systems: A complete review of systems was obtained, negative except as described above.     Physical Exam:     Visit Vitals  BP (!) 120/58   Pulse 70   Temp 97.3 °F (36.3 °C)   Resp 18   Ht 5' 5\" (1.651 m)   Wt 145 lb (65.8 kg)   LMP 11/15/1990 (Exact Date)   SpO2 98%   BMI 24.13 kg/m²     ECOG PS: 0    Constitutional: Appears well-developed and well-nourished in no apparent distress      Mental status: Alert and awake, Oriented to person/place/time, Able to follow commands    Eyes: EOM normal, Sclera normal, No visible ocular discharge    HENT: Normocephalic, atraumatic    Mouth/Throat: Moist mucous membranes    External Ears normal    Neck: No visualized mass    Pulmonary/Chest: Respiratory effort normal, No visualized signs of difficulty breathing or respiratory distress    Musculoskeletal: Normal gait with no signs of ataxia, Normal range of motion of neck    Neurological: No facial asymmetry (Cranial nerve 7 motor function), No gaze palsy    Skin: No significant exanthematous lesions or discoloration noted on facial skin    Psychiatric: Normal affect, No hallucinations           Results:     Lab Results   Component Value Date/Time    WBC 3.8 07/27/2021 08:00 AM    HGB 11.1 (L) 07/27/2021 08:00 AM    HCT 34.9 (L) 07/27/2021 08:00 AM    PLATELET 497 96/75/2864 08:00 AM    MCV 95.6 07/27/2021 08:00 AM    ABS. NEUTROPHILS 1.7 (L) 07/27/2021 08:00 AM     Lab Results   Component Value Date/Time    Sodium 139 07/27/2021 08:00 AM    Potassium 3.8 07/27/2021 08:00 AM    Chloride 106 07/27/2021 08:00 AM    CO2 29 07/27/2021 08:00 AM    Glucose 77 07/27/2021 08:00 AM    BUN 13 07/27/2021 08:00 AM    Creatinine 0.57 07/27/2021 08:00 AM    GFR est AA >60 07/27/2021 08:00 AM    GFR est non-AA >60 07/27/2021 08:00 AM    Calcium 8.6 07/27/2021 08:00 AM     Lab Results   Component Value Date/Time    Bilirubin, total 0.5 07/27/2021 08:00 AM    ALT (SGPT) 20 07/27/2021 08:00 AM    Alk. phosphatase 96 07/27/2021 08:00 AM    Protein, total 6.3 (L) 07/27/2021 08:00 AM    Albumin 3.4 (L) 07/27/2021 08:00 AM    Globulin 2.9 07/27/2021 08:00 AM     3/30/21 dexa     This patient is osteoporotic using the World Health Organization criteria  As compared to the prior study, there has been a significant 5.6% increase in  the left total hip and a significant 9% increase in the right total hip    4/12/21 right breast US  IMPRESSION should read \"BI-RADS 4. Suspicious abnormality. Recommend histologic  correlation. 1.8 cm x 1.6 cm x 1.2 cm 10:00 right breast lesion. Records reviewed and summarized above. Pathology report(s) reviewed above. Radiology report(s) reviewed above. Assessment/plan:   1.  Right breast IDC, gr 3, triple negative, cT1c cN0 cM0, stage IA, prognostic stage IB    · 5/12/21 right breast lumpectomy: IDC with medullary features, 1.9 cm, gr 3, 0/1 LN, ER negative, LA negative, HER 2 negative at IHC 0, pT1c pN0 cM0    We explained to the patient that the goal of systemic adjuvant therapy is to improve the chances for cure and decrease the risk of relapse. We explained why a patient can have microscopic cancer spread now even though physical examination, laboratory studies and imaging studies are negative for cancer. We explained that the same treatments used now as adjuvant or preventive treatments rarely if ever are curative in women who develop metastases. Using eprognosis. org, her 5 year all cause mortality risk is 9-15%; her 10 year all cause mortality risk is 34-43%; her median OS is 12-14 years. An adjuvant discussion is warranted. No falls in past 6 months    Discussed IV CMF q 3 weeks x 6.  CMF (methotrexate 40 mg/m2, cyclophosphamide 600 mg/m2, fluorouracil 600 mg/m2).  Side effects discussed including, but not limited to, fatigue, myelosuppression, diarrhea, nausea, vomiting, mouth sores, and possible alopecia, and a possible, but very rare, severe allergic reaction to 5-FU.     Discussed increased risk of death with COVID19 and precautions to take with chemotherapy. Received COVID vaccination on 5/19/21     Discussed cold caps, she declines    After this discussion, she is agreeable to CMF, and signed informed consent. Port was placed with Dr. Shelly Hines. CMF #4 today. Tolerating well. Labs reviewed. We will plan to see the patient in follow up at least once per cycle, or sooner if symptoms warrant. Labs with each cycle to monitor for toxicity    Rx:  Zofran, compazine, emla cream    2. Emotional well being:  She has excellent support and is coping well with her disease    3. Osteoporosis:  On fosamax and vit D 50,000 units weekly    4. Osteoarthritis: She has been off Diclofenac due to chemotherapy. Previously taking twice daily. Tried Aleve PRN, but doesn't feel this is helping. Will need to stay off of the diclofenac while on CMF.     5. Anemia:  Mild, due to chemo. 6. Mood Swings: she is on lexapro 10 mg daily; discussed support groups and counseling. Have decreased dex to 6 mg. Will increase lexapro to 20 mg daily    Signed By: Soraya Blandon MD      No orders of the defined types were placed in this encounter.

## 2021-09-07 ENCOUNTER — HOSPITAL ENCOUNTER (OUTPATIENT)
Dept: INFUSION THERAPY | Age: 76
Discharge: HOME | End: 2021-09-07
Payer: MEDICARE

## 2021-09-07 ENCOUNTER — OFFICE VISIT (OUTPATIENT)
Dept: ONCOLOGY | Age: 76
End: 2021-09-07
Payer: MEDICARE

## 2021-09-07 VITALS
HEIGHT: 65 IN | HEART RATE: 61 BPM | OXYGEN SATURATION: 98 % | TEMPERATURE: 97.9 F | SYSTOLIC BLOOD PRESSURE: 159 MMHG | DIASTOLIC BLOOD PRESSURE: 94 MMHG | WEIGHT: 147 LBS | RESPIRATION RATE: 18 BRPM | BODY MASS INDEX: 24.49 KG/M2

## 2021-09-07 VITALS
RESPIRATION RATE: 18 BRPM | BODY MASS INDEX: 24.54 KG/M2 | DIASTOLIC BLOOD PRESSURE: 67 MMHG | SYSTOLIC BLOOD PRESSURE: 140 MMHG | HEART RATE: 61 BPM | WEIGHT: 147.3 LBS | HEIGHT: 65 IN | TEMPERATURE: 97.9 F | OXYGEN SATURATION: 98 %

## 2021-09-07 DIAGNOSIS — Z17.1 MALIGNANT NEOPLASM OF UPPER-OUTER QUADRANT OF RIGHT BREAST IN FEMALE, ESTROGEN RECEPTOR NEGATIVE (HCC): Primary | ICD-10-CM

## 2021-09-07 DIAGNOSIS — C50.411 MALIGNANT NEOPLASM OF UPPER-OUTER QUADRANT OF RIGHT BREAST IN FEMALE, ESTROGEN RECEPTOR NEGATIVE (HCC): Primary | ICD-10-CM

## 2021-09-07 LAB
ALBUMIN SERPL-MCNC: 3.5 G/DL (ref 3.5–5)
ALBUMIN/GLOB SERPL: 1.3 {RATIO} (ref 1.1–2.2)
ALP SERPL-CCNC: 111 U/L (ref 45–117)
ALT SERPL-CCNC: 19 U/L (ref 12–78)
ANION GAP SERPL CALC-SCNC: 4 MMOL/L (ref 5–15)
AST SERPL-CCNC: 20 U/L (ref 15–37)
BASO+EOS+MONOS # BLD AUTO: 1 K/UL (ref 0.2–1.2)
BASO+EOS+MONOS NFR BLD AUTO: 30 % (ref 3.2–16.9)
BILIRUB SERPL-MCNC: 0.4 MG/DL (ref 0.2–1)
BUN SERPL-MCNC: 17 MG/DL (ref 6–20)
BUN/CREAT SERPL: 35 (ref 12–20)
CALCIUM SERPL-MCNC: 8.8 MG/DL (ref 8.5–10.1)
CHLORIDE SERPL-SCNC: 107 MMOL/L (ref 97–108)
CO2 SERPL-SCNC: 29 MMOL/L (ref 21–32)
CREAT SERPL-MCNC: 0.49 MG/DL (ref 0.55–1.02)
DIFFERENTIAL METHOD BLD: ABNORMAL
ERYTHROCYTE [DISTWIDTH] IN BLOOD BY AUTOMATED COUNT: 16.8 % (ref 11.8–15.8)
GLOBULIN SER CALC-MCNC: 2.6 G/DL (ref 2–4)
GLUCOSE SERPL-MCNC: 66 MG/DL (ref 65–100)
HCT VFR BLD AUTO: 34.2 % (ref 35–47)
HGB BLD-MCNC: 11.3 G/DL (ref 11.5–16)
LYMPHOCYTES # BLD: 0.5 K/UL (ref 0.8–3.5)
LYMPHOCYTES NFR BLD: 15 % (ref 12–49)
MCH RBC QN AUTO: 31.9 PG (ref 26–34)
MCHC RBC AUTO-ENTMCNC: 33 G/DL (ref 30–36.5)
MCV RBC AUTO: 96.6 FL (ref 80–99)
NEUTS SEG # BLD: 1.7 K/UL (ref 1.8–8)
NEUTS SEG NFR BLD: 55 % (ref 32–75)
PLATELET # BLD AUTO: 289 K/UL (ref 150–400)
POTASSIUM SERPL-SCNC: 4.2 MMOL/L (ref 3.5–5.1)
PROT SERPL-MCNC: 6.1 G/DL (ref 6.4–8.2)
RBC # BLD AUTO: 3.54 M/UL (ref 3.8–5.2)
SODIUM SERPL-SCNC: 140 MMOL/L (ref 136–145)
WBC # BLD AUTO: 3.2 K/UL (ref 3.6–11)

## 2021-09-07 PROCEDURE — 85025 COMPLETE CBC W/AUTO DIFF WBC: CPT

## 2021-09-07 PROCEDURE — 74011000258 HC RX REV CODE- 258: Performed by: INTERNAL MEDICINE

## 2021-09-07 PROCEDURE — 96411 CHEMO IV PUSH ADDL DRUG: CPT

## 2021-09-07 PROCEDURE — 77030012965 HC NDL HUBR BBMI -A

## 2021-09-07 PROCEDURE — 74011250636 HC RX REV CODE- 250/636: Performed by: INTERNAL MEDICINE

## 2021-09-07 PROCEDURE — 80053 COMPREHEN METABOLIC PANEL: CPT

## 2021-09-07 PROCEDURE — G0463 HOSPITAL OUTPT CLINIC VISIT: HCPCS | Performed by: INTERNAL MEDICINE

## 2021-09-07 PROCEDURE — G8536 NO DOC ELDER MAL SCRN: HCPCS | Performed by: INTERNAL MEDICINE

## 2021-09-07 PROCEDURE — 74011250636 HC RX REV CODE- 250/636: Performed by: NURSE PRACTITIONER

## 2021-09-07 PROCEDURE — G8432 DEP SCR NOT DOC, RNG: HCPCS | Performed by: INTERNAL MEDICINE

## 2021-09-07 PROCEDURE — 96367 TX/PROPH/DG ADDL SEQ IV INF: CPT

## 2021-09-07 PROCEDURE — G8399 PT W/DXA RESULTS DOCUMENT: HCPCS | Performed by: INTERNAL MEDICINE

## 2021-09-07 PROCEDURE — 99215 OFFICE O/P EST HI 40 MIN: CPT | Performed by: INTERNAL MEDICINE

## 2021-09-07 PROCEDURE — 1090F PRES/ABSN URINE INCON ASSESS: CPT | Performed by: INTERNAL MEDICINE

## 2021-09-07 PROCEDURE — G8420 CALC BMI NORM PARAMETERS: HCPCS | Performed by: INTERNAL MEDICINE

## 2021-09-07 PROCEDURE — 96375 TX/PRO/DX INJ NEW DRUG ADDON: CPT

## 2021-09-07 PROCEDURE — 96413 CHEMO IV INFUSION 1 HR: CPT

## 2021-09-07 PROCEDURE — 1101F PT FALLS ASSESS-DOCD LE1/YR: CPT | Performed by: INTERNAL MEDICINE

## 2021-09-07 PROCEDURE — G8427 DOCREV CUR MEDS BY ELIG CLIN: HCPCS | Performed by: INTERNAL MEDICINE

## 2021-09-07 PROCEDURE — 36415 COLL VENOUS BLD VENIPUNCTURE: CPT

## 2021-09-07 RX ORDER — PALONOSETRON 0.05 MG/ML
0.25 INJECTION, SOLUTION INTRAVENOUS ONCE
Status: COMPLETED | OUTPATIENT
Start: 2021-09-07 | End: 2021-09-07

## 2021-09-07 RX ORDER — SODIUM CHLORIDE 9 MG/ML
25 INJECTION, SOLUTION INTRAVENOUS CONTINUOUS
Status: DISPENSED | OUTPATIENT
Start: 2021-09-07 | End: 2021-09-07

## 2021-09-07 RX ORDER — DEXAMETHASONE SODIUM PHOSPHATE 4 MG/ML
6 INJECTION, SOLUTION INTRA-ARTICULAR; INTRALESIONAL; INTRAMUSCULAR; INTRAVENOUS; SOFT TISSUE ONCE
Status: COMPLETED | OUTPATIENT
Start: 2021-09-07 | End: 2021-09-07

## 2021-09-07 RX ORDER — FLUOROURACIL 50 MG/ML
1000 INJECTION, SOLUTION INTRAVENOUS ONCE
Status: COMPLETED | OUTPATIENT
Start: 2021-09-07 | End: 2021-09-07

## 2021-09-07 RX ORDER — METHOTREXATE 25 MG/ML
40 INJECTION, SOLUTION INTRA-ARTERIAL; INTRAMUSCULAR; INTRAVENOUS ONCE
Status: COMPLETED | OUTPATIENT
Start: 2021-09-07 | End: 2021-09-07

## 2021-09-07 RX ADMIN — FLUOROURACIL 1000 MG: 50 INJECTION, SOLUTION INTRAVENOUS at 12:25

## 2021-09-07 RX ADMIN — FOSAPREPITANT 150 MG: 150 INJECTION, POWDER, LYOPHILIZED, FOR SOLUTION INTRAVENOUS at 09:51

## 2021-09-07 RX ADMIN — PALONOSETRON 0.25 MG: 0.05 INJECTION, SOLUTION INTRAVENOUS at 09:46

## 2021-09-07 RX ADMIN — DEXAMETHASONE SODIUM PHOSPHATE 6 MG: 4 INJECTION INTRA-ARTICULAR; INTRALESIONAL; INTRAMUSCULAR; INTRAVENOUS; SOFT TISSUE at 09:40

## 2021-09-07 RX ADMIN — CYCLOPHOSPHAMIDE 1000 MG: 500 INJECTION, POWDER, FOR SOLUTION INTRAVENOUS; ORAL at 11:20

## 2021-09-07 RX ADMIN — SODIUM CHLORIDE 25 ML/HR: 900 INJECTION, SOLUTION INTRAVENOUS at 09:41

## 2021-09-07 RX ADMIN — METHOTREXATE 70 MG: 25 INJECTION, SOLUTION INTRA-ARTERIAL; INTRAMUSCULAR; INTRAVENOUS at 11:02

## 2021-09-07 NOTE — PROGRESS NOTES
Cancer [place] at [address], [address] [facility]  [place]: [phone]  F: [phone]      Reason for Visit:   Kimberlee Smoker is a 64 y.o. female who is seen in follow up for evaluation of therapy for breast cancer. Breast surgery: Dr. Jean Macias    Treatment History:   · 3/12/21 GEJ bx:  Squamocolumnar mucosa with reactive changes  · 4/16/21 right breast 1:00 core bx:  IDC, gr 3, 1.3 cm, gr 3, ER negative, [place] negative, HER 2 negative at [facility] 1+  · 5/12/21 right breast lumpectomy: IDC with medullary features, 1.9 cm, gr 3, 0/1 LN, ER negative, CA negative, HER 2 negative at IHC 0, pT1c pN0 cM0  · IV CMF 6/15/21-    History of Present Illness: An abnormal mammogram led to the pathology above    Interval history:  Presents today for follow up and treatment.  She reports gr 1 fatigue, gr 1 sob, gr 1 neuropathy, gr 1 headache    FH:  Maternal aunt with possible breast cancer at an unknown age; sister with ovarian cancer in her 62s; no prostate or pancreas cancer    Past Medical History:   Diagnosis Date    Allergic rhinitis due to other allergen     Arthritis     Cancer ([address]     right breast    Hypercholesterolemia     Psychiatric disorder     anxiety    [address] [address]     \"with stress\" last was 2017      Past Surgical History:   Procedure Laterality Date    HX BREAST LUMPECTOMY Right 5/12/2021    RIGHT BREAST LUMPECTOMY, ABNER CATH INSERTION, RIGHT BREAST SENTINEL NODE BIOPSY performed by Caridad Lewis MD at [place] [address] HX COLONOSCOPY  2021    HX SALPINGO-OOPHORECTOMY Left     at about age 26      Social History     Tobacco Use    Smoking status: Never Smoker    Smokeless tobacco: Never Used   Substance Use Topics    Alcohol use: No      Family History   Problem Relation Age of Onset    Diabetes Mother     Cancer Sister     Cancer Brother     Breast Cancer Maternal Aunt      Current Outpatient Medications   Medication Sig    escitalopram oxalate (LEXAPRO) 20 mg tablet Take 1 Tablet by mouth daily. Indications: anxiousness associated with depression    lidocaine-prilocaine (EMLA) topical cream Apply  to affected area as needed for Pain.  prochlorperazine (COMPAZINE) 10 mg tablet Take 1 Tablet by mouth every six (6) hours as needed for Nausea.  ondansetron hcl (ZOFRAN) 8 mg tablet Take 1 Tablet by mouth every eight (8) hours as needed for Nausea.  alendronate (FOSAMAX) 35 mg tablet Take 70 mg by mouth every seven (7) days. mondays    timoloL (BETIMOL) 0.25 % ophthalmic solution Administer 1-2 Drops to both eyes two (2) times a day. use in affected eye(s)    latanoprost (XALATAN) 0.005 % ophthalmic solution Administer 1 Drop to both eyes daily. No current facility-administered medications for this visit. Allergies   Allergen Reactions    Codeine Other (comments)     Other reaction(s): Other (see comments)  Hallucinations        Review of Systems: A complete review of systems was obtained, negative except as described above. Physical Exam:     Visit Vitals  BP (!) 159/94 Comment: On recheck BP was[de-identified] 144/76 and Pulse--58. Pulse 61 Comment: Pulse on recheck was[de-identified] 58.    Temp 97.9 °F (36.6 °C) (Temporal)   Resp 18   Ht 5' 5\" (1.651 m)   Wt 147 lb (66.7 kg)   LMP 11/15/1990 (Exact Date)   SpO2 98%   BMI 24.46 kg/m²     ECOG PS: 0    Constitutional: Appears well-developed and well-nourished in no apparent distress      Mental status: Alert and awake, Oriented to person/place/time, Able to follow commands    Eyes: EOM normal, Sclera normal, No visible ocular discharge    HENT: Normocephalic, atraumatic    Mouth/Throat: Moist mucous membranes    External Ears normal    Neck: No visualized mass    Pulmonary/Chest: Respiratory effort normal, No visualized signs of difficulty breathing or respiratory distress    Musculoskeletal: Normal gait with no signs of ataxia, Normal range of motion of neck    Neurological: No facial asymmetry (Cranial nerve 7 motor function), No gaze palsy    Skin: No significant exanthematous lesions or discoloration noted on facial skin    Psychiatric: Normal affect, No hallucinations           Results:     Lab Results   Component Value Date/Time    WBC 3.2 (L) 09/07/2021 07:56 AM    HGB 11.3 (L) 09/07/2021 07:56 AM    HCT 34.2 (L) 09/07/2021 07:56 AM    PLATELET 304 77/62/2250 07:56 AM    MCV 96.6 09/07/2021 07:56 AM    ABS. NEUTROPHILS 1.7 (L) 09/07/2021 07:56 AM     Lab Results   Component Value Date/Time    Sodium 140 08/17/2021 08:02 AM    Potassium 3.8 08/17/2021 08:02 AM    Chloride 106 08/17/2021 08:02 AM    CO2 30 08/17/2021 08:02 AM    Glucose 86 08/17/2021 08:02 AM    BUN 13 08/17/2021 08:02 AM    Creatinine 0.61 08/17/2021 08:02 AM    GFR est AA >60 08/17/2021 08:02 AM    GFR est non-AA >60 08/17/2021 08:02 AM    Calcium 8.6 08/17/2021 08:02 AM     Lab Results   Component Value Date/Time    Bilirubin, total 0.4 08/17/2021 08:02 AM    ALT (SGPT) 17 08/17/2021 08:02 AM    Alk. phosphatase 100 08/17/2021 08:02 AM    Protein, total 6.4 08/17/2021 08:02 AM    Albumin 3.5 08/17/2021 08:02 AM    Globulin 2.9 08/17/2021 08:02 AM     3/30/21 dexa     This patient is osteoporotic using the World Health Organization criteria  As compared to the prior study, there has been a significant 5.6% increase in  the left total hip and a significant 9% increase in the right total hip    4/12/21 right breast US  IMPRESSION should read \"BI-RADS 4. Suspicious abnormality. Recommend histologic  correlation. 1.8 cm x 1.6 cm x 1.2 cm 10:00 right breast lesion. Records reviewed and summarized above. Pathology report(s) reviewed above. Radiology report(s) reviewed above. Assessment/plan:   1.  Right breast IDC, gr 3, triple negative, cT1c cN0 cM0, stage IA, prognostic stage IB    · 5/12/21 right breast lumpectomy: IDC with medullary features, 1.9 cm, gr 3, 0/1 LN, ER negative, [place] negative, HER 2 negative at IHC 0, pT1c pN0 cM0    We explained to the patient that the goal of systemic adjuvant therapy is to improve the chances for cure and decrease the risk of relapse. We explained why a patient can have microscopic cancer spread now even though physical examination, laboratory studies and imaging studies are negative for cancer. We explained that the same treatments used now as adjuvant or preventive treatments rarely if ever are curative in women who develop metastases. Using eprognosis. org, her 5 year all cause mortality risk is 9-15%; her 10 year all cause mortality risk is 34-43%; her median OS is 12-14 years. An adjuvant discussion is warranted. No falls in past 6 months    Discussed IV CMF q 3 weeks x 6.  CMF (methotrexate 40 mg/m2, cyclophosphamide 600 mg/m2, fluorouracil 600 mg/m2).  Side effects discussed including, but not limited to, fatigue, myelosuppression, diarrhea, nausea, vomiting, mouth sores, and possible alopecia, and a possible, but very rare, severe allergic reaction to 5-FU.     Discussed increased risk of death with COVID19 and precautions to take with chemotherapy. Received COVID vaccination on 5/19/21     Discussed cold caps, she declines    After this discussion, she is agreeable to CMF, and signed informed consent. Port was placed with Dr. Solange Burgos. CMF #5 today. Tolerating well. Labs reviewed. We will plan to see the patient in follow up at least once per cycle, or sooner if symptoms warrant. Labs with each cycle to monitor for toxicity    Rx:  Zofran, compazine, emla cream    2. Emotional well being:  She has excellent support and is coping well with her disease    3. Osteoporosis:  On fosamax and vit D 50,000 units weekly    4. Osteoarthritis: She has been off Diclofenac due to chemotherapy. Previously taking twice daily. Tried Aleve PRN, but doesn't feel this is helping. Will need to stay off of the diclofenac while on CMF. 5. Anemia:  Mild, due to chemo.     6. Mood Swings: she is on lexapro 20 mg daily; discussed support groups and counseling. Have decreased dex to 6 mg. Signed By: Dank Simms MD      No orders of the defined types were placed in this encounter.

## 2021-09-07 NOTE — PROGRESS NOTES
[facility] Progress Note    Date: 2021    Name: Anahy Guan    MRN: [id number]         : 1945    Ms. Dain Lennon Arrived ambulatory and in no distress for C5D1 of CMF Regimen. Assessment was completed, no acute issues at this time, no new complaints voiced. Right chest wall port accessed without difficulty, labs drawn & sent for processing. Chemotherapy Flowsheet 2021   Cycle C5D1   Date 2021   Drug / Regimen -   Pre Meds given   Notes given        Patient proceed to appointment with Dr. Carmen Hines. Ms. Bhagat's vitals were reviewed. Visit Vitals  BP (!) 159/94 (BP 1 Location: Right arm, BP Patient Position: Sitting)   Pulse 61   Temp 97.9 °F (36.6 °C)   Resp 18   Ht 5' 5\" (1.651 m)   Wt 66.8 kg (147 lb 4.8 oz)   SpO2 98%   Breastfeeding No   BMI 24.51 kg/m²       Lab results were obtained and reviewed. Recent Results (from the past 12 hour(s))   CBC WITH 3 PART DIFF    Collection Time: 21  7:56 AM   Result Value Ref Range    WBC 3.2 (L) 3.6 - 11.0 K/uL    RBC 3.54 (L) 3.80 - 5.20 M/uL    HGB 11.3 (L) 11.5 - 16.0 g/dL    HCT 34.2 (L) 35.0 - 47.0 %    MCV 96.6 80.0 - 99.0 FL    MCH 31.9 26.0 - 34.0 PG    MCHC 33.0 30.0 - 36.5 g/dL    RDW 16.8 (H) 11.8 - 15.8 %    PLATELET 226 397 - 238 K/uL    NEUTROPHILS 55 32 - 75 %    MIXED CELLS 30 (H) 3.2 - 16.9 %    LYMPHOCYTES 15 12 - 49 %    ABS. NEUTROPHILS 1.7 (L) 1.8 - 8.0 K/UL    ABS. MIXED CELLS 1.0 0.2 - 1.2 K/uL    ABS. LYMPHOCYTES 0.5 (L) 0.8 - 3.5 K/UL    DF AUTOMATED         Medications:      Ms. Dain Lennon tolerated treatment well and was discharged from [place] [address] in stable condition at ***. Port de-accessed, flushed & heparinized per protocol. She is to return on {MONTHS:79459} {Numbers; [id number]} at *** for her next appointment.     Jae Hagan RN  2021

## 2021-09-08 ENCOUNTER — TELEPHONE (OUTPATIENT)
Dept: ONCOLOGY | Age: 76
End: 2021-09-08

## 2021-09-08 NOTE — TELEPHONE ENCOUNTER
9/8/21 4:48 PM: Called patient and spoke to her spouse, who is listed on her PHI form. Advised patient's spouse that per Dr. Renata Purcell, she only has one cycle left of CMF. Also advised that the extra infusion center appointments will be canceled. Patient's spouse verbalized understanding and did not have any other questions at this time.

## 2021-09-08 NOTE — TELEPHONE ENCOUNTER
Patient had a visit with Dr. Joe Fernandez 9/7/21 and understood  To say that last tx would be at the end of Sept.  Patient's  called today concerned that there are additional  tx appointments scheduled past the end of Sept.  Please call to advise.   Call back [phone]

## 2021-09-13 ENCOUNTER — OFFICE VISIT (OUTPATIENT)
Dept: CARDIOLOGY CLINIC | Age: 76
End: 2021-09-13
Payer: MEDICARE

## 2021-09-13 VITALS
OXYGEN SATURATION: 96 % | DIASTOLIC BLOOD PRESSURE: 66 MMHG | HEART RATE: 80 BPM | HEIGHT: 65 IN | WEIGHT: 147 LBS | SYSTOLIC BLOOD PRESSURE: 122 MMHG | BODY MASS INDEX: 24.49 KG/M2

## 2021-09-13 DIAGNOSIS — I45.5 SINUS PAUSE: ICD-10-CM

## 2021-09-13 DIAGNOSIS — Z86.79 HISTORY OF CARDIAC ARRHYTHMIA: Primary | ICD-10-CM

## 2021-09-13 PROCEDURE — G8427 DOCREV CUR MEDS BY ELIG CLIN: HCPCS | Performed by: INTERNAL MEDICINE

## 2021-09-13 PROCEDURE — 1101F PT FALLS ASSESS-DOCD LE1/YR: CPT | Performed by: INTERNAL MEDICINE

## 2021-09-13 PROCEDURE — G0463 HOSPITAL OUTPT CLINIC VISIT: HCPCS | Performed by: INTERNAL MEDICINE

## 2021-09-13 PROCEDURE — G8536 NO DOC ELDER MAL SCRN: HCPCS | Performed by: INTERNAL MEDICINE

## 2021-09-13 PROCEDURE — G8399 PT W/DXA RESULTS DOCUMENT: HCPCS | Performed by: INTERNAL MEDICINE

## 2021-09-13 PROCEDURE — G8420 CALC BMI NORM PARAMETERS: HCPCS | Performed by: INTERNAL MEDICINE

## 2021-09-13 PROCEDURE — 1090F PRES/ABSN URINE INCON ASSESS: CPT | Performed by: INTERNAL MEDICINE

## 2021-09-13 PROCEDURE — G8432 DEP SCR NOT DOC, RNG: HCPCS | Performed by: INTERNAL MEDICINE

## 2021-09-13 PROCEDURE — 99213 OFFICE O/P EST LOW 20 MIN: CPT | Performed by: INTERNAL MEDICINE

## 2021-09-13 NOTE — PROGRESS NOTES
Susan Minaya MD., [facility]    [address] [place], [address] [facility]    Office [phone],[facility] [phone]           Brook Wiseman is here for a f/u office visit. Primary care physician:  Jaz Schulz MD    CC - as documented in EMR    Dear Dr. Jaz Schulz MD    I had the pleasure of seeing Ms. Brook Wiseman in the office today. Assessment:     History of arrhythmia - 8 second pause during procedure ( anesthesia/L Port placement) 5/12/21  Mobitz 2 type I; 1st deg AV block  Breast CA- S/p breast biopsy and port placement ; S/p Breat lumpectomy; now on chemotherapy (6 cycles of cyclophosphamide, methotrexate, 5-fluorouracil); last treatment 9/28/2021  Event monitor-5/12/2021-6/10/2021-PAC burden 3%, PVC burden 10%. 9 times NSVT ( longest 9 beat run)      Plan:    Limited echo for EF October 2021  April nuclear study-6/2021-normal  Not on AV cande blocking agents. TSH 5.62 ( mildly elevated) 5/12/21 -recheck 6/40/21-within normal limits  Aggressive cardiovascular risk factor modification. Avoid QT prolonging drugs. Follow-up in 6 months/earlier as needed      Patient understands the plan. All questions were answered to the patient's satisfaction. I appreciate the opportunity to be involved in Ms. Junior. See note below for details. Please do not hesitate to contact us with questions or concerns. Susan Minaya MD      Cardiac Testing/ Procedures: A. Cardiac Cath/PCI:    B.ECHO/JOSELINE: 5/12/21 - Nml EF/Mild MR    C. StressNuclear/Stress ECHO/Stress test: 6/28/2021-normal April nuclear study. EF 65%    D. Vascular:    E. EP: Event monitor 5/12/2021-6/10/2021-patient monitored for 28 days 10/60 minutes  50 events were transmitted-1 symptomatic, 49 device triggered.   SVT occurred 1 time with fastest of 107 bpm.  NSVT occurred 9 times with fastest run of 187 bpm.  Heart block occurred 2 times-first-degree AV block slowest heartbeat 60 bpm.  PAC burden-3%, PVC burden 10%. No A. fib. No pauses. F. Miscellaneous:    History:     Rosa Nowak is a 64 y.o. female who returns for follow up visit. No CP/dyspnea/swelling LE/palpitaitons        ROS:  (bold if positive, if negative)           Medications:       Current Outpatient Medications   Medication Sig Dispense    escitalopram oxalate (LEXAPRO) 20 mg tablet Take 1 Tablet by mouth daily. Indications: anxiousness associated with depression 30 Tablet    lidocaine-prilocaine (EMLA) topical cream Apply  to affected area as needed for Pain. 30 g    prochlorperazine (COMPAZINE) 10 mg tablet Take 1 Tablet by mouth every six (6) hours as needed for Nausea. 50 Tablet    ondansetron hcl (ZOFRAN) 8 mg tablet Take 1 Tablet by mouth every eight (8) hours as needed for Nausea. 24 Tablet    alendronate (FOSAMAX) 35 mg tablet Take 70 mg by mouth every seven (7) days. mondays     timoloL (BETIMOL) 0.25 % ophthalmic solution Administer 1-2 Drops to both eyes two (2) times a day. use in affected eye(s)     latanoprost (XALATAN) 0.005 % ophthalmic solution Administer 1 Drop to both eyes daily. No current facility-administered medications for this visit. Family History of CAD:    No    Social History:  Current  Smoker  No    Physical Exam:     Visit Vitals  /66 (BP 1 Location: Left upper arm, BP Patient Position: Sitting)   Pulse 80   Ht 5' 5\" (1.651 m)   Wt 147 lb (66.7 kg)   LMP 11/15/1990 (Exact Date)   SpO2 96%   BMI 24.46 kg/m²          Gen: Well-developed, well-nourished, in no acute distress  Neck: Supple,No JVD, No Carotid Bruit,   Resp: No accessory muscle use, Clear breath sounds, No rales or rhonchi  Card: Regular Rate,Rythm,Normal S1, S2, No murmurs, rubs or gallop. No thrills.    Abd:  Soft,BS+,   MSK: No cyanosis  Skin: No rashes    Neuro: moving all four extremities , follows commands appropriately  Psych:  Good insight, oriented to person, place , alert, Nml Affect  LE: No edema    EKG:       Medication Side Effects and Warnings were discussed with patient: yes  Patient Labs were reviewed and/or requested:  yes  Patient Past Records were reviewed and/or requested: yes    Total time :        mins    ATTENTION:   This medical record was transcribed using an electronic medical records/speech recognition system. Although proofread, it may and can contain electronic, spelling and other errors. Corrections may be executed at a later time. Please feel free to contact us for any clarifications as needed.       Ann Cooley MD

## 2021-09-13 NOTE — LETTER
9/13/2021    Patient: Portia Santos   YOB: 1945   Date of Visit: 9/13/2021     Juliette Calderon MD  [address] [facility] [address] [id number]  Via Fax: [phone]    Dear Juliette Calderon MD,      Thank you for referring Ms. Meri Jean to [facility] for evaluation. My notes for this consultation are attached. If you have questions, please do not hesitate to call me. I look forward to following your patient along with you.       Sincerely,    Rozina Hair MD

## 2021-09-13 NOTE — PROGRESS NOTES
Chief Complaint   Patient presents with    Follow-up     NSVT       Visit Vitals  /66 (BP 1 Location: Left upper arm, BP Patient Position: Sitting)   Pulse 80   Ht 5' 5\" (1.651 m)   Wt 147 lb (66.7 kg)   SpO2 96%   BMI 24.46 kg/m²       Chest pain denied  SOB - with activity  Dizziness denied  Swelling/Edema - BL/LE at times  Recent hospital visit denied  Refills denied

## 2021-09-28 ENCOUNTER — HOSPITAL ENCOUNTER (OUTPATIENT)
Dept: INFUSION THERAPY | Age: 76
Discharge: HOME | End: 2021-09-28
Payer: MEDICARE

## 2021-09-28 ENCOUNTER — OFFICE VISIT (OUTPATIENT)
Dept: ONCOLOGY | Age: 76
End: 2021-09-28
Payer: MEDICARE

## 2021-09-28 VITALS
RESPIRATION RATE: 18 BRPM | SYSTOLIC BLOOD PRESSURE: 130 MMHG | BODY MASS INDEX: 24.69 KG/M2 | TEMPERATURE: 96.9 F | WEIGHT: 148.2 LBS | HEIGHT: 65 IN | DIASTOLIC BLOOD PRESSURE: 70 MMHG | OXYGEN SATURATION: 97 % | HEART RATE: 68 BPM

## 2021-09-28 VITALS
HEIGHT: 65 IN | WEIGHT: 148 LBS | HEART RATE: 75 BPM | DIASTOLIC BLOOD PRESSURE: 60 MMHG | TEMPERATURE: 96.9 F | RESPIRATION RATE: 18 BRPM | BODY MASS INDEX: 24.66 KG/M2 | SYSTOLIC BLOOD PRESSURE: 134 MMHG | OXYGEN SATURATION: 98 %

## 2021-09-28 DIAGNOSIS — Z17.1 MALIGNANT NEOPLASM OF UPPER-OUTER QUADRANT OF RIGHT BREAST IN FEMALE, ESTROGEN RECEPTOR NEGATIVE (HCC): Primary | ICD-10-CM

## 2021-09-28 DIAGNOSIS — Z51.11 ENCOUNTER FOR ANTINEOPLASTIC CHEMOTHERAPY: ICD-10-CM

## 2021-09-28 DIAGNOSIS — C50.411 MALIGNANT NEOPLASM OF UPPER-OUTER QUADRANT OF RIGHT BREAST IN FEMALE, ESTROGEN RECEPTOR NEGATIVE (HCC): Primary | ICD-10-CM

## 2021-09-28 LAB
ALBUMIN SERPL-MCNC: 3.3 G/DL (ref 3.5–5)
ALBUMIN/GLOB SERPL: 1.1 {RATIO} (ref 1.1–2.2)
ALP SERPL-CCNC: 78 U/L (ref 45–117)
ALT SERPL-CCNC: 19 U/L (ref 12–78)
ANION GAP SERPL CALC-SCNC: 5 MMOL/L (ref 5–15)
AST SERPL-CCNC: 17 U/L (ref 15–37)
BASOPHILS # BLD: 0 K/UL (ref 0–0.1)
BASOPHILS NFR BLD: 1 % (ref 0–1)
BILIRUB SERPL-MCNC: 0.5 MG/DL (ref 0.2–1)
BUN SERPL-MCNC: 14 MG/DL (ref 6–20)
BUN/CREAT SERPL: 18 (ref 12–20)
CALCIUM SERPL-MCNC: 8.7 MG/DL (ref 8.5–10.1)
CHLORIDE SERPL-SCNC: 108 MMOL/L (ref 97–108)
CO2 SERPL-SCNC: 28 MMOL/L (ref 21–32)
CREAT SERPL-MCNC: 0.78 MG/DL (ref 0.55–1.02)
DIFFERENTIAL METHOD BLD: ABNORMAL
EOSINOPHIL # BLD: 0.2 K/UL (ref 0–0.4)
EOSINOPHIL NFR BLD: 9 % (ref 0–7)
ERYTHROCYTE [DISTWIDTH] IN BLOOD BY AUTOMATED COUNT: 16.9 % (ref 11.5–14.5)
GLOBULIN SER CALC-MCNC: 3 G/DL (ref 2–4)
GLUCOSE SERPL-MCNC: 103 MG/DL (ref 65–100)
HCT VFR BLD AUTO: 32.6 % (ref 35–47)
HGB BLD-MCNC: 10.5 G/DL (ref 11.5–16)
IMM GRANULOCYTES # BLD AUTO: 0.1 K/UL (ref 0–0.04)
IMM GRANULOCYTES NFR BLD AUTO: 4 % (ref 0–0.5)
LYMPHOCYTES # BLD: 0.4 K/UL (ref 0.8–3.5)
LYMPHOCYTES NFR BLD: 17 % (ref 12–49)
MCH RBC QN AUTO: 31.4 PG (ref 26–34)
MCHC RBC AUTO-ENTMCNC: 32.2 G/DL (ref 30–36.5)
MCV RBC AUTO: 97.6 FL (ref 80–99)
MONOCYTES # BLD: 0.6 K/UL (ref 0–1)
MONOCYTES NFR BLD: 27 % (ref 5–13)
NEUTS SEG # BLD: 1.1 K/UL (ref 1.8–8)
NEUTS SEG NFR BLD: 42 % (ref 32–75)
NRBC # BLD: 0.02 K/UL (ref 0–0.01)
NRBC BLD-RTO: 0.8 PER 100 WBC
PLATELET # BLD AUTO: 286 K/UL (ref 150–400)
PMV BLD AUTO: 9.3 FL (ref 8.9–12.9)
POTASSIUM SERPL-SCNC: 3.6 MMOL/L (ref 3.5–5.1)
PROT SERPL-MCNC: 6.3 G/DL (ref 6.4–8.2)
RBC # BLD AUTO: 3.34 M/UL (ref 3.8–5.2)
RBC MORPH BLD: ABNORMAL
SODIUM SERPL-SCNC: 141 MMOL/L (ref 136–145)
WBC # BLD AUTO: 2.4 K/UL (ref 3.6–11)

## 2021-09-28 PROCEDURE — 85025 COMPLETE CBC W/AUTO DIFF WBC: CPT

## 2021-09-28 PROCEDURE — 74011250636 HC RX REV CODE- 250/636: Performed by: INTERNAL MEDICINE

## 2021-09-28 PROCEDURE — 74011250636 HC RX REV CODE- 250/636: Performed by: NURSE PRACTITIONER

## 2021-09-28 PROCEDURE — G8427 DOCREV CUR MEDS BY ELIG CLIN: HCPCS | Performed by: INTERNAL MEDICINE

## 2021-09-28 PROCEDURE — 96413 CHEMO IV INFUSION 1 HR: CPT

## 2021-09-28 PROCEDURE — 77030016057 HC NDL HUBR APOL -B

## 2021-09-28 PROCEDURE — 74011000258 HC RX REV CODE- 258: Performed by: INTERNAL MEDICINE

## 2021-09-28 PROCEDURE — 1101F PT FALLS ASSESS-DOCD LE1/YR: CPT | Performed by: INTERNAL MEDICINE

## 2021-09-28 PROCEDURE — 36415 COLL VENOUS BLD VENIPUNCTURE: CPT

## 2021-09-28 PROCEDURE — G8536 NO DOC ELDER MAL SCRN: HCPCS | Performed by: INTERNAL MEDICINE

## 2021-09-28 PROCEDURE — 96375 TX/PRO/DX INJ NEW DRUG ADDON: CPT

## 2021-09-28 PROCEDURE — 96366 THER/PROPH/DIAG IV INF ADDON: CPT

## 2021-09-28 PROCEDURE — 80053 COMPREHEN METABOLIC PANEL: CPT

## 2021-09-28 PROCEDURE — 96411 CHEMO IV PUSH ADDL DRUG: CPT

## 2021-09-28 PROCEDURE — G0463 HOSPITAL OUTPT CLINIC VISIT: HCPCS | Performed by: INTERNAL MEDICINE

## 2021-09-28 PROCEDURE — 1090F PRES/ABSN URINE INCON ASSESS: CPT | Performed by: INTERNAL MEDICINE

## 2021-09-28 PROCEDURE — G8399 PT W/DXA RESULTS DOCUMENT: HCPCS | Performed by: INTERNAL MEDICINE

## 2021-09-28 PROCEDURE — G8420 CALC BMI NORM PARAMETERS: HCPCS | Performed by: INTERNAL MEDICINE

## 2021-09-28 PROCEDURE — 99215 OFFICE O/P EST HI 40 MIN: CPT | Performed by: INTERNAL MEDICINE

## 2021-09-28 PROCEDURE — G8432 DEP SCR NOT DOC, RNG: HCPCS | Performed by: INTERNAL MEDICINE

## 2021-09-28 RX ORDER — DEXAMETHASONE SODIUM PHOSPHATE 4 MG/ML
6 INJECTION, SOLUTION INTRA-ARTICULAR; INTRALESIONAL; INTRAMUSCULAR; INTRAVENOUS; SOFT TISSUE ONCE
Status: COMPLETED | OUTPATIENT
Start: 2021-09-28 | End: 2021-09-28

## 2021-09-28 RX ORDER — FLUOROURACIL 50 MG/ML
1000 INJECTION, SOLUTION INTRAVENOUS ONCE
Status: COMPLETED | OUTPATIENT
Start: 2021-09-28 | End: 2021-09-28

## 2021-09-28 RX ORDER — SODIUM CHLORIDE 0.9 % (FLUSH) 0.9 %
10 SYRINGE (ML) INJECTION AS NEEDED
Status: DISPENSED | OUTPATIENT
Start: 2021-09-28 | End: 2021-09-28

## 2021-09-28 RX ORDER — SODIUM CHLORIDE 9 MG/ML
10 INJECTION INTRAMUSCULAR; INTRAVENOUS; SUBCUTANEOUS AS NEEDED
Status: ACTIVE | OUTPATIENT
Start: 2021-09-28 | End: 2021-09-28

## 2021-09-28 RX ORDER — SODIUM CHLORIDE 9 MG/ML
25 INJECTION, SOLUTION INTRAVENOUS CONTINUOUS
Status: DISPENSED | OUTPATIENT
Start: 2021-09-28 | End: 2021-09-28

## 2021-09-28 RX ORDER — PALONOSETRON 0.05 MG/ML
0.25 INJECTION, SOLUTION INTRAVENOUS ONCE
Status: COMPLETED | OUTPATIENT
Start: 2021-09-28 | End: 2021-09-28

## 2021-09-28 RX ORDER — METHOTREXATE 25 MG/ML
40 INJECTION, SOLUTION INTRA-ARTERIAL; INTRAMUSCULAR; INTRAVENOUS ONCE
Status: COMPLETED | OUTPATIENT
Start: 2021-09-28 | End: 2021-09-28

## 2021-09-28 RX ORDER — HEPARIN 100 UNIT/ML
300-500 SYRINGE INTRAVENOUS AS NEEDED
Status: DISPENSED | OUTPATIENT
Start: 2021-09-28 | End: 2021-09-28

## 2021-09-28 RX ADMIN — DEXAMETHASONE SODIUM PHOSPHATE 6 MG: 4 INJECTION INTRA-ARTICULAR; INTRALESIONAL; INTRAMUSCULAR; INTRAVENOUS; SOFT TISSUE at 09:29

## 2021-09-28 RX ADMIN — SODIUM CHLORIDE 25 ML/HR: 900 INJECTION, SOLUTION INTRAVENOUS at 09:24

## 2021-09-28 RX ADMIN — METHOTREXATE 70 MG: 25 INJECTION, SOLUTION INTRA-ARTERIAL; INTRAMUSCULAR; INTRAVENOUS at 10:31

## 2021-09-28 RX ADMIN — Medication 10 ML: at 07:30

## 2021-09-28 RX ADMIN — FOSAPREPITANT 150 MG: 150 INJECTION, POWDER, LYOPHILIZED, FOR SOLUTION INTRAVENOUS at 09:35

## 2021-09-28 RX ADMIN — PALONOSETRON 0.25 MG: 0.25 INJECTION, SOLUTION INTRAVENOUS at 09:24

## 2021-09-28 RX ADMIN — HEPARIN 500 UNITS: 100 SYRINGE at 12:20

## 2021-09-28 RX ADMIN — CYCLOPHOSPHAMIDE 1000 MG: 500 INJECTION, POWDER, FOR SOLUTION INTRAVENOUS; ORAL at 10:38

## 2021-09-28 RX ADMIN — SODIUM CHLORIDE 10 ML: 9 INJECTION INTRAMUSCULAR; INTRAVENOUS; SUBCUTANEOUS at 07:30

## 2021-09-28 RX ADMIN — FLUOROURACIL 1000 MG: 50 INJECTION, SOLUTION INTRAVENOUS at 12:14

## 2021-09-28 RX ADMIN — Medication 10 ML: at 12:20

## 2021-09-28 NOTE — PROGRESS NOTES
[facility] [place] [facility] [place] [address] [facility]  Juan Daniel Ken: [phone]  F: [phone]      Reason for Visit:   Aristeo Guido is a 64 y.o. female who is seen in follow up for evaluation of therapy for breast cancer. Breast surgery: Dr. Lili Sim    Treatment History:   · 3/12/21 GEJ bx:  Squamocolumnar mucosa with reactive changes  · 4/16/21 right breast 1:00 core bx:  IDC, gr 3, 1.3 cm, gr 3, ER negative, MI negative, HER 2 negative at [facility] 1+  · 5/12/21 right breast lumpectomy: IDC with medullary features, 1.9 cm, gr 3, 0/1 LN, ER negative, MI negative, HER 2 negative at [facility] 0, pT1c pN0 cM0  · CMF 6/15/21-9/28/21    History of Present Illness: An abnormal mammogram led to the pathology above    Interval history:  Presents today for follow up and treatment. She reports gr 1 constipation, gr 1 fatigue, gr 1 agitation, gr 1 pain to upper stomach but none reported today, gr 1 dyspnea, gr 1 itching, gr 1 edema to right arm improving, gr 1 neuropathy to right arm improving, gr 1 headache.      FH:  Maternal aunt with possible breast cancer at an unknown age; sister with ovarian cancer in her 62s; no prostate or pancreas cancer    Past Medical History:   Diagnosis Date    Allergic rhinitis due to other allergen     Arthritis     Cancer ([address]     right breast    Hypercholesterolemia     Psychiatric disorder     anxiety    Seizures ([address]     \"with stress\" last was 2017      Past Surgical History:   Procedure Laterality Date    HX BREAST LUMPECTOMY Right 5/12/2021    RIGHT BREAST LUMPECTOMY, ABNER CATH INSERTION, RIGHT BREAST SENTINEL NODE BIOPSY performed by Carter Ramirez MD at [facility] 57 HX COLONOSCOPY  2021    HX SALPINGO-OOPHORECTOMY Left     at about age 26      Social History     Tobacco Use    Smoking status: Never Smoker    Smokeless tobacco: Never Used   Substance Use Topics    Alcohol use: No      Family History   Problem Relation Age of Onset    Diabetes Mother     Cancer Sister     Cancer Brother     Breast Cancer Maternal Aunt      Current Outpatient Medications   Medication Sig    escitalopram oxalate (LEXAPRO) 20 mg tablet Take 1 Tablet by mouth daily. Indications: anxiousness associated with depression    lidocaine-prilocaine (EMLA) topical cream Apply  to affected area as needed for Pain.  prochlorperazine (COMPAZINE) 10 mg tablet Take 1 Tablet by mouth every six (6) hours as needed for Nausea.  ondansetron hcl (ZOFRAN) 8 mg tablet Take 1 Tablet by mouth every eight (8) hours as needed for Nausea.  alendronate (FOSAMAX) 35 mg tablet Take 70 mg by mouth every seven (7) days. mondays    timoloL (BETIMOL) 0.25 % ophthalmic solution Administer 1-2 Drops to both eyes two (2) times a day. use in affected eye(s)    latanoprost (XALATAN) 0.005 % ophthalmic solution Administer 1 Drop to both eyes daily. No current facility-administered medications for this visit. Facility-Administered Medications Ordered in Other Visits   Medication Dose Route Frequency    0.9% sodium chloride infusion  25 mL/hr IntraVENous CONTINUOUS    cyclophosphamide (CYTOXAN) 1,000 mg in 0.9% sodium chloride 250 mL, overfill volume 25 mL chemo infusion  1,000 mg IntraVENous ONCE    fluorouraciL (ADRUCIL) chemo syringe 1,000 mg  1,000 mg IntraVENous ONCE    sodium chloride (NS) flush 10 mL  10 mL IntraVENous PRN    0.9% sodium chloride injection 10 mL  10 mL IntraVENous PRN    heparin (porcine) pf 300-500 Units  300-500 Units InterCATHeter PRN      Allergies   Allergen Reactions    Codeine Other (comments)     Other reaction(s): Other (see comments)  Hallucinations        Review of Systems: A complete review of systems was obtained, negative except as described above.     Physical Exam:     Visit Vitals  /60   Pulse 75   Temp 96.9 °F (36.1 °C) (Temporal)   Resp 18   Ht 5' 5\" (1.651 m)   Wt 148 lb (67.1 kg)   LMP 11/15/1990 (Exact Date) SpO2 98%   BMI 24.63 kg/m²     ECOG PS: 0    Constitutional: Appears well-developed and well-nourished in no apparent distress      Mental status: Alert and awake, Oriented to person/place/time, Able to follow commands    Eyes: EOM normal, Sclera normal, No visible ocular discharge    HENT: Normocephalic, atraumatic    Mouth/Throat: Moist mucous membranes    External Ears normal    Neck: No visualized mass    Pulmonary/Chest: Respiratory effort normal, No visualized signs of difficulty breathing or respiratory distress    Musculoskeletal: Normal gait with no signs of ataxia, Normal range of motion of neck    Neurological: No facial asymmetry (Cranial nerve 7 motor function), No gaze palsy    Skin: No significant exanthematous lesions or discoloration noted on facial skin    Psychiatric: Normal affect, No hallucinations           Results:     Lab Results   Component Value Date/Time    WBC 2.4 (L) 09/28/2021 07:47 AM    HGB 10.5 (L) 09/28/2021 07:47 AM    HCT 32.6 (L) 09/28/2021 07:47 AM    PLATELET 506 07/23/9517 07:47 AM    MCV 97.6 09/28/2021 07:47 AM    ABS. NEUTROPHILS 1.1 (L) 09/28/2021 07:47 AM     Lab Results   Component Value Date/Time    Sodium 141 09/28/2021 07:47 AM    Potassium 3.6 09/28/2021 07:47 AM    Chloride 108 09/28/2021 07:47 AM    CO2 28 09/28/2021 07:47 AM    Glucose 103 (H) 09/28/2021 07:47 AM    BUN 14 09/28/2021 07:47 AM    Creatinine 0.78 09/28/2021 07:47 AM    GFR est AA >60 09/28/2021 07:47 AM    GFR est non-AA >60 09/28/2021 07:47 AM    Calcium 8.7 09/28/2021 07:47 AM     Lab Results   Component Value Date/Time    Bilirubin, total 0.5 09/28/2021 07:47 AM    ALT (SGPT) 19 09/28/2021 07:47 AM    Alk.  phosphatase 78 09/28/2021 07:47 AM    Protein, total 6.3 (L) 09/28/2021 07:47 AM    Albumin 3.3 (L) 09/28/2021 07:47 AM    Globulin 3.0 09/28/2021 07:47 AM     3/30/21 dexa     This patient is osteoporotic using the World Health Organization criteria  As compared to the prior study, there has been a significant 5.6% increase in  the left total hip and a significant 9% increase in the right total hip    4/12/21 right breast US  IMPRESSION should read \"BI-RADS 4. Suspicious abnormality. Recommend histologic  correlation. 1.8 cm x 1.6 cm x 1.2 cm 10:00 right breast lesion. Records reviewed and summarized above. Pathology report(s) reviewed above. Radiology report(s) reviewed above. Assessment/plan:   1. 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 We explained why a patient can have microscopic cancer spread now even though physical examination, laboratory studies and imaging studies are negative for cancer. We explained that the same treatments used now as adjuvant or preventive treatments rarely if ever are curative in women who develop metastases. Using eprognosis. org, her 5 year all cause mortality risk is 9-15%; her 10 year all cause mortality risk is 34-43%; her median OS is 12-14 years. An adjuvant discussion is warranted. No falls in past 6 months    Discussed IV CMF q 3 weeks x 6.  CMF (methotrexate 40 mg/m2, cyclophosphamide 600 mg/m2, fluorouracil 600 mg/m2).  Side effects discussed including, but not limited to, fatigue, myelosuppression, diarrhea, nausea, vomiting, mouth sores, and possible alopecia, and a possible, but very rare, severe allergic reaction to 5-FU.     Discussed increased risk of death with COVID19 and precautions to take with chemotherapy. Received COVID vaccination on 5/19/21.     Discussed cold caps, she declines    After this discussion, she is agreeable to CMF, and signed informed consent.   Port was placed with  [facility] [place]. CMF #6 today. Tolerating well. Labs reviewed. We will plan to see the patient in follow up at least once per cycle, or sooner if symptoms warrant. Labs with each cycle to monitor for toxicity    Rx:  Zofran, compazine, emla cream    2. Emotional well being:  She has excellent support and is coping well with her disease    3. Osteoporosis:  On fosamax and vit D 50,000 units weekly    4. Osteoarthritis: She has been off Diclofenac due to chemotherapy. Previously taking twice daily. Tried Aleve PRN, but doesn't feel this is helping. Will need to stay off of the diclofenac while on CMF. 5. Anemia:  Mild, due to chemo. 6. Mood Swings: she is on lexapro 20 mg daily; discussed support groups and counseling. Have decreased dex to 6 mg. Stable. RTC in 9 weeks or sooner if needed. I have personally seen and evaluated the patient in conjunction with Catherine Hill NP. I find the patient's history and physical exam are consistent with the NP's documentation. I agree with the above assessment and plan, which I have modified as needed. She is tolerating systemic therapy with manageable toxicity, so we will proceed with her final cycle of chemotherapy today. Signed By: Eliecer Rodrigues MD      No orders of the defined types were placed in this encounter.

## 2021-09-28 NOTE — PROGRESS NOTES
[facility] Progress Note    Date: 2021    Name: Daija Andrade    MRN: [id number]         : 1945    Ms. Roberto Trinidad Arrived ambulatory and in no distress for C6D1 of CMF Regimen. Assessment was completed, no acute issues at this time, no new complaints voiced. Left chest wall port accessed without difficulty, labs drawn & sent for processing. Covid questionnaire completed. 1. Do you have any symptoms of COVID-19? SOB, coughing, fever, or generally not feeling well - no    2. Have you been exposed to COVID-19 recently? - no    3. Have you had any recent contact with family/friend that has a pending COVID test? - no    Chemotherapy Flowsheet 2021   Cycle C6D1   Date 2021   Drug / Regimen CMF   Pre Meds given   Notes given     Patient proceed to appointment with Dr. Kalie Cummins. Ms. Bhagat's vitals were reviewed. Visit Vitals  /60   Pulse 75   Temp 96.9 °F (36.1 °C)   Resp 18   Ht 5' 5\" (1.651 m)   Wt 67.2 kg (148 lb 3.2 oz)   SpO2 97%   BMI 24.66 kg/m²       Lab results were obtained and reviewed. Recent Results (from the past 12 hour(s))   CBC WITH AUTOMATED DIFF    Collection Time: 21  7:47 AM   Result Value Ref Range    WBC 2.4 (L) 3.6 - 11.0 K/uL    RBC 3.34 (L) 3.80 - 5.20 M/uL    HGB 10.5 (L) 11.5 - 16.0 g/dL    HCT 32.6 (L) 35.0 - 47.0 %    MCV 97.6 80.0 - 99.0 FL    MCH 31.4 26.0 - 34.0 PG    MCHC 32.2 30.0 - 36.5 g/dL    RDW 16.9 (H) 11.5 - 14.5 %    PLATELET 078 871 - 803 K/uL    MPV 9.3 8.9 - 12.9 FL    NRBC 0.8 (H) 0  WBC    ABSOLUTE NRBC 0.02 (H) 0.00 - 0.01 K/uL    NEUTROPHILS 42 32 - 75 %    LYMPHOCYTES 17 12 - 49 %    MONOCYTES 27 (H) 5 - 13 %    EOSINOPHILS 9 (H) 0 - 7 %    BASOPHILS 1 0 - 1 %    IMMATURE GRANULOCYTES 4 (H) 0.0 - 0.5 %    ABS. NEUTROPHILS 1.1 (L) 1.8 - 8.0 K/UL    ABS. LYMPHOCYTES 0.4 (L) 0.8 - 3.5 K/UL    ABS. MONOCYTES 0.6 0.0 - 1.0 K/UL    ABS. EOSINOPHILS 0.2 0.0 - 0.4 K/UL    ABS. BASOPHILS 0.0 0.0 - 0.1 K/UL    ABS. IMM. GRANS. 0.1 (H) 0.00 - 0.04 K/UL    DF SMEAR SCANNED      RBC COMMENTS ANISOCYTOSIS  1+       METABOLIC PANEL, COMPREHENSIVE    Collection Time: 09/28/21  7:47 AM   Result Value Ref Range    Sodium 141 136 - 145 mmol/L    Potassium 3.6 3.5 - 5.1 mmol/L    Chloride 108 97 - 108 mmol/L    CO2 28 21 - 32 mmol/L    Anion gap 5 5 - 15 mmol/L    Glucose 103 (H) 65 - 100 mg/dL    BUN 14 6 - 20 MG/DL    Creatinine 0.78 0.55 - 1.02 MG/DL    BUN/Creatinine ratio 18 12 - 20      GFR est AA >60 >60 ml/min/1.73m2    GFR est non-AA >60 >60 ml/min/1.73m2    Calcium 8.7 8.5 - 10.1 MG/DL    Bilirubin, total 0.5 0.2 - 1.0 MG/DL    ALT (SGPT) 19 12 - 78 U/L    AST (SGOT) 17 15 - 37 U/L    Alk.  phosphatase 78 45 - 117 U/L    Protein, total 6.3 (L) 6.4 - 8.2 g/dL    Albumin 3.3 (L) 3.5 - 5.0 g/dL    Globulin 3.0 2.0 - 4.0 g/dL    A-G Ratio 1.1 1.1 - 2.2         Medications:  Medications Administered     0.9% sodium chloride infusion     Admin Date  09/28/2021 Action  New Bag Dose  25 mL/hr Rate  25 mL/hr Route  IntraVENous Administered By  Tashi Su RN          0.9% sodium chloride injection 10 mL     Admin Date  09/28/2021 Action  Given Dose  10 mL Route  IntraVENous Administered By  Tashi Su RN          cyclophosphamide (CYTOXAN) 1,000 mg in 0.9% sodium chloride 250 mL, overfill volume 25 mL chemo infusion     Admin Date  09/28/2021 Action  New Bag Dose  1,000 mg Rate  650 mL/hr Route  IntraVENous Administered By  Tashi Su RN          dexamethasone (DECADRON) 4 mg/mL injection 6 mg     Admin Date  09/28/2021 Action  Given Dose  6 mg Route  IntraVENous Administered By  Tasih Su RN          fluorouraciL (ADRUCIL) chemo syringe 1,000 mg     Admin Date  09/28/2021 Action  Given Dose  1,000 mg Rate  300 mL/hr Route  IntraVENous Administered By  Tashi Gilding, RN          fosaprepitant (EMEND) 150 mg in 0.9% sodium chloride 150 mL IVPB     Admin Date  09/28/2021 Action  New Bag Dose  150 mg Rate  450 mL/hr Route  IntraVENous Administered By  Ericka Trejo RN          heparin (porcine) pf 300-500 Units     Admin Date  09/28/2021 Action  Given Dose  500 Units Route  InterCATHeter Administered By  Ericka Trejo RN          methotrexate chemo syringe 70 mg     Admin Date  09/28/2021 Action  Given Dose  70 mg Rate  24 mL/hr Route  IntraVENous Administered By  Ericka Trejo RN          palonosetron HCl (ALOXI) injection 0.25 mg     Admin Date  09/28/2021 Action  Given Dose  0.25 mg Route  IntraVENous Administered By  Ericka Trejo RN          sodium chloride (NS) flush 10 mL     Admin Date  09/28/2021 Action  Given Dose  10 mL Route  IntraVENous Administered By  Ericka Trejo RN           Admin Date  09/28/2021 Action  Given Dose  10 mL Route  IntraVENous Administered By  Ericka Trejo RN                Ms. Nichole Arvizu tolerated treatment well and was discharged from [place] [address] in stable condition at 1230pm.   Port de-accessed, flushed & heparinized per protocol. She is to follow-up with the provider regarding future appointments.      Lance Bourgeois RN  September 28, 2021

## 2021-09-28 NOTE — PROGRESS NOTES
Cindy Cortes is a 64 y.o. female Follow up for the evaluation of breast cancer. 1. Have you been to the ER, urgent care clinic since your last visit? Hospitalized since your last visit? No    2. Have you seen or consulted any other health care providers outside of the [address] [place] [address] since your last visit? Include any pap smears or colon screening.  No

## 2021-10-07 ENCOUNTER — ANCILLARY PROCEDURE (OUTPATIENT)
Dept: CARDIOLOGY CLINIC | Age: 76
End: 2021-10-07
Payer: MEDICARE

## 2021-10-07 VITALS
BODY MASS INDEX: 24.49 KG/M2 | SYSTOLIC BLOOD PRESSURE: 120 MMHG | HEIGHT: 65 IN | DIASTOLIC BLOOD PRESSURE: 70 MMHG | WEIGHT: 147 LBS

## 2021-10-07 DIAGNOSIS — I45.5 SINUS PAUSE: ICD-10-CM

## 2021-10-07 DIAGNOSIS — I44.0 FIRST DEGREE AV BLOCK: ICD-10-CM

## 2021-10-07 DIAGNOSIS — Z86.79 HISTORY OF CARDIAC ARRHYTHMIA: ICD-10-CM

## 2021-10-07 DIAGNOSIS — C50.911 MALIGNANT NEOPLASM OF RIGHT FEMALE BREAST, UNSPECIFIED ESTROGEN RECEPTOR STATUS, UNSPECIFIED SITE OF BREAST (HCC): ICD-10-CM

## 2021-10-07 LAB
ECHO AO ROOT DIAM: 3.06 CM
ECHO EST RA PRESSURE: 3 MMHG
ECHO LA MAJOR AXIS: 3.46 CM
ECHO LA MINOR AXIS: 1.99 CM
ECHO LV E' LATERAL VELOCITY: 8.98 CM/S
ECHO LV E' SEPTAL VELOCITY: 7.46 CM/S
ECHO LV EDV A2C: 60.4 ML
ECHO LV EDV A4C: 69.42 ML
ECHO LV EDV BP: 66.7 ML (ref 56–104)
ECHO LV EDV INDEX A4C: 39.9 ML/M2
ECHO LV EDV INDEX BP: 38.3 ML/M2
ECHO LV EDV NDEX A2C: 34.7 ML/M2
ECHO LV EJECTION FRACTION A2C: 51 PERCENT
ECHO LV EJECTION FRACTION A4C: 57 PERCENT
ECHO LV EJECTION FRACTION BIPLANE: 54.9 PERCENT (ref 55–100)
ECHO LV ESV A2C: 29.3 ML
ECHO LV ESV A4C: 30.04 ML
ECHO LV ESV BP: 30.1 ML (ref 19–49)
ECHO LV ESV INDEX A2C: 16.8 ML/M2
ECHO LV ESV INDEX A4C: 17.3 ML/M2
ECHO LV ESV INDEX BP: 17.3 ML/M2
ECHO LV INTERNAL DIMENSION DIASTOLIC: 3.96 CM (ref 3.9–5.3)
ECHO LV INTERNAL DIMENSION SYSTOLIC: 2.82 CM
ECHO LV IVSD: 0.97 CM (ref 0.6–0.9)
ECHO LV MASS 2D: 120.7 G (ref 67–162)
ECHO LV MASS INDEX 2D: 69.4 G/M2 (ref 43–95)
ECHO LV POSTERIOR WALL DIASTOLIC: 0.98 CM (ref 0.6–0.9)
ECHO MV A VELOCITY: 58.75 CM/S
ECHO MV AREA PHT: 3.39 CM2
ECHO MV E DECELERATION TIME (DT): 224.06 MS
ECHO MV E VELOCITY: 86.51 CM/S
ECHO MV E/A RATIO: 1.47
ECHO MV E/E' LATERAL: 9.63
ECHO MV E/E' RATIO (AVERAGED): 10.62
ECHO MV E/E' SEPTAL: 11.6
ECHO MV PRESSURE HALF TIME (PHT): 64.98 MS
ECHO RIGHT VENTRICULAR SYSTOLIC PRESSURE (RVSP): 30.74 MMHG
ECHO TV REGURGITANT MAX VELOCITY: 263.33 CM/S
ECHO TV REGURGITANT PEAK GRADIENT: 27.74 MMHG

## 2021-10-07 PROCEDURE — 93325 DOPPLER ECHO COLOR FLOW MAPG: CPT | Performed by: INTERNAL MEDICINE

## 2021-10-07 PROCEDURE — 93308 TTE F-UP OR LMTD: CPT | Performed by: INTERNAL MEDICINE

## 2021-10-07 PROCEDURE — 93321 DOPPLER ECHO F-UP/LMTD STD: CPT | Performed by: INTERNAL MEDICINE

## 2021-10-12 NOTE — PROGRESS NOTES
Called patient ID verified [id number] reviewed below results per Dr Britni Burrows. ECHO - nml EF     Patient verbalized understanding.

## 2021-10-19 ENCOUNTER — APPOINTMENT (OUTPATIENT)
Dept: INFUSION THERAPY | Age: 76
End: 2021-10-19

## 2021-11-09 ENCOUNTER — APPOINTMENT (OUTPATIENT)
Dept: INFUSION THERAPY | Age: 76
End: 2021-11-09

## 2021-11-29 ENCOUNTER — OFFICE VISIT (OUTPATIENT)
Dept: ONCOLOGY | Age: 76
End: 2021-11-29
Payer: MEDICARE

## 2021-11-29 VITALS
OXYGEN SATURATION: 99 % | WEIGHT: 146 LBS | DIASTOLIC BLOOD PRESSURE: 74 MMHG | BODY MASS INDEX: 24.32 KG/M2 | HEART RATE: 51 BPM | SYSTOLIC BLOOD PRESSURE: 144 MMHG | RESPIRATION RATE: 12 BRPM | TEMPERATURE: 96.8 F | HEIGHT: 65 IN

## 2021-11-29 DIAGNOSIS — C50.411 MALIGNANT NEOPLASM OF UPPER-OUTER QUADRANT OF RIGHT BREAST IN FEMALE, ESTROGEN RECEPTOR NEGATIVE (HCC): Primary | ICD-10-CM

## 2021-11-29 DIAGNOSIS — Z17.1 MALIGNANT NEOPLASM OF UPPER-OUTER QUADRANT OF RIGHT BREAST IN FEMALE, ESTROGEN RECEPTOR NEGATIVE (HCC): Primary | ICD-10-CM

## 2021-11-29 DIAGNOSIS — Z51.11 ENCOUNTER FOR ANTINEOPLASTIC CHEMOTHERAPY: ICD-10-CM

## 2021-11-29 PROCEDURE — G0463 HOSPITAL OUTPT CLINIC VISIT: HCPCS | Performed by: NURSE PRACTITIONER

## 2021-11-29 PROCEDURE — G8399 PT W/DXA RESULTS DOCUMENT: HCPCS | Performed by: INTERNAL MEDICINE

## 2021-11-29 PROCEDURE — G8536 NO DOC ELDER MAL SCRN: HCPCS | Performed by: INTERNAL MEDICINE

## 2021-11-29 PROCEDURE — 1101F PT FALLS ASSESS-DOCD LE1/YR: CPT | Performed by: INTERNAL MEDICINE

## 2021-11-29 PROCEDURE — 1090F PRES/ABSN URINE INCON ASSESS: CPT | Performed by: INTERNAL MEDICINE

## 2021-11-29 PROCEDURE — G8420 CALC BMI NORM PARAMETERS: HCPCS | Performed by: INTERNAL MEDICINE

## 2021-11-29 PROCEDURE — G8427 DOCREV CUR MEDS BY ELIG CLIN: HCPCS | Performed by: INTERNAL MEDICINE

## 2021-11-29 PROCEDURE — G8510 SCR DEP NEG, NO PLAN REQD: HCPCS | Performed by: INTERNAL MEDICINE

## 2021-11-29 PROCEDURE — 99214 OFFICE O/P EST MOD 30 MIN: CPT | Performed by: INTERNAL MEDICINE

## 2021-11-29 NOTE — PROGRESS NOTES
Chief Complaint   Patient presents with    Follow-up     9 week    Breast Cancer     Visit Vitals  BP (!) 144/74 (BP 1 Location: Left upper arm, BP Patient Position: Sitting)   Pulse (!) 51   Temp 96.8 °F (36 °C) (Temporal)   Resp 12   Ht 5' 5\" (1.651 m)   Wt 146 lb (66.2 kg)   SpO2 99%   BMI 24.30 kg/m²

## 2021-11-29 NOTE — PROGRESS NOTES
[facility]  [address] [facility] [phone]  F: [phone]      Reason for Visit:   Lynn Daigle is a 64 y.o. female who is seen in follow up for evaluation of therapy for breast cancer. Breast surgery: Dr. Rita Lopez    Treatment History:   · 3/12/21 GEJ bx:  Squamocolumnar mucosa with reactive changes  · 21 right breast 1:00 core bx:  IDC, gr 3, 1.3 cm, gr 3, ER negative, ID negative, HER 2 negative at [facility] 1+  · 21 right breast lumpectomy: IDC with medullary features, 1.9 cm, gr 3, 0/1 LN, ER negative, ID negative, HER 2 negative at IHC 0, pT1c pN0 cM0  · CMF 6/15/21-21    History of Present Illness: An abnormal mammogram led to the pathology above    Interval history:  Presents today for follow up and treatment. She reports gr 1 fatigue, gr 1 hair loss, gr 1 loss of concentration, gr 1 anxiety/depression, gr 1 cough, gr 1 dyspnea, gr 1 itching, gr 1 neuropathy, gr 1 swelling, gr 1 urinary frequency/incontinence. She had cataract surgery last week.      FH:  Maternal aunt with possible breast cancer at an unknown age; sister with ovarian cancer in her 62s; no prostate or pancreas cancer    Past Medical History:   Diagnosis Date    Allergic rhinitis due to other allergen     Arthritis     Cancer ([address]     right breast    Hypercholesterolemia     Psychiatric disorder     anxiety    Seizures ([address]     \"with stress\" last was       Past Surgical History:   Procedure Laterality Date    HX BREAST LUMPECTOMY Right 2021    RIGHT BREAST LUMPECTOMY, ABNER CATH INSERTION, RIGHT BREAST SENTINEL NODE BIOPSY performed by Isabel Clark MD at [address] HX COLONOSCOPY      HX SALPINGO-OOPHORECTOMY Left     at about age 26      Social History     Tobacco Use    Smoking status: Never Smoker    Smokeless tobacco: Never Used   Substance Use Topics    Alcohol use: No      Family History Problem Relation Age of Onset    Diabetes Mother     Cancer Sister     Cancer Brother     Breast Cancer Maternal Aunt      Current Outpatient Medications   Medication Sig    escitalopram oxalate (LEXAPRO) 20 mg tablet Take 1 Tablet by mouth daily. Indications: anxiousness associated with depression    lidocaine-prilocaine (EMLA) topical cream Apply  to affected area as needed for Pain.  alendronate (FOSAMAX) 35 mg tablet Take 70 mg by mouth every seven (7) days. mondays    timoloL (BETIMOL) 0.25 % ophthalmic solution Administer 1-2 Drops to both eyes two (2) times a day. use in affected eye(s)    latanoprost (XALATAN) 0.005 % ophthalmic solution Administer 1 Drop to both eyes daily.  prochlorperazine (COMPAZINE) 10 mg tablet Take 1 Tablet by mouth every six (6) hours as needed for Nausea. (Patient not taking: Reported on 11/29/2021)    ondansetron hcl (ZOFRAN) 8 mg tablet Take 1 Tablet by mouth every eight (8) hours as needed for Nausea. (Patient not taking: Reported on 11/29/2021)     No current facility-administered medications for this visit. Allergies   Allergen Reactions    Codeine Other (comments)     Other reaction(s): Other (see comments)  Hallucinations        Review of Systems: A complete review of systems was obtained, negative except as described above.     Physical Exam:     Visit Vitals  BP (!) 144/74 (BP 1 Location: Left upper arm, BP Patient Position: Sitting)   Pulse (!) 51   Temp 96.8 °F (36 °C) (Temporal)   Resp 12   Ht 5' 5\" (1.651 m)   Wt 146 lb (66.2 kg)   LMP 11/15/1990 (Exact Date)   SpO2 99%   BMI 24.30 kg/m²     ECOG PS: 0    Constitutional: Appears well-developed and well-nourished in no apparent distress      Mental status: Alert and awake, Oriented to person/place/time, Able to follow commands    Eyes: EOM normal, Sclera normal, No visible ocular discharge    HENT: Normocephalic, atraumatic    Mouth/Throat: Moist mucous membranes    External Ears normal    Neck: No visualized mass    Pulmonary/Chest: Respiratory effort normal, No visualized signs of difficulty breathing or respiratory distress    Musculoskeletal: Normal gait with no signs of ataxia, Normal range of motion of neck    Neurological: No facial asymmetry (Cranial nerve 7 motor function), No gaze palsy    Skin: No significant exanthematous lesions or discoloration noted on facial skin    Psychiatric: Normal affect, No hallucinations       Results:     Lab Results   Component Value Date/Time    WBC 2.4 (L) 09/28/2021 07:47 AM    HGB 10.5 (L) 09/28/2021 07:47 AM    HCT 32.6 (L) 09/28/2021 07:47 AM    PLATELET 781 65/98/7529 07:47 AM    MCV 97.6 09/28/2021 07:47 AM    ABS. NEUTROPHILS 1.1 (L) 09/28/2021 07:47 AM     Lab Results   Component Value Date/Time    Sodium 141 09/28/2021 07:47 AM    Potassium 3.6 09/28/2021 07:47 AM    Chloride 108 09/28/2021 07:47 AM    CO2 28 09/28/2021 07:47 AM    Glucose 103 (H) 09/28/2021 07:47 AM    BUN 14 09/28/2021 07:47 AM    Creatinine 0.78 09/28/2021 07:47 AM    GFR est AA >60 09/28/2021 07:47 AM    GFR est non-AA >60 09/28/2021 07:47 AM    Calcium 8.7 09/28/2021 07:47 AM     Lab Results   Component Value Date/Time    Bilirubin, total 0.5 09/28/2021 07:47 AM    ALT (SGPT) 19 09/28/2021 07:47 AM    Alk. phosphatase 78 09/28/2021 07:47 AM    Protein, total 6.3 (L) 09/28/2021 07:47 AM    Albumin 3.3 (L) 09/28/2021 07:47 AM    Globulin 3.0 09/28/2021 07:47 AM     3/30/21 dexa     This patient is osteoporotic using the World Health Organization criteria  As compared to the prior study, there has been a significant 5.6% increase in  the left total hip and a significant 9% increase in the right total hip    4/12/21 right breast US  IMPRESSION should read \"BI-RADS 4. Suspicious abnormality. Recommend histologic  correlation. 1.8 cm x 1.6 cm x 1.2 cm 10:00 right breast lesion. Records reviewed and summarized above. Pathology report(s) reviewed above.   Radiology report(s) reviewed above.      Assessment/plan:   1. Right breast IDC, gr 3, triple negative, cT1c cN0 cM0, stage IA, prognostic stage IB    · 5/12/21 right breast lumpectomy: IDC with medullary features, 1.9 cm, gr 3, 0/1 LN, ER negative, IN negative, HER 2 negative at IHC 0, pT1c pN0 cM0    We explained to the patient that the goal of systemic adjuvant therapy is to improve the chances for cure and decrease the risk of relapse. We explained why a patient can have microscopic cancer spread now even though physical examination, laboratory studies and imaging studies are negative for cancer. We explained that the same treatments used now as adjuvant or preventive treatments rarely if ever are curative in women who develop metastases. Using eprognosis. org, her 5 year all cause mortality risk is 9-15%; her 10 year all cause mortality risk is 34-43%; her median OS is 12-14 years. An adjuvant discussion is warranted. No falls in past 6 months    s/p IV CMF q 3 weeks x 6.      Discussed increased risk of death with COVID19 and precautions to take with chemotherapy. Received COVID vaccination on 5/19/21.     She presents today for follow up. She is doing well with MIRIAN. She will schedule for follow up and port removal with Dr. Johana Hogan. Follow-up after early breast cancer was discussed. I recommend follow-up as defined by the American Society of Clinical Oncology and [facility]. This includes a visit to a health care professional every 3-6 months for 3 years, then every 6-12 months for 2 years, and then yearly as well as mammograms yearly. Will refer to rad onc for consideration of XRT    2. Emotional well being:  She has excellent support and is coping well with her disease    3. Osteoporosis:  On fosamax and vit D 50,000 units weekly    4. Osteoarthritis: She has been off Diclofenac due to chemotherapy. Previously taking twice daily. Tried Aleve PRN, but doesn't feel this is helping.   Will be able to re-start Diclofenac. 5. Anemia:  Mild, due to chemo. 6. Mood Swings: she is on lexapro 20 mg daily; discussed support groups and counseling. Improved off of dexamethasone but has had two recent deaths in the family. This patient was seen in conjunction with Jony Hastings NP. I personally reviewed the history and all points in the assessment and plan, and performed key points on the exam.   Right breast TN breast cancer, s/p chemo. Dr. Johana Hogan to remove port. Will refer her to rad onc, discussed in detail today. RTC 6 months. Continue fosamax for osteoporosis        Signed By: Tania Lafleur MD      No orders of the defined types were placed in this encounter.

## 2021-12-07 DIAGNOSIS — C50.911 MALIGNANT NEOPLASM OF RIGHT FEMALE BREAST, UNSPECIFIED ESTROGEN RECEPTOR STATUS, UNSPECIFIED SITE OF BREAST (HCC): Primary | ICD-10-CM

## 2021-12-09 ENCOUNTER — TELEPHONE (OUTPATIENT)
Dept: SURGERY | Age: 76
End: 2021-12-09

## 2021-12-14 NOTE — TELEPHONE ENCOUNTER
Patient given information for port removal 12/20/21:  Artem Aguilera at [facility] at 8:00 am 2nd floor.

## 2021-12-17 ENCOUNTER — HOSPITAL ENCOUNTER (OUTPATIENT)
Dept: PREADMISSION TESTING | Age: 76
Discharge: HOME | End: 2021-12-17
Payer: MEDICARE

## 2021-12-17 PROCEDURE — U0005 INFEC AGEN DETEC AMPLI PROBE: HCPCS

## 2021-12-17 RX ORDER — DIPHENHYDRAMINE HYDROCHLORIDE 50 MG/ML
12.5 INJECTION, SOLUTION INTRAMUSCULAR; INTRAVENOUS AS NEEDED
Status: CANCELLED | OUTPATIENT
Start: 2021-12-17 | End: 2021-12-17

## 2021-12-17 RX ORDER — OXYCODONE HYDROCHLORIDE 5 MG/1
5 TABLET ORAL AS NEEDED
Status: CANCELLED | OUTPATIENT
Start: 2021-12-17

## 2021-12-17 RX ORDER — HYDROMORPHONE HYDROCHLORIDE 1 MG/ML
.25-1 INJECTION, SOLUTION INTRAMUSCULAR; INTRAVENOUS; SUBCUTANEOUS
Status: CANCELLED | OUTPATIENT
Start: 2021-12-17

## 2021-12-17 RX ORDER — SODIUM CHLORIDE 0.9 % (FLUSH) 0.9 %
5-40 SYRINGE (ML) INJECTION AS NEEDED
Status: CANCELLED | OUTPATIENT
Start: 2021-12-17

## 2021-12-17 RX ORDER — SODIUM CHLORIDE 0.9 % (FLUSH) 0.9 %
5-40 SYRINGE (ML) INJECTION EVERY 8 HOURS
Status: CANCELLED | OUTPATIENT
Start: 2021-12-17

## 2021-12-17 RX ORDER — SODIUM CHLORIDE, SODIUM LACTATE, POTASSIUM CHLORIDE, CALCIUM CHLORIDE 600; 310; 30; 20 MG/100ML; MG/100ML; MG/100ML; MG/100ML
100 INJECTION, SOLUTION INTRAVENOUS CONTINUOUS
Status: CANCELLED | OUTPATIENT
Start: 2021-12-17

## 2021-12-19 LAB
SARS-COV-2, XPLCVT: NOT DETECTED
SOURCE, COVRS: NORMAL

## 2021-12-20 ENCOUNTER — HOSPITAL ENCOUNTER (OUTPATIENT)
Age: 76
Setting detail: OUTPATIENT SURGERY
Discharge: HOME | End: 2021-12-20
Attending: SURGERY | Admitting: SURGERY
Payer: MEDICARE

## 2021-12-20 VITALS
OXYGEN SATURATION: 99 % | DIASTOLIC BLOOD PRESSURE: 75 MMHG | WEIGHT: 142.86 LBS | SYSTOLIC BLOOD PRESSURE: 152 MMHG | HEART RATE: 62 BPM | BODY MASS INDEX: 22.96 KG/M2 | HEIGHT: 66 IN | TEMPERATURE: 98.3 F | RESPIRATION RATE: 17 BRPM

## 2021-12-20 DIAGNOSIS — Z85.3 HX: BREAST CANCER: ICD-10-CM

## 2021-12-20 DIAGNOSIS — C50.911 MALIGNANT NEOPLASM OF RIGHT FEMALE BREAST, UNSPECIFIED ESTROGEN RECEPTOR STATUS, UNSPECIFIED SITE OF BREAST (HCC): ICD-10-CM

## 2021-12-20 PROCEDURE — 77030040922 HC BLNKT HYPOTHRM STRY -A

## 2021-12-20 PROCEDURE — 36590 REMOVAL TUNNELED CV CATH: CPT | Performed by: SURGERY

## 2021-12-20 PROCEDURE — 74011000250 HC RX REV CODE- 250: Performed by: SURGERY

## 2021-12-20 PROCEDURE — 2709999900 HC NON-CHARGEABLE SUPPLY: Performed by: SURGERY

## 2021-12-20 PROCEDURE — 76030000002 HC AMB SURG OR TIME FIRST 0.: Performed by: SURGERY

## 2021-12-20 PROCEDURE — 76210000046 HC AMBSU PH II REC FIRST 0.5 HR: Performed by: SURGERY

## 2021-12-20 RX ORDER — SODIUM CHLORIDE, SODIUM LACTATE, POTASSIUM CHLORIDE, CALCIUM CHLORIDE 600; 310; 30; 20 MG/100ML; MG/100ML; MG/100ML; MG/100ML
125 INJECTION, SOLUTION INTRAVENOUS CONTINUOUS
Status: DISCONTINUED | OUTPATIENT
Start: 2021-12-20 | End: 2021-12-20 | Stop reason: HOSPADM

## 2021-12-20 RX ORDER — LIDOCAINE HYDROCHLORIDE 10 MG/ML
0.1 INJECTION, SOLUTION EPIDURAL; INFILTRATION; INTRACAUDAL; PERINEURAL AS NEEDED
Status: DISCONTINUED | OUTPATIENT
Start: 2021-12-20 | End: 2021-12-20 | Stop reason: HOSPADM

## 2021-12-20 RX ORDER — SODIUM CHLORIDE 0.9 % (FLUSH) 0.9 %
5-40 SYRINGE (ML) INJECTION EVERY 8 HOURS
Status: DISCONTINUED | OUTPATIENT
Start: 2021-12-20 | End: 2021-12-20 | Stop reason: HOSPADM

## 2021-12-20 RX ORDER — BUPIVACAINE HYDROCHLORIDE AND EPINEPHRINE 5; 5 MG/ML; UG/ML
INJECTION, SOLUTION EPIDURAL; INTRACAUDAL; PERINEURAL AS NEEDED
Status: DISCONTINUED | OUTPATIENT
Start: 2021-12-20 | End: 2021-12-20 | Stop reason: HOSPADM

## 2021-12-20 RX ORDER — SODIUM CHLORIDE 0.9 % (FLUSH) 0.9 %
5-40 SYRINGE (ML) INJECTION AS NEEDED
Status: DISCONTINUED | OUTPATIENT
Start: 2021-12-20 | End: 2021-12-20 | Stop reason: HOSPADM

## 2021-12-20 RX ORDER — FLUMAZENIL 0.1 MG/ML
0.2 INJECTION INTRAVENOUS
Status: DISCONTINUED | OUTPATIENT
Start: 2021-12-20 | End: 2021-12-20 | Stop reason: HOSPADM

## 2021-12-20 RX ORDER — NALOXONE HYDROCHLORIDE 0.4 MG/ML
0.2 INJECTION, SOLUTION INTRAMUSCULAR; INTRAVENOUS; SUBCUTANEOUS
Status: DISCONTINUED | OUTPATIENT
Start: 2021-12-20 | End: 2021-12-20 | Stop reason: HOSPADM

## 2021-12-20 NOTE — H&P
History and Physical    Mike Choi is a 64 y.o. female who presents for portacath removal.  She completed chemotherapy for triple negative breast cancer. 4/2021 RIGHT 19mm invasive ductal carcinoma with medullary features, grade 3, 0/1 nodes, triple negative  CMF  Physical Exam  Constitutional:       Appearance: She is well-developed. Cardiovascular:      Rate and Rhythm: Normal rate and regular rhythm. Heart sounds: Normal heart sounds. Pulmonary:      Effort: Pulmonary effort is normal.      Breath sounds: Normal breath sounds. Chest:   Breasts: Breasts are symmetrical.      Right: No mass, nipple discharge, skin change or supraclavicular adenopathy. Left: No mass, nipple discharge, skin change or supraclavicular adenopathy. Lymphadenopathy:      Cervical: No cervical adenopathy. Upper Body:      Right upper body: No supraclavicular adenopathy. Left upper body: No supraclavicular adenopathy. Skin:     General: Skin is warm and dry. Neurological:      Mental Status: She is alert and oriented to person, place, and time. Past Medical History:   Diagnosis Date    Allergic rhinitis due to other allergen     Arthritis     Cancer ([address]     right breast    Hypercholesterolemia     Psychiatric disorder     anxiety    [address] [address]     \"with stress\" last was 2017     Past Surgical History:   Procedure Laterality Date    HX BREAST LUMPECTOMY Right 5/12/2021    RIGHT BREAST LUMPECTOMY, ABNER CATH INSERTION, RIGHT BREAST SENTINEL NODE BIOPSY performed by Rachele Godinez MD at [address] HX COLONOSCOPY  2021    HX SALPINGO-OOPHORECTOMY Left     at about age 26      OB History    No obstetric history on file.        Family History   Problem Relation Age of Onset    Diabetes Mother     Cancer Sister     Cancer Brother     Breast Cancer Maternal Aunt      Social History     Tobacco Use    Smoking status: Never Smoker    Smokeless tobacco: Never Used   Substance Use Topics    Alcohol use: No      Prior to Admission medications    Medication Sig Start Date End Date Taking? Authorizing Provider   escitalopram oxalate (LEXAPRO) 20 mg tablet Take 1 Tablet by mouth daily. Indications: anxiousness associated with depression 8/17/21  Yes Steven Villarreal MD   lidocaine-prilocaine (EMLA) topical cream Apply  to affected area as needed for Pain. 6/1/21  Yes Steven Villarreal MD   alendronate (FOSAMAX) 35 mg tablet Take 70 mg by mouth every seven (7) days. mondays   Yes Provider, Historical   timoloL (BETIMOL) 0.25 % ophthalmic solution Administer 1-2 Drops to both eyes two (2) times a day. use in affected eye(s)   Yes Other, MD Radha   latanoprost (XALATAN) 0.005 % ophthalmic solution Administer 1 Drop to both eyes daily. Yes Provider, Historical   prochlorperazine (COMPAZINE) 10 mg tablet Take 1 Tablet by mouth every six (6) hours as needed for Nausea. Patient not taking: Reported on 11/29/2021 6/1/21   Steven Villarreal MD   ondansetron hcl [facility]) 8 mg tablet Take 1 Tablet by mouth every eight (8) hours as needed for Nausea. Patient not taking: Reported on 11/29/2021 6/1/21   Steven Villarreal MD      Allergies   Allergen Reactions    Codeine Other (comments)     Other reaction(s):  Other (see comments)  Hallucinations     Impression: completed chemotherapy for triple negative chemotherapy  Plan; remove portacat

## 2021-12-20 NOTE — OP NOTES
[address] [address]    Name: Jazlyn West  : 1945    Portacath Removal   Operative Report     Date of Surgery: 2021  Preoperative Diagnosis: Hx of RIGHT breast cancer, triple negative  Postoperative Diagnosis:  same  Surgeon:   Hiram Chaney MD  Anesthesia: Local  Procedure:   PORT-A-CATH REMOVAL   Indication: completed chemotherapy    Procedure Note:   Pt was taken to the operating room and placed on the table in a supine position. The portacath was in the Left upper chest.   0.5% Marcaine with epinephrine was injected at the portacath scar. The old scar was opened. The port, catheter and clamp were removed. The catheter tract was closed with a figure of eight 3-0 vicryl suture. Hemostasis was controlled with electrocautery. The dermis was re-approximated with 3-0 Vicryl and the skin was closed with 4-0 Monocryl. The wound was dressed with skin glue and the patient was taken to the recovery room. Sponge needle and instrument counts were reported to be correct.    Estimated Blood Loss:   5 cc  Complications: none  Implants: none  Assistant: none    Signed:  Daniel Finley MD

## 2021-12-20 NOTE — DISCHARGE INSTRUCTIONS
Discharge Instructions from Dr. Rita Lopez    Diet:Regular  Activity: As tolerated. Wound care: Okay to shower or take a bath. Dressing: The skin glue is waterproof. It is okay to wash normally at this site. If the glue is still present after 10 days you should peel it off. Pain:  You may use an ice pack and/or over the counter pain medication (Ibuprofen, acetaminophen etc) if needed. Follow up:  [facility] 2022 FOR YOUR MAMMOGRAM AND VISIT WITH DR Michelle Pulido    CALL [phone] TO SCHEDULED AN APPOINTMENT WITH DR Fraire Camden [phone] TO SCHEDULE YOUR MAMMOGRAM AT [facility] which is in the same buiding as Dr Rita Lopez' office. These appointments can be on the same day. Problems/Questions: Call Elvia Ward MD on my cell phone. [phone]      DISCHARGE SUMMARY from your Nurse      PATIENT INSTRUCTIONS    Report the following to your surgeon:  · Excessive pain, swelling, redness or odor of or around the surgical area  · Temperature over 100.5  · Nausea and vomiting lasting longer than 4 hours or if unable to take medications  · Any signs of decreased circulation or nerve impairment to extremity: change in color, persistent  numbness, tingling, coldness or increase pain  · Any questions      GOOD HELP TO FIGHT AN INFECTION  Here are a few tip to help reduce the chance of getting an infection after surgery:   Wash Your Hands   Good handwashing is the most important thing you and your caregiver can do.  Wash before and after caring for any wounds. Dry your hand with a clean towel.  Wash with soap and water for at least 20 seconds. A TIP: sing the \"Happy Birthday\" song through one time while washing to help with the timing.  Use a hand  in between washings.      Shower   When your surgeon says it is OK to take a shower, use a new bar of antibacterial soap (if that is what you use, and keep that bar of soap ONLY for your use), or antibacterial body wash.   Use a clean wash cloth or sponge when you bathe.  Dry off with a clean towel  after every bath - be careful around any wounds, skin staples, sutures or surgical glue over/on wounds.  Do not enter swimming pools, hot tubs, lakes, rivers and/or ocean until wounds are healed and your doctor/surgeon says it is OK.  Use Clean Sheets   Sleep on freshly laundered sheets after your surgery.  Keep the surgery site covered with a clean, dry bandage (if instructed to do so). If the bandage becomes soiled, reapply a new, dry, clean bandage.  Do not allow pets to sleep with you while your wound is healing.  Lifestyle Modification and Controlling Your Blood Sugar   Smoking slows wound healing. Stop smoking and limit exposure to second-hand smoke.  High blood sugar slows wound healing. Eat a well-balanced diet to provide proper nutrition while healing   Monitor your blood sugar (if you are a diabetic) and take your medications as you are suppose to so you can control you blood sugar after surgery. COUGH AND DEEP BREATHE    Breathing deeply and coughing are very important exercises to do after surgery. Deep breathing and coughing open the little air tubes and air sacks in your lungs. You take deep breaths every day. You may not even notice - it is just something you do when you sigh or yawn. It is a natural exercise you do to keep these air passages open. After surgery, take deep breaths and cough, on purpose. DIRECTIONS:  · Take 10 to 15 slow deep breaths every hour while awake. · Breathe in deeply, and hold it for 2 seconds. · Exhale slowly through puckered lips, like blowing up a balloon. · After every 4th or 5th deep breath, hug your pillow to your chest or belly and give a hard, deep cough. Yes, it will probably hurt. But doing this exercise is a very important part of healing after surgery.   Take your pain medicine to help you do this exercise without too much pain.    Coughing and deep breathing help prevent bronchitis and pneumonia after surgery. If you had chest or belly surgery, use a pillow as a \"hug florencia\" and hold it tightly to your chest or belly when you cough. ANKLE PUMPS    Ankle pumps increase the circulation of oxygenated blood to your lower extremities and decrease your risk for circulation problems such as blood clots. They also stretch the muscles, tendons and ligaments in your foot and ankle, and prevent joint contracture in the ankle and foot, especially after surgeries on the legs. It is important to do ankle pump exercises regularly after surgery because immobility increases your risk for developing a blood clot. Your doctor may also have you take an Aspirin for the next few days as well. If your doctor did not ask you to take an Aspirin, consult with him before starting Aspirin therapy on your own. The exercise is quite simple. · Slowly point your foot forward, feeling the muscles on the top of your lower leg stretch, and hold this position for 5 seconds. · Next, pull your foot back toward you as far as possible, stretching the calf muscles, and hold that position for 5 seconds. · Repeat with the other foot. · Perform 10 repetitions every hour while awake for both ankles if possible (down and then up with the foot once is one repetition). You should feel gentle stretching of the muscles in your lower leg when doing this exercise. If you feel pain, or your range of motion is limited, don't push too hard. Only go the limit your joint and muscles will let you go. If you have increasing pain, progressively worsening leg warmth or swelling, STOP the exercise and call your doctor. These are general instructions for a healthy lifestyle:    *   Please give a list of your current medications to your Primary Care Provider.   *   Please update this list whenever your medications are discontinued, doses are changed, or new medications (including over-the-counter products) are added. *   Please carry medication information at all times in case of emergency situations. About Smoking  No smoking / No tobacco products  Avoid exposure to second hand smoke     Surgeon General's Warning:  Quitting smoking now greatly reduces serious risk to your health. Obesity, smoking, and sedentary lifestyle greatly increases your risk for illness and disease. A healthy diet, regular physical exercise & weight monitoring are important for maintaining a healthy lifestyle. Congestive Heart Failure  You may be retaining fluid if you have a history of heart failure or if you experience any of the following symptoms:  Weight gain of 3 pounds or more overnight or 5 pounds in a week, increased swelling in your hands or feet or shortness of breath while lying flat in bed. Please call your doctor as soon as you notice any of these symptoms; do not wait until your next office visit. Recognize signs and symptoms of STROKE:  F -  Face looks uneven  A -  Arms unable to move or move evenly  S -  Speech slurred or non-existent  T -  Time-call 911 as soon as signs and symptoms begin-DO NOT go          back to bed or wait to see if you get better-TIME IS BRAIN. Warning Signs of HEART ATTACK   Call 911 if you have these symptoms:     Chest discomfort. Most heart attacks involve discomfort in the center of the chest that lasts more than a few minutes, or that goes away and comes back. It can feel like uncomfortable pressure, squeezing, fullness, or pain.  Discomfort in other areas of the upper body. Symptoms can include pain or discomfort in one or both arms, the back, neck, jaw, or stomach.  Shortness of breath with or without chest discomfort.  Other signs may include breaking out in a cold sweat, nausea, or lightheadedness. Don't wait more than five minutes to call 911 - MINUTES MATTER!  Fast action can save your life. Calling 911 is almost always the fastest way to get lifesaving treatment. Emergency Medical Services staff can begin treatment when they arrive -- up to an hour sooner than if someone gets to the hospital by car. Learning About Coronavirus [phone])  Coronavirus [phone]): Overview  What is coronavirus (COVID-19)? The coronavirus disease (COVID-19) is caused by a virus. It is an illness that was first found in [place], [place], in December 2019. It has since spread worldwide. The virus can cause fever, cough, and trouble breathing. In severe cases, it can cause pneumonia and make it hard to breathe without help. It can cause death. Coronaviruses are a large group of viruses. They cause the common cold. They also cause more serious illnesses like Middle East respiratory syndrome (MERS) and severe acute respiratory syndrome (SARS). COVID-19 is caused by a novel coronavirus. That means it's a new type that has not been seen in people before. This virus spreads person-to-person through droplets from coughing and sneezing. It can also spread when you are close to someone who is infected. And it can spread when you touch something that has the virus on it, such as a doorknob or a tabletop. What can you do to protect yourself from coronavirus (COVID-19)? The best way to protect yourself from getting sick is to:  · Avoid areas where there is an outbreak. · Avoid contact with people who may be infected. · Wash your hands often with soap or alcohol-based hand sanitizers. · Avoid crowds and try to stay at least 6 feet away from other people. · Wash your hands often, especially after you cough or sneeze. Use soap and water, and scrub for at least 20 seconds. If soap and water aren't available, use an alcohol-based hand . · Avoid touching your mouth, nose, and eyes. What can you do to avoid spreading the virus to others?   To help avoid spreading the virus to others:  · Cover your mouth with a tissue when you cough or sneeze. Then throw the tissue in the trash. · Use a disinfectant to clean things that you touch often. · Stay home if you are sick or have been exposed to the virus. Don't go to school, work, or public areas. And don't use public transportation. · If you are sick:  ? Leave your home only if you need to get medical care. But call the doctor's office first so they know you're coming. And wear a face mask, if you have one.  ? If you have a face mask, wear it whenever you're around other people. It can help stop the spread of the virus when you cough or sneeze. ? Clean and disinfect your home every day. Use household  and disinfectant wipes or sprays. Take special care to clean things that you grab with your hands. These include doorknobs, remote controls, phones, and handles on your refrigerator and microwave. And don't forget countertops, tabletops, bathrooms, and computer keyboards. When to call for help  Call 911 anytime you think you may need emergency care. For example, call if:  · You have severe trouble breathing. (You can't talk at all.)  · You have constant chest pain or pressure. · You are severely dizzy or lightheaded. · You are confused or can't think clearly. · Your face and lips have a blue color. · You pass out (lose consciousness) or are very hard to wake up. Call your doctor now if you develop symptoms such as:  · Shortness of breath. · Fever. · Cough. If you need to get care, call ahead to the doctor's office for instructions before you go. Make sure you wear a face mask, if you have one, to prevent exposing other people to the virus. Where can you get the latest information? The following health organizations are tracking and studying this virus. Their websites contain the most up-to-date information. Jennifer Abreu also learn what to do if you think you may have been exposed to the virus. · U.[place]  Centers for Disease Control and Prevention (CDC): The CDC provides updated news about the disease and travel advice. The website also tells you how to prevent the spread of infection. www.cdc.gov  · World Health Organization [facility]): WHO offers information about the virus outbreaks. WHO also has travel advice. www.who.int  Current as of: April 1, 2020               Content Version: 12.4  © 2006-2020 Healthwise, Incorporated. Care instructions adapted under license by your healthcare professional. If you have questions about a medical condition or this instruction, always ask your healthcare professional. [address] any warranty or liability for your use of this information. The discharge information has been reviewed with the {PATIENT PARENT GUARDIAN:76422}. Any questions and concerns from the {PATIENT PARENT GUARDIAN:53542} have been addressed. The {PATIENT PARENT GUARDIAN:58809} verbalized understanding. CONTENTS FOUND IN YOUR DISCHARGE ENVELOPE:  [x]     Surgeon and Hospital Discharge Instructions  [x]     [facility] Care Provider Card  []     Medication & Side Effect Guide            (your newly prescribed medications have been marked/highlighted showing the most common side effects from   the classes of drugs on your prescriptions)  []     Medication Prescription(s) x *** ( [] These have been sent electronically to your pharmacy by your surgeon,   - OR -       your surgeon has already provided these to you during a previous/pre-op office visit)  []     [address]  []     Physical Therapy Prescription  []     Follow-up Appointment Cards  []     Surgery-related Pictures/Media  []     Pain block and/or block with On-Q Catheter from Anesthesia Service (information included in your instructions above)  []     Medical device information sheets/pamphlets from their    []     School/work excuse note. []     /parent work excuse note.       The following personal items collected during your admission are returned to you:   Dental Appliance:    Vision:    Hearing Aid:    Jewelry: Jewelry: None  Clothing: Clothing: Undergarments,Footwear,Pants,Shirt,Hat,Jacket/Coat  Other Valuables:  Other Valuables: None  Valuables sent to safe:

## 2022-01-07 RX ORDER — ESCITALOPRAM OXALATE 20 MG/1
TABLET ORAL
Qty: 30 TABLET | Refills: 0 | Status: SHIPPED | OUTPATIENT
Start: 2022-01-07 | End: 2022-02-03

## 2022-01-10 ENCOUNTER — TELEPHONE (OUTPATIENT)
Dept: ONCOLOGY | Age: 77
End: 2022-01-10

## 2022-01-10 NOTE — TELEPHONE ENCOUNTER
Called patient per staff message and left VM letting patient know she needs to call radiation to get an appointment scheduled per referral no answer left VM providing office number to radiation.

## 2022-01-13 ENCOUNTER — TELEPHONE (OUTPATIENT)
Dept: ONCOLOGY | Age: 77
End: 2022-01-13

## 2022-01-13 NOTE — TELEPHONE ENCOUNTER
PT's  called stating that she was to be referred to radiation but doesn't want to do it- please advise a CB# [phone]

## 2022-01-13 NOTE — TELEPHONE ENCOUNTER
[address] at [place]  [phone]    01/13/2022--This user called and spoke with patient's , he stated that patient decided she does not want to do Radiation anymore, I asked him why and he said that patient said she did not see the need for it and is still trying to get her head \"Straight\" from Chemo. I let him know that I would let Barbie Davis know. He verbalized understanding and did not have any question's or concerns at that time.

## 2022-02-03 ENCOUNTER — OFFICE VISIT (OUTPATIENT)
Dept: CARDIOLOGY CLINIC | Age: 77
End: 2022-02-03
Payer: MEDICARE

## 2022-02-03 VITALS
WEIGHT: 142 LBS | BODY MASS INDEX: 22.82 KG/M2 | HEIGHT: 66 IN | HEART RATE: 64 BPM | SYSTOLIC BLOOD PRESSURE: 130 MMHG | OXYGEN SATURATION: 94 % | DIASTOLIC BLOOD PRESSURE: 72 MMHG

## 2022-02-03 DIAGNOSIS — Z86.79 HISTORY OF CARDIAC ARRHYTHMIA: Primary | ICD-10-CM

## 2022-02-03 PROCEDURE — G0463 HOSPITAL OUTPT CLINIC VISIT: HCPCS | Performed by: INTERNAL MEDICINE

## 2022-02-03 PROCEDURE — G8399 PT W/DXA RESULTS DOCUMENT: HCPCS | Performed by: INTERNAL MEDICINE

## 2022-02-03 PROCEDURE — G8427 DOCREV CUR MEDS BY ELIG CLIN: HCPCS | Performed by: INTERNAL MEDICINE

## 2022-02-03 PROCEDURE — 99213 OFFICE O/P EST LOW 20 MIN: CPT | Performed by: INTERNAL MEDICINE

## 2022-02-03 PROCEDURE — 1101F PT FALLS ASSESS-DOCD LE1/YR: CPT | Performed by: INTERNAL MEDICINE

## 2022-02-03 PROCEDURE — 1090F PRES/ABSN URINE INCON ASSESS: CPT | Performed by: INTERNAL MEDICINE

## 2022-02-03 PROCEDURE — G8536 NO DOC ELDER MAL SCRN: HCPCS | Performed by: INTERNAL MEDICINE

## 2022-02-03 PROCEDURE — G8420 CALC BMI NORM PARAMETERS: HCPCS | Performed by: INTERNAL MEDICINE

## 2022-02-03 PROCEDURE — G8510 SCR DEP NEG, NO PLAN REQD: HCPCS | Performed by: INTERNAL MEDICINE

## 2022-02-03 RX ORDER — ESCITALOPRAM OXALATE 20 MG/1
TABLET ORAL
Qty: 30 TABLET | Refills: 0 | Status: SHIPPED | OUTPATIENT
Start: 2022-02-03 | End: 2022-03-03

## 2022-02-03 NOTE — PROGRESS NOTES
Chen Eckert is a 64 y.o. female    Chief Complaint   Patient presents with    Follow-up     Hx Cardiac Arrhythmia        Chest pain some slight muscle pain every once in a while     SOB No    Dizziness some slight dizziness     Swelling patient states some swelling in her ankles     Refills No    Visit Vitals  /72 (BP 1 Location: Left upper arm, BP Patient Position: Sitting)   Pulse 64   Ht 5' 5.5\" (1.664 m)   Wt 142 lb (64.4 kg)   LMP 11/15/1990 (Exact Date)   SpO2 94%   BMI 23.27 kg/m²       1. Have you been to the ER, urgent care clinic since your last visit? Hospitalized since your last visit? No    2. Have you seen or consulted any other health care providers outside of the [address] [place] [address] since your last visit? Include any pap smears or colon screening.   No

## 2022-02-03 NOTE — PROGRESS NOTES
Qamar Henriquez MD., [facility]    [address] [place], [address] [facility]    Office [phone],FSN [phone]           Rod Linn is here for a f/u office visit. Primary care physician:  Kelly Murrieta MD    CC - as documented in EMR    Dear Dr. Kelly Murrieta MD    I had the pleasure of seeing Ms. Rod Linn in the office today. Assessment:     History of arrhythmia - 8 second pause during procedure ( anesthesia/L Port placement) 5/12/21  Mobitz 2 type I; 1st deg AV block  Breast CA- S/p breast biopsy and port placement ; S/p Breat lumpectomy; now on chemotherapy (6 cycles of cyclophosphamide, methotrexate, 5-fluorouracil); last treatment 9/28/2021  Event monitor-5/12/2021-6/10/2021-PAC burden 3%, PVC burden 10%. 9 times NSVT ( longest 9 beat run)      Plan:    Nml EF Oct/21  April nuclear study-6/2021-normal  Not on AV cande blocking agents. TSH 5.62 ( mildly elevated) 5/12/21 -recheck 6/40/21-within normal limits  Aggressive cardiovascular risk factor modification. Avoid QT prolonging drugs. Follow-up in 6 months/earlier as needed      Patient understands the plan. All questions were answered to the patient's satisfaction. I appreciate the opportunity to be involved in Ms. Junior. See note below for details. Please do not hesitate to contact us with questions or concerns. Qamar Henriquez MD      Cardiac Testing/ Procedures: A. Cardiac Cath/PCI:    B.ECHO/JOSELINE: 10/7/2021-Limited echo-normal EF-55%/mild MR/mild TR  5/12/21 - Nml EF/Mild MR    C. StressNuclear/Stress ECHO/Stress test: 6/28/2021-normal April nuclear study. EF 65%    D. Vascular:    E. EP: Event monitor 5/12/2021-6/10/2021-patient monitored for 28 days 10/60 minutes  50 events were transmitted-1 symptomatic, 49 device triggered.   SVT occurred 1 time with fastest of 107 bpm.  NSVT occurred 9 times with fastest run of 187 bpm.  Heart block occurred 2 times-first-degree AV block slowest heartbeat 60 bpm.  PAC burden-3%, PVC burden 10%. No A. fib. No pauses. F. Miscellaneous:    History:     Humberto Lala is a 64 y.o. female who returns for follow up visit. No CP/dyspnea/swelling LE/palpitaitons  Occ dizziness        ROS:  (bold if positive, if negative)           Medications:       Current Outpatient Medications   Medication Sig Dispense    escitalopram oxalate (LEXAPRO) 20 mg tablet Take 1 tablet by mouth once daily 30 Tablet    lidocaine-prilocaine (EMLA) topical cream Apply  to affected area as needed for Pain. 30 g    alendronate (FOSAMAX) 35 mg tablet Take 70 mg by mouth every seven (7) days. mondays      No current facility-administered medications for this visit. Family History of CAD:    No    Social History:  Current  Smoker  No    Physical Exam:     Visit Vitals  /72 (BP 1 Location: Left upper arm, BP Patient Position: Sitting)   Pulse 64   Ht 5' 5.5\" (1.664 m)   Wt 142 lb (64.4 kg)   LMP 11/15/1990 (Exact Date)   SpO2 94%   BMI 23.27 kg/m²          Gen: Well-developed, well-nourished, in no acute distress  Neck: Supple,No JVD, No Carotid Bruit,   Resp: No accessory muscle use, Clear breath sounds, No rales or rhonchi  Card: Regular Rate,Rythm,Normal S1, S2, No murmurs, rubs or gallop. No thrills. Abd:  Soft,BS+,   MSK: No cyanosis  Skin: No rashes    Neuro: moving all four extremities , follows commands appropriately  Psych:  Good insight, oriented to person, place , alert, Nml Affect  LE: No edema    EKG:       Medication Side Effects and Warnings were discussed with patient: yes  Patient Labs were reviewed and/or requested:  yes  Patient Past Records were reviewed and/or requested: yes    Total time :        mins    ATTENTION:   This medical record was transcribed using an electronic medical records/speech recognition system. Although proofread, it may and can contain electronic, spelling and other errors.   Corrections may be executed at a later time. Please feel free to contact us for any clarifications as needed.       Ingris Jacques MD

## 2022-02-03 NOTE — LETTER
2/3/2022    Patient: Caleb Swift   YOB: 1945   Date of Visit: 2/3/2022     Ary Elizabeth MD  [address] [facility] [phone]  Via Fax: [phone]    Dear Ary Elizabeth MD,      Thank you for referring Ms. Sánchez Montaño to [facility] for evaluation. My notes for this consultation are attached. If you have questions, please do not hesitate to call me. I look forward to following your patient along with you.       Sincerely,    Jhonny Burkitt, MD

## 2022-03-03 RX ORDER — ESCITALOPRAM OXALATE 20 MG/1
TABLET ORAL
Qty: 30 TABLET | Refills: 0 | Status: SHIPPED | OUTPATIENT
Start: 2022-03-03 | End: 2022-04-06

## 2022-03-08 ENCOUNTER — TELEPHONE (OUTPATIENT)
Dept: SURGERY | Age: 77
End: 2022-03-08

## 2022-03-08 NOTE — TELEPHONE ENCOUNTER
Message sent from [facility] to call patient's  Christie Queen about when patient should have mammogram and follow up with Dr. Magdaleno Spencer. I reviewed her chart. I called him back and there was no answer. I left a message asking him if she had XRT. Advised that mammogram should be 6 months after completed of XRT. Advised him to call us back so we can help him answer his questions.

## 2022-03-08 NOTE — TELEPHONE ENCOUNTER
Spoke with patient's  Jorge Cabrera and gave him the phone # to call scheduling to schedule patient's mammogram.  It is already ordered. Advised him to call the  to schedule patient's follow up with Dr. Tomas Matute. I explained her could schedule them for the same day but that was up to them. Patient did not have XRT. He then explained he was frustrated that no one called him back about a month ago when he called. I apologized and asked if he wanted to speak to our manager. He said no. After talking to him for a minute, he seem appreciative.

## 2022-03-19 PROBLEM — C50.911 BREAST CANCER, RIGHT (HCC): Status: ACTIVE | Noted: 2021-04-21

## 2022-04-19 ENCOUNTER — OFFICE VISIT (OUTPATIENT)
Dept: SURGERY | Age: 77
End: 2022-04-19
Payer: MEDICARE

## 2022-04-19 ENCOUNTER — HOSPITAL ENCOUNTER (OUTPATIENT)
Dept: MAMMOGRAPHY | Age: 77
Discharge: HOME | End: 2022-04-19
Attending: SURGERY
Payer: MEDICARE

## 2022-04-19 VITALS
WEIGHT: 142 LBS | BODY MASS INDEX: 22.82 KG/M2 | DIASTOLIC BLOOD PRESSURE: 51 MMHG | HEART RATE: 64 BPM | SYSTOLIC BLOOD PRESSURE: 117 MMHG | HEIGHT: 66 IN

## 2022-04-19 DIAGNOSIS — Z85.3 HX: BREAST CANCER: ICD-10-CM

## 2022-04-19 DIAGNOSIS — Z85.3 HX OF BREAST CANCER: Primary | ICD-10-CM

## 2022-04-19 PROCEDURE — 77062 BREAST TOMOSYNTHESIS BI: CPT

## 2022-04-19 PROCEDURE — 99213 OFFICE O/P EST LOW 20 MIN: CPT | Performed by: SURGERY

## 2022-04-19 PROCEDURE — G8536 NO DOC ELDER MAL SCRN: HCPCS | Performed by: SURGERY

## 2022-04-19 PROCEDURE — G8420 CALC BMI NORM PARAMETERS: HCPCS | Performed by: SURGERY

## 2022-04-19 PROCEDURE — G8399 PT W/DXA RESULTS DOCUMENT: HCPCS | Performed by: SURGERY

## 2022-04-19 PROCEDURE — G8432 DEP SCR NOT DOC, RNG: HCPCS | Performed by: SURGERY

## 2022-04-19 PROCEDURE — 1101F PT FALLS ASSESS-DOCD LE1/YR: CPT | Performed by: SURGERY

## 2022-04-19 PROCEDURE — G8427 DOCREV CUR MEDS BY ELIG CLIN: HCPCS | Performed by: SURGERY

## 2022-04-19 PROCEDURE — 1090F PRES/ABSN URINE INCON ASSESS: CPT | Performed by: SURGERY

## 2022-04-19 RX ORDER — DICLOFENAC SODIUM 75 MG/1
TABLET, DELAYED RELEASE ORAL
COMMUNITY
Start: 2022-04-06

## 2022-04-19 NOTE — PROGRESS NOTES
HISTORY OF PRESENT ILLNESS  Josiane Finnegan is a 64 y.o. female. HPI  ESTABLISHED patient here for follow up RIGHT breast cancer. Patient states today she has a little soreness underneath right arm. Not quite numbness but a little discomfort. Overall doing well.    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 COMPARISON: Most recent 2021  . COMPOSITION: The breast tissue is scattered fibroglandular densities. .  TECHNIQUE: Standard 2D, CC and MLO views of each breast were performed with  digital technique and subjected to computer-aided detection. Tomosynthesis of  each breast was performed in CC and MLO projections. Indication views of the  lumpectomy site were performed. [facility] FINDINGS:    New lumpectomy changes on the right. No suspicious masses or  microcalcifications. .    Impression  1. BI-RADS category 2, benign. 2. The patient should return in one year for routine bilateral mammography. 3. She was informed. ROS    Physical Exam  Chest:             ASSESSMENT and PLAN    ICD-10-CM ICD-9-CM    1. Hx of breast cancer  Z85.3 V10.3       Total time spent with patient: 20 minutes   RIGHT breast cancer. Triple negative  mammogram today BIRADS 2  Continue annual mammograms  PRN follow up if mammogram is abnormal or she feels a new mass.

## 2022-04-19 NOTE — LETTER
4/19/2022    Patient: Jd Norman   YOB: 1945   Date of Visit: 4/19/2022     Jude Ha MD  [address] [facility] [address] [phone]  Via Fax: [phone]    Dear Jdue Ha MD,      Thank you for referring Ms. Carolyn Owens to [facility] for evaluation. My notes for this consultation are attached. If you have questions, please do not hesitate to call me. I look forward to following your patient along with you.       Sincerely,    Sierar Gleason MD

## 2022-05-17 ENCOUNTER — TELEPHONE (OUTPATIENT)
Dept: ONCOLOGY | Age: 77
End: 2022-05-17

## 2022-05-17 NOTE — TELEPHONE ENCOUNTER
Lm with patient to let her know her appointment has been moved to 6/28/22 at 11am per the team..due to extremely long wait times on 5/31. Gave her office number to call back with any questions.

## 2022-06-28 ENCOUNTER — OFFICE VISIT (OUTPATIENT)
Dept: ONCOLOGY | Age: 77
End: 2022-06-28
Payer: MEDICARE

## 2022-06-28 VITALS
HEART RATE: 54 BPM | TEMPERATURE: 98 F | DIASTOLIC BLOOD PRESSURE: 88 MMHG | RESPIRATION RATE: 18 BRPM | SYSTOLIC BLOOD PRESSURE: 155 MMHG | OXYGEN SATURATION: 98 % | BODY MASS INDEX: 23.63 KG/M2 | HEIGHT: 65 IN

## 2022-06-28 DIAGNOSIS — Z17.1 MALIGNANT NEOPLASM OF UPPER-OUTER QUADRANT OF RIGHT BREAST IN FEMALE, ESTROGEN RECEPTOR NEGATIVE (HCC): Primary | ICD-10-CM

## 2022-06-28 DIAGNOSIS — C50.411 MALIGNANT NEOPLASM OF UPPER-OUTER QUADRANT OF RIGHT BREAST IN FEMALE, ESTROGEN RECEPTOR NEGATIVE (HCC): Primary | ICD-10-CM

## 2022-06-28 PROCEDURE — G0463 HOSPITAL OUTPT CLINIC VISIT: HCPCS | Performed by: INTERNAL MEDICINE

## 2022-06-28 PROCEDURE — G8536 NO DOC ELDER MAL SCRN: HCPCS | Performed by: INTERNAL MEDICINE

## 2022-06-28 PROCEDURE — 1101F PT FALLS ASSESS-DOCD LE1/YR: CPT | Performed by: INTERNAL MEDICINE

## 2022-06-28 PROCEDURE — 1123F ACP DISCUSS/DSCN MKR DOCD: CPT | Performed by: INTERNAL MEDICINE

## 2022-06-28 PROCEDURE — G8420 CALC BMI NORM PARAMETERS: HCPCS | Performed by: INTERNAL MEDICINE

## 2022-06-28 PROCEDURE — 1090F PRES/ABSN URINE INCON ASSESS: CPT | Performed by: INTERNAL MEDICINE

## 2022-06-28 PROCEDURE — G8432 DEP SCR NOT DOC, RNG: HCPCS | Performed by: INTERNAL MEDICINE

## 2022-06-28 PROCEDURE — G8399 PT W/DXA RESULTS DOCUMENT: HCPCS | Performed by: INTERNAL MEDICINE

## 2022-06-28 PROCEDURE — G8427 DOCREV CUR MEDS BY ELIG CLIN: HCPCS | Performed by: INTERNAL MEDICINE

## 2022-06-28 PROCEDURE — 99212 OFFICE O/P EST SF 10 MIN: CPT | Performed by: INTERNAL MEDICINE

## 2022-06-28 NOTE — PROGRESS NOTES
Lissett Munoz is a 76 y.o. female Follow up for the evaluation of breast cancer. 1. Have you been to the ER, urgent care clinic since your last visit? Hospitalized since your last visit? No    2. Have you seen or consulted any other health care providers outside of the [address] [place] [address] since your last visit? Include any pap smears or colon screening.  No

## 2022-06-28 NOTE — PROGRESS NOTES
[facility] [place] at [address], [address] [facility]  Félix Sweeney: [phone]  F: [phone]      Reason for Visit:   Meli Fonseca is a 76 y.o. female who is seen in follow up for evaluation of therapy for breast cancer. Breast surgery: Dr. Moise Pendleton    Treatment History:   · 3/12/21 GEJ bx:  Squamocolumnar mucosa with reactive changes  · 4/16/21 right breast 1:00 core bx:  IDC, gr 3, 1.3 cm, gr 3, ER negative, VT negative, HER 2 negative at [facility] 1+  · 5/12/21 right breast lumpectomy: IDC with medullary features, 1.9 cm, gr 3, 0/1 LN, ER negative, [place] negative, HER 2 negative at IHC 0, pT1c pN0 cM0  · CMF 6/15/21-9/28/21    History of Present Illness: An abnormal mammogram led to the pathology above    Interval history:  Presents today for follow up and treatment. She reports gr 2 diarrhea, gr 1 loss of cognition/concentration, gr 2 anxiety/depression, gr 1 cough, gr 1 dyspnea, gr 1 itching, gr 1 neuropathy, gr 1 urinary leakage.      FH:  Maternal aunt with possible breast cancer at an unknown age; sister with ovarian cancer in her 62s; no prostate or pancreas cancer    Past Medical History:   Diagnosis Date    Allergic rhinitis due to other allergen     Arthritis     Breast cancer ([address]     Cancer ([address]     right breast    Hypercholesterolemia     Psychiatric disorder     anxiety    Seizures ([address]     \"with stress\" last was 2017      Past Surgical History:   Procedure Laterality Date    HX BREAST LUMPECTOMY Right 5/12/2021    RIGHT BREAST LUMPECTOMY, ABNER CATH INSERTION, RIGHT BREAST SENTINEL NODE BIOPSY performed by Sander Payne MD at [address] HX COLONOSCOPY  2021    HX SALPINGO-OOPHORECTOMY Left     at about age 26      Social History     Tobacco Use    Smoking status: Never Smoker    Smokeless tobacco: Never Used   Substance Use Topics    Alcohol use: No      Family History   Problem Relation Age of Onset    Diabetes Mother  Cancer Sister     Cancer Brother     Breast Cancer Maternal Aunt      Current Outpatient Medications   Medication Sig    diclofenac EC (VOLTAREN) 75 mg EC tablet TAKE 1 TABLET BY MOUTH TWICE DAILY WITH MEALS AS NEEDED FOR PAIN    escitalopram oxalate (LEXAPRO) 20 mg tablet Take 1 tablet by mouth once daily    alendronate (FOSAMAX) 35 mg tablet Take 70 mg by mouth every seven (7) days. mondays    lidocaine-prilocaine (EMLA) topical cream Apply  to affected area as needed for Pain. (Patient not taking: Reported on 6/28/2022)     No current facility-administered medications for this visit. Allergies   Allergen Reactions    Codeine Other (comments)     Other reaction(s): Other (see comments)  Hallucinations        Review of Systems: A complete review of systems was obtained, negative except as described above. Physical Exam:     Visit Vitals  BP (!) 155/88 Comment: Pt. moving and talking--Tried X2.    Pulse (!) 54   Temp 98 °F (36.7 °C) (Temporal)   Resp 18   Ht 5' 5\" (1.651 m)   LMP 11/15/1990 (Exact Date)   SpO2 98%   BMI 23.63 kg/m²     ECOG PS: 0    Constitutional: Appears well-developed and well-nourished in no apparent distress      Mental status: Alert and awake, Oriented to person/place/time, Able to follow commands    Eyes: EOM normal, Sclera normal, No visible ocular discharge    HENT: Normocephalic, atraumatic    Mouth/Throat: Moist mucous membranes    External Ears normal    Neck: No visualized mass    Pulmonary/Chest: Respiratory effort normal, No visualized signs of difficulty breathing or respiratory distress    Musculoskeletal: Normal gait with no signs of ataxia, Normal range of motion of neck    Neurological: No facial asymmetry (Cranial nerve 7 motor function), No gaze palsy    Skin: No significant exanthematous lesions or discoloration noted on facial skin    Psychiatric: Normal affect, No hallucinations       Results:     Lab Results   Component Value Date/Time    WBC 2.4 (L) 09/28/2021 07:47 AM    HGB 10.5 (L) 09/28/2021 07:47 AM    HCT 32.6 (L) 09/28/2021 07:47 AM    PLATELET 917 62/34/0903 07:47 AM    MCV 97.6 09/28/2021 07:47 AM    ABS. NEUTROPHILS 1.1 (L) 09/28/2021 07:47 AM     Lab Results   Component Value Date/Time    Sodium 141 09/28/2021 07:47 AM    Potassium 3.6 09/28/2021 07:47 AM    Chloride 108 09/28/2021 07:47 AM    CO2 28 09/28/2021 07:47 AM    Glucose 103 (H) 09/28/2021 07:47 AM    BUN 14 09/28/2021 07:47 AM    Creatinine 0.78 09/28/2021 07:47 AM    GFR est AA >60 09/28/2021 07:47 AM    GFR est non-AA >60 09/28/2021 07:47 AM    Calcium 8.7 09/28/2021 07:47 AM     Lab Results   Component Value Date/Time    Bilirubin, total 0.5 09/28/2021 07:47 AM    ALT (SGPT) 19 09/28/2021 07:47 AM    Alk. phosphatase 78 09/28/2021 07:47 AM    Protein, total 6.3 (L) 09/28/2021 07:47 AM    Albumin 3.3 (L) 09/28/2021 07:47 AM    Globulin 3.0 09/28/2021 07:47 AM     3/30/21 dexa     This patient is osteoporotic using the World Health Organization criteria  As compared to the prior study, there has been a significant 5.6% increase in  the left total hip and a significant 9% increase in the right total hip    4/12/21 right breast US  IMPRESSION should read \"BI-RADS 4. Suspicious abnormality. Recommend histologic  correlation. 1.8 cm x 1.6 cm x 1.2 cm 10:00 right breast lesion. Records reviewed and summarized above. Pathology report(s) reviewed above. Radiology report(s) reviewed above. Assessment/plan:   1. Right breast IDC, gr 3, triple negative, cT1c cN0 cM0, stage IA, prognostic stage IB    · 5/12/21 right breast lumpectomy: IDC with medullary features, 1.9 cm, gr 3, 0/1 LN, ER negative, IA negative, HER 2 negative at IHC 0, pT1c pN0 cM0    We explained to the patient that the goal of systemic adjuvant therapy is to improve the chances for cure and decrease the risk of relapse.  We explained why a patient can have microscopic cancer spread now even though physical examination, laboratory studies and imaging studies are negative for cancer. We explained that the same treatments used now as adjuvant or preventive treatments rarely if ever are curative in women who develop metastases. Using eprognosis. org, her 5 year all cause mortality risk is 9-15%; her 10 year all cause mortality risk is 34-43%; her median OS is 12-14 years. An adjuvant discussion is warranted. s/p IV CMF q 3 weeks x 6.      She presents today for follow up. She is doing well with MIRIAN. Last follow up with Dr. Lucas Castaneda 4/2022 with mammogram 4/19/22 negative. Referred to [facility] but she did not go to consult. She decided not to have XRT. Port removed 12/20/22    Follow-up after early breast cancer was discussed. I recommend follow-up as defined by the American Society of Clinical Oncology and [facility]. This includes a visit to a health care professional every 3-6 months for 3 years, then every 6-12 months for 2 years, and then yearly as well as mammograms yearly. 2. Emotional well being:  She has excellent support and is coping well with her disease    3. Osteoporosis:  On fosamax and taking mutlitvitamin with vitamin d3 included. 4. Osteoarthritis:Taking Diclofenac. 5. Mood Swings: she is on lexapro 20 mg daily. Improved but still struggling emotionally, will have LCSW reach out to hear and discuss counselor. 6. Loss of concentration: started prior to chemotherapy and she feels this worsened after chemotherapy. I saw and evaluated the patient on 6/28/22 and discussed care with collaborating provider, Dr. Angelito Pereira. This signature serves as Radha Guerrier's attestation. I personally saw and evaluated the patient and performed the entire medical decision making. The history, physical exam, and documentation were performed by Kary Velez NP. I reviewed and verified the above documentation and modified it as needed. Right [place] breast cancer, MIRIAN.   On fosamax for osteoporosis. Will have SW reach out to her for mood swings. RTC 8 months. Mammogram reviewed from April        Signed By: Sulema Kendall MD      No orders of the defined types were placed in this encounter.

## 2022-07-20 ENCOUNTER — DOCUMENTATION ONLY (OUTPATIENT)
Dept: CARDIOLOGY CLINIC | Age: 77
End: 2022-07-20

## 2022-07-20 NOTE — PROGRESS NOTES
Lipid panel per PCP Dr Sunday Dias 2/11/2022:    Cholesterol           263  Triglyceride          108  HDL                       66  HDL % Chol           25  Chol/HDL risk ratio 4.0  LDLC/HDL               2.66  NON- HDL Chol        197  LDL- Calculated         175  VLDL                         22

## 2022-08-04 ENCOUNTER — OFFICE VISIT (OUTPATIENT)
Dept: CARDIOLOGY CLINIC | Age: 77
End: 2022-08-04
Payer: MEDICARE

## 2022-08-04 VITALS
SYSTOLIC BLOOD PRESSURE: 132 MMHG | HEART RATE: 66 BPM | OXYGEN SATURATION: 97 % | BODY MASS INDEX: 22.56 KG/M2 | HEIGHT: 65 IN | WEIGHT: 135.4 LBS | DIASTOLIC BLOOD PRESSURE: 78 MMHG

## 2022-08-04 DIAGNOSIS — I47.29 NSVT (NONSUSTAINED VENTRICULAR TACHYCARDIA): ICD-10-CM

## 2022-08-04 DIAGNOSIS — R42 DIZZINESS: ICD-10-CM

## 2022-08-04 DIAGNOSIS — Z86.79 HISTORY OF CARDIAC ARRHYTHMIA: Primary | ICD-10-CM

## 2022-08-04 PROCEDURE — 99214 OFFICE O/P EST MOD 30 MIN: CPT | Performed by: INTERNAL MEDICINE

## 2022-08-04 PROCEDURE — G8420 CALC BMI NORM PARAMETERS: HCPCS | Performed by: INTERNAL MEDICINE

## 2022-08-04 PROCEDURE — G8427 DOCREV CUR MEDS BY ELIG CLIN: HCPCS | Performed by: INTERNAL MEDICINE

## 2022-08-04 PROCEDURE — G8432 DEP SCR NOT DOC, RNG: HCPCS | Performed by: INTERNAL MEDICINE

## 2022-08-04 PROCEDURE — G8536 NO DOC ELDER MAL SCRN: HCPCS | Performed by: INTERNAL MEDICINE

## 2022-08-04 PROCEDURE — G8399 PT W/DXA RESULTS DOCUMENT: HCPCS | Performed by: INTERNAL MEDICINE

## 2022-08-04 PROCEDURE — 1090F PRES/ABSN URINE INCON ASSESS: CPT | Performed by: INTERNAL MEDICINE

## 2022-08-04 PROCEDURE — G0463 HOSPITAL OUTPT CLINIC VISIT: HCPCS | Performed by: INTERNAL MEDICINE

## 2022-08-04 PROCEDURE — 1123F ACP DISCUSS/DSCN MKR DOCD: CPT | Performed by: INTERNAL MEDICINE

## 2022-08-04 PROCEDURE — 93010 ELECTROCARDIOGRAM REPORT: CPT | Performed by: INTERNAL MEDICINE

## 2022-08-04 PROCEDURE — 93005 ELECTROCARDIOGRAM TRACING: CPT | Performed by: INTERNAL MEDICINE

## 2022-08-04 PROCEDURE — 1101F PT FALLS ASSESS-DOCD LE1/YR: CPT | Performed by: INTERNAL MEDICINE

## 2022-08-04 NOTE — PROGRESS NOTES
Shayla Keller MD., [facility]    [address] [place], [address] [facility]    Office [phone],[facility] [phone]           Tonya Mcneill is here for a f/u office visit. Primary care physician:  Cindy Mckeon MD    CC - as documented in EMR    Dear Dr. Cindy Mckeon MD    I had the pleasure of seeing Ms. Tonya Mcneill in the office today. Assessment:     History of arrhythmia - 8 second pause during procedure ( anesthesia/L Port placement) 5/12/21  Mobitz 2 type I; 1st deg AV block  Breast CA- S/p breast biopsy and port placement ; S/p Breat lumpectomy; now on chemotherapy (6 cycles of cyclophosphamide, methotrexate, 5-fluorouracil); last treatment 9/28/2021  Event monitor-5/12/2021-6/10/2021-PAC burden 3%, PVC burden 10%. 9 times NSVT ( longest 9 beat run)  Incomplete LBBB on EKG  8/4/22( change noted from prior EKG -increase in QRS duration)    Plan:    Nml EF Oct/21  April nuclear study-6/2021-normal  Not on AV cande blocking agents. 2/12/2022-, HDL 66, , triglycerides 108-will benefit from statins. Advised to discuss with PCP. Patient is hesitant to take any medications. TSH 5.62 ( mildly elevated) 5/12/21 -recheck 6/40/21-within normal limits  Aggressive cardiovascular risk factor modification. Avoid QT prolonging drugs. Follow-up in 6 months/earlier as needed      Patient understands the plan. All questions were answered to the patient's satisfaction. I appreciate the opportunity to be involved in Ms. Junior. See note below for details. Please do not hesitate to contact us with questions or concerns. Shayla Keller MD      Cardiac Testing/ Procedures: A. Cardiac Cath/PCI:    B.ECHO/JOSELINE: 10/7/2021-Limited echo-normal EF-55%/mild MR/mild TR  5/12/21 - Nml EF/Mild MR    C. StressNuclear/Stress ECHO/Stress test: 6/28/2021-normal April nuclear study. EF 65%    D. Vascular:    E. EP: Event monitor 5/12/2021-6/10/2021-patient monitored for 28 days 10/60 minutes  50 events were transmitted-1 symptomatic, 49 device triggered. SVT occurred 1 time with fastest of 107 bpm.  NSVT occurred 9 times with fastest run of 187 bpm.  Heart block occurred 2 times-first-degree AV block slowest heartbeat 60 bpm.  PAC burden-3%, PVC burden 10%. No A. fib. No pauses. F. Miscellaneous:    History:     Georgette Martinez is a 76 y.o. female who returns for follow up visit. No CP/dyspnea/swelling LE/palpitaitons  Occ dizziness - edin when she turns suddenly. Had mechanical fall recently. Per , patient isunsteady on her feet and uses a cane. She is reluctant to use a walker. ROS:  (bold if positive, if negative)           Medications:       Current Outpatient Medications   Medication Sig Dispense    diclofenac EC (VOLTAREN) 75 mg EC tablet TAKE 1 TABLET BY MOUTH TWICE DAILY WITH MEALS AS NEEDED FOR PAIN     escitalopram oxalate (LEXAPRO) 20 mg tablet Take 1 tablet by mouth once daily 30 Tablet    alendronate (FOSAMAX) 35 mg tablet Take 70 mg by mouth every seven (7) days. mondays      No current facility-administered medications for this visit. Family History of CAD:    No    Social History:  Current  Smoker  No3    Physical Exam:     Visit Vitals  /78 (BP 1 Location: Left upper arm, BP Patient Position: Sitting)   Pulse 66   Ht 5' 5\" (1.651 m)   Wt 135 lb 6.4 oz (61.4 kg)   LMP 11/15/1990 (Exact Date)   SpO2 97%   BMI 22.53 kg/m²          Gen: Well-developed, well-nourished, in no acute distress  Neck: Supple,No JVD, No Carotid Bruit,   Resp: No accessory muscle use, Clear breath sounds, No rales or rhonchi  Card: Regular Rate,Rythm,Normal S1, S2, No murmurs, rubs or gallop. No thrills.    Abd:  Soft,BS+,   MSK: No cyanosis  Skin: No rashes    Neuro: moving all four extremities , follows commands appropriately  Psych:  Good insight, oriented to person, place , alert, Nml Affect  LE: No edema    EKG: Sinus rhythm, first-degree AV block, left bundle branch block,      Medication Side Effects and Warnings were discussed with patient: yes  Patient Labs were reviewed and/or requested:  yes  Patient Past Records were reviewed and/or requested: yes    Total time :        mins    ATTENTION:   This medical record was transcribed using an electronic medical records/speech recognition system. Although proofread, it may and can contain electronic, spelling and other errors. Corrections may be executed at a later time. Please feel free to contact us for any clarifications as needed.       Azul Rogers MD

## 2022-08-04 NOTE — PROGRESS NOTES
Adriane Agent is a 76 y.o. female    Chief Complaint   Patient presents with    Follow-up     6 month follow up, hx cardiac arrhythmia      Some fatigue     Chest pain some slight muscle spasm at time     SOB patient states SOB     Dizziness some dizziness at times    Swelling No    Refills No    Visit Vitals  /78 (BP 1 Location: Left upper arm, BP Patient Position: Sitting)   Pulse 66   Ht 5' 5\" (1.651 m)   Wt 135 lb 6.4 oz (61.4 kg)   LMP 11/15/1990 (Exact Date)   SpO2 97%   BMI 22.53 kg/m²       1. Have you been to the ER, urgent care clinic since your last visit? Hospitalized since your last visit? No    2. Have you seen or consulted any other health care providers outside of the [address] [place] [address] since your last visit? Include any pap smears or colon screening.  No

## 2022-08-04 NOTE — LETTER
8/4/2022    Patient: Christiane Betts   YOB: 1945   Date of Visit: 8/4/2022     Rickey Mitchell MD  [id number] [facility] [address] [phone]  Via Fax: [phone]    Dear Rickey Mitchell MD,      Thank you for referring Ms. Grzegorz Jaime to [facility] for evaluation. My notes for this consultation are attached. If you have questions, please do not hesitate to call me. I look forward to following your patient along with you.       Sincerely,    Maryan Villanueva MD

## 2023-02-28 ENCOUNTER — OFFICE VISIT (OUTPATIENT)
Dept: ONCOLOGY | Age: 78
End: 2023-02-28
Payer: MEDICARE

## 2023-02-28 VITALS
HEART RATE: 67 BPM | OXYGEN SATURATION: 96 % | DIASTOLIC BLOOD PRESSURE: 60 MMHG | BODY MASS INDEX: 22.63 KG/M2 | TEMPERATURE: 97.3 F | WEIGHT: 136 LBS | RESPIRATION RATE: 16 BRPM | SYSTOLIC BLOOD PRESSURE: 117 MMHG

## 2023-02-28 DIAGNOSIS — Z17.1 MALIGNANT NEOPLASM OF UPPER-OUTER QUADRANT OF RIGHT BREAST IN FEMALE, ESTROGEN RECEPTOR NEGATIVE (HCC): Primary | ICD-10-CM

## 2023-02-28 DIAGNOSIS — C50.411 MALIGNANT NEOPLASM OF UPPER-OUTER QUADRANT OF RIGHT BREAST IN FEMALE, ESTROGEN RECEPTOR NEGATIVE (HCC): Primary | ICD-10-CM

## 2023-02-28 PROCEDURE — G0463 HOSPITAL OUTPT CLINIC VISIT: HCPCS | Performed by: INTERNAL MEDICINE

## 2023-02-28 RX ORDER — LEVOTHYROXINE SODIUM 50 UG/1
TABLET ORAL
COMMUNITY

## 2023-02-28 RX ORDER — DICLOFENAC SODIUM 30 MG/G
GEL TOPICAL 2 TIMES DAILY
COMMUNITY

## 2023-02-28 RX ORDER — DONEPEZIL HYDROCHLORIDE 5 MG/1
TABLET, FILM COATED ORAL
COMMUNITY

## 2023-02-28 NOTE — PROGRESS NOTES
[facility] [place] at [address], [address] [facility] 100[facility]  [place]: [phone]  F: [phone]      Reason for Visit:   Buddy Hatfield is a 76 y.o. female who is seen in follow up for evaluation of therapy for breast cancer. Breast surgery: Dr. Iesha Ireland    Treatment History:   3/12/21 GEJ bx:  Squamocolumnar mucosa with reactive changes  4/16/21 right breast 1:00 core bx:  IDC, gr 3, 1.3 cm, gr 3, ER negative, MI negative, HER 2 negative at [facility] 1+  5/12/21 right breast lumpectomy: IDC with medullary features, 1.9 cm, gr 3, 0/1 LN, ER negative, MI negative, HER 2 negative at [facility] 0, pT1c pN0 cM0  CMF 6/15/21-9/28/21    History of Present Illness: An abnormal mammogram led to the pathology above    Interval history:  Presents today for follow up and treatment. She reports gr 1 fatigue, gr 2 loss of cognition/concentration, gr 1 pain/cramping rated 2/10 to bilateral hips, gr 2 itching, gr 2 itching, gr 1 headache, gr 1 urinary frequency, gr 1 urinary leakage.      FH:  Maternal aunt with possible breast cancer at an unknown age; sister with ovarian cancer in her 62s; no prostate or pancreas cancer    Past Medical History:   Diagnosis Date    Allergic rhinitis due to other allergen     Arthritis     Breast cancer ([address]     Cancer ([facility]     right breast    Hypercholesterolemia     Psychiatric disorder     anxiety    Seizures ([facility]     \"with stress\" last was 2017      Past Surgical History:   Procedure Laterality Date    HX BREAST LUMPECTOMY Right 5/12/2021    RIGHT BREAST LUMPECTOMY, ABNER CATH INSERTION, RIGHT BREAST SENTINEL NODE BIOPSY performed by Yariel Sevilla MD at [address] [facility]    HX COLONOSCOPY  2021    HX SALPINGO-OOPHORECTOMY Left     at about age 26      Social History     Tobacco Use    Smoking status: Never    Smokeless tobacco: Never   Substance Use Topics    Alcohol use: No      Family History   Problem Relation Age of Onset    Diabetes Mother Cancer Sister     Cancer Brother     Breast Cancer Maternal Aunt      Current Outpatient Medications   Medication Sig    donepeziL (ARICEPT) 5 mg tablet donepezil 5 mg tablet   TAKE 1 TABLET BY MOUTH ONCE DAILY    levothyroxine (SYNTHROID) 50 mcg tablet levothyroxine 50 mcg tablet    diclofenac (SOLARAZE) 3 % topical gel Apply  to affected area two (2) times a day. escitalopram oxalate (LEXAPRO) 20 mg tablet Take 1 tablet by mouth once daily    alendronate (FOSAMAX) 35 mg tablet Take 70 mg by mouth every seven (7) days. mondays    diclofenac EC (VOLTAREN) 75 mg EC tablet TAKE 1 TABLET BY MOUTH TWICE DAILY WITH MEALS AS NEEDED FOR PAIN (Patient not taking: Reported on 2/28/2023)     No current facility-administered medications for this visit. Allergies   Allergen Reactions    Codeine Other (comments)     Other reaction(s): Other (see comments)  Hallucinations        Review of Systems: A complete review of systems was obtained, negative except as described above.     Physical Exam:     Visit Vitals  /60 (BP 1 Location: Left upper arm, BP Patient Position: Sitting)   Pulse 67   Temp 97.3 °F (36.3 °C) (Temporal)   Resp 16   Wt 136 lb (61.7 kg)   LMP 11/15/1990 (Exact Date)   SpO2 96%   BMI 22.63 kg/m²     ECOG PS: 0    Constitutional: Appears well-developed and well-nourished in no apparent distress      Mental status: Alert and awake, Oriented to person/place/time, Able to follow commands    Eyes: EOM normal, Sclera normal, No visible ocular discharge    HENT: Normocephalic, atraumatic    Mouth/Throat: Moist mucous membranes    External Ears normal    Neck: No visualized mass    Pulmonary/Chest: Respiratory effort normal, No visualized signs of difficulty breathing or respiratory distress    Musculoskeletal: Normal gait with no signs of ataxia, Normal range of motion of neck    Neurological: No facial asymmetry (Cranial nerve 7 motor function), No gaze palsy    Skin: No significant exanthematous lesions or discoloration noted on facial skin    Psychiatric: Normal affect, No hallucinations       Results:     Lab Results   Component Value Date/Time    WBC 2.4 (L) 09/28/2021 07:47 AM    HGB 10.5 (L) 09/28/2021 07:47 AM    HCT 32.6 (L) 09/28/2021 07:47 AM    PLATELET 299 76/18/9223 07:47 AM    MCV 97.6 09/28/2021 07:47 AM    ABS. NEUTROPHILS 1.1 (L) 09/28/2021 07:47 AM     Lab Results   Component Value Date/Time    Sodium 141 09/28/2021 07:47 AM    Potassium 3.6 09/28/2021 07:47 AM    Chloride 108 09/28/2021 07:47 AM    CO2 28 09/28/2021 07:47 AM    Glucose 103 (H) 09/28/2021 07:47 AM    BUN 14 09/28/2021 07:47 AM    Creatinine 0.78 09/28/2021 07:47 AM    GFR est AA >60 09/28/2021 07:47 AM    GFR est non-AA >60 09/28/2021 07:47 AM    Calcium 8.7 09/28/2021 07:47 AM     Lab Results   Component Value Date/Time    Bilirubin, total 0.5 09/28/2021 07:47 AM    ALT (SGPT) 19 09/28/2021 07:47 AM    Alk. phosphatase 78 09/28/2021 07:47 AM    Protein, total 6.3 (L) 09/28/2021 07:47 AM    Albumin 3.3 (L) 09/28/2021 07:47 AM    Globulin 3.0 09/28/2021 07:47 AM     3/30/21 dexa     This patient is osteoporotic using the World Health Organization criteria  As compared to the prior study, there has been a significant 5.6% increase in  the left total hip and a significant 9% increase in the right total hip    4/12/21 right breast US  IMPRESSION should read \"BI-RADS 4. Suspicious abnormality. Recommend histologic  correlation. 1.8 cm x 1.6 cm x 1.2 cm 10:00 right breast lesion. Records reviewed and summarized above. Pathology report(s) reviewed above. Radiology report(s) reviewed above. Assessment/plan:   1.  Right breast IDC, gr 3, triple negative, cT1c cN0 cM0, stage IA, prognostic stage IB    5/12/21 right breast lumpectomy: IDC with medullary features, 1.9 cm, gr 3, 0/1 LN, ER negative, [place] negative, HER 2 negative at IHC 0, pT1c pN0 cM0    We explained to the patient that the goal of systemic adjuvant therapy is to improve the chances for cure and decrease the risk of relapse. We explained why a patient can have microscopic cancer spread now even though physical examination, laboratory studies and imaging studies are negative for cancer. We explained that the same treatments used now as adjuvant or preventive treatments rarely if ever are curative in women who develop metastases. Using eprognosis. org, her 5 year all cause mortality risk is 9-15%; her 10 year all cause mortality risk is 34-43%; her median OS is 12-14 years. An adjuvant discussion is warranted. s/p IV CMF q 3 weeks x 6. She presents today for follow up. She is doing well with MIRIAN. Last follow up with Dr. Williams Rodríguez 4/2022 with mammogram 4/19/22 negative. She states no longer following with Dr. Williams Rodríguez, mammogram ordered for 4/2023. Referred to [facility] but she did not go to consult. She decided not to have XRT. Follow-up after early breast cancer was discussed. I recommend follow-up as defined by the American Society of Clinical Oncology and [facility]. This includes a visit to a health care professional every 3-6 months for 3 years, then every 6-12 months for 2 years, and then yearly as well as mammograms yearly. 2. Emotional well being:  She has excellent support and is coping well with her disease    3. Osteoporosis:  On fosamax and taking mutlitvitamin with vitamin d3 included. 4. Osteoarthritis:Taking Diclofenac. 5. Mood Swings: she is on lexapro 20 mg daily and started Aricept with PCP. 6. Loss of concentration/cognition: started prior to chemotherapy and she feels this worsened after chemotherapy. Now taking Aricept per PCP. 7. Itching: generalized with no rash, discussed daily zyrtec and hypoallergenic lotion. I saw and evaluated the patient on 2/28/23 and discussed care with collaborating provider, Dr. Marcelino Valverde. This signature serves as Radha Guerrier's attestation.      I personally saw and evaluated the patient and performed the entire medical decision making. The history, physical exam, and documentation were performed by Nana Goodell, NP. I reviewed and verified the above documentation and modified it as needed. Right breast [place] cancer, MIRIAN. Mammogram ordered for April. On fosamax for osteoporosis. Rtc 1 year        Signed By: Wilver Colon MD      No orders of the defined types were placed in this encounter. Other

## 2023-02-28 NOTE — PROGRESS NOTES
Buddy Hatfield is a 76 y.o. female here today to follow up for breast cancer. 1. Have you been to the ER, urgent care clinic since your last visit? Hospitalized since your last visit? no    2. Have you seen or consulted any other health care providers outside of the [address] [place] [address] since your last visit? Include any pap smears or colon screening.   no

## 2023-04-13 ENCOUNTER — HOSPITAL ENCOUNTER (OUTPATIENT)
Dept: MAMMOGRAPHY | Age: 78
Discharge: HOME | End: 2023-04-13
Attending: NURSE PRACTITIONER
Payer: MEDICARE

## 2023-04-13 DIAGNOSIS — Z17.1 MALIGNANT NEOPLASM OF UPPER-OUTER QUADRANT OF RIGHT BREAST IN FEMALE, ESTROGEN RECEPTOR NEGATIVE (HCC): ICD-10-CM

## 2023-04-13 DIAGNOSIS — C50.411 MALIGNANT NEOPLASM OF UPPER-OUTER QUADRANT OF RIGHT BREAST IN FEMALE, ESTROGEN RECEPTOR NEGATIVE (HCC): ICD-10-CM

## 2023-04-13 PROCEDURE — 77062 BREAST TOMOSYNTHESIS BI: CPT

## 2023-04-13 PROCEDURE — G0279 TOMOSYNTHESIS, MAMMO: HCPCS

## 2023-04-22 DIAGNOSIS — Z17.1 MALIGNANT NEOPLASM OF UPPER-OUTER QUADRANT OF RIGHT BREAST IN FEMALE, ESTROGEN RECEPTOR NEGATIVE (HCC): Primary | ICD-10-CM

## 2023-04-22 DIAGNOSIS — C50.411 MALIGNANT NEOPLASM OF UPPER-OUTER QUADRANT OF RIGHT BREAST IN FEMALE, ESTROGEN RECEPTOR NEGATIVE (HCC): Primary | ICD-10-CM

## 2023-05-12 ENCOUNTER — TELEPHONE (OUTPATIENT)
Age: 78
End: 2023-05-12

## 2023-05-12 DIAGNOSIS — Z17.1 MALIGNANT NEOPLASM OF UPPER-OUTER QUADRANT OF RIGHT BREAST IN FEMALE, ESTROGEN RECEPTOR NEGATIVE (HCC): Primary | ICD-10-CM

## 2023-05-12 DIAGNOSIS — C50.411 MALIGNANT NEOPLASM OF UPPER-OUTER QUADRANT OF RIGHT BREAST IN FEMALE, ESTROGEN RECEPTOR NEGATIVE (HCC): Primary | ICD-10-CM

## 2023-05-12 RX ORDER — ESCITALOPRAM OXALATE 10 MG/1
20 TABLET ORAL DAILY
Qty: 60 TABLET | Refills: 0 | Status: SHIPPED | OUTPATIENT
Start: 2023-05-12

## 2023-05-12 NOTE — TELEPHONE ENCOUNTER
[facility] called and stated that they are unable to fill the patient lexapro 20mg medication because they do not have any 20mg tablets and cannot get any to fill the script. They wanted to know if it was okay if a new prescription was given for lexapro 10mg so they can give the patient their medication.          # [phone]

## 2023-05-22 NOTE — PROGRESS NOTES
Eloisa Ro MD., [facility]      [address] [place], [address] [facility]      Office [phone],N [phone]                 Flo Huynh is here for a f/u office visit. Primary care physician:   Kushal Samuel MD      CC - as documented in EMR      Dear Dr. Kushal Samuel MD      I had the pleasure of seeing Ms. Flo Huynh in the office today. Assessment:        History of arrhythmia - 8 second pause during procedure ( anesthesia/L Port placement) 5/12/21   Mobitz 2 type I; 1st deg AV block   Breast CA- S/p breast biopsy and port placement ; S/p Breat lumpectomy; now on chemotherapy (6 cycles of cyclophosphamide, methotrexate, 5-fluorouracil); last treatment 9/28/2021   Event monitor-5/12/2021-6/10/2021-PAC burden 3%, PVC burden 10%. 9 times NSVT ( longest 9 beat run)   Incomplete LBBB on EKG  8/4/22( change noted from prior EKG -increase in QRS duration)          Plan:      Nml EF Oct/21   Alia nuclear study-6/2021-normal   Not on AV tammy blocking agents. 2/12/2022-, HDL 66, , triglycerides 108-will benefit from statins. Advised to discuss with PCP. Patient is hesitant to take any medications. TSH 5.62 ( mildly elevated) 5/12/21 -recheck 6/40/21-within normal limits   Aggressive cardiovascular risk factor modification. Avoid QT prolonging drugs. Follow-up in 6 months/earlier as needed         Patient understands the plan. All questions were answered to the patient's satisfaction. I appreciate the opportunity to be involved in Ms. Rudd. See note below for details. Please do not hesitate to contact us with questions  or concerns. Eloisa Ro MD             Cardiac Testing/ Procedures: A. Cardiac Cath/PCI:      B.ECHO/LIEGHA: 10/7/2021-Limited echo-normal EF-55%/mild MR/mild TR   5/12/21 - Nml EF/Mild MR      C. StressNuclear/Stress ECHO/Stress test: 6/28/2021-normal Alia nuclear

## 2023-05-23 ENCOUNTER — OFFICE VISIT (OUTPATIENT)
Age: 78
End: 2023-05-23
Payer: MEDICARE

## 2023-05-23 VITALS
OXYGEN SATURATION: 98 % | HEIGHT: 65 IN | RESPIRATION RATE: 18 BRPM | SYSTOLIC BLOOD PRESSURE: 124 MMHG | DIASTOLIC BLOOD PRESSURE: 70 MMHG | HEART RATE: 60 BPM | BODY MASS INDEX: 22.33 KG/M2 | WEIGHT: 134 LBS

## 2023-05-23 DIAGNOSIS — I44.0 ATRIOVENTRICULAR BLOCK, FIRST DEGREE: Primary | ICD-10-CM

## 2023-05-23 PROCEDURE — G8399 PT W/DXA RESULTS DOCUMENT: HCPCS | Performed by: INTERNAL MEDICINE

## 2023-05-23 PROCEDURE — G8428 CUR MEDS NOT DOCUMENT: HCPCS | Performed by: INTERNAL MEDICINE

## 2023-05-23 PROCEDURE — 4004F PT TOBACCO SCREEN RCVD TLK: CPT | Performed by: INTERNAL MEDICINE

## 2023-05-23 PROCEDURE — 1123F ACP DISCUSS/DSCN MKR DOCD: CPT | Performed by: INTERNAL MEDICINE

## 2023-05-23 PROCEDURE — 99213 OFFICE O/P EST LOW 20 MIN: CPT | Performed by: INTERNAL MEDICINE

## 2023-05-23 PROCEDURE — 1090F PRES/ABSN URINE INCON ASSESS: CPT | Performed by: INTERNAL MEDICINE

## 2023-05-23 PROCEDURE — G8420 CALC BMI NORM PARAMETERS: HCPCS | Performed by: INTERNAL MEDICINE

## 2023-05-23 NOTE — PROGRESS NOTES
Chief Complaint   Patient presents with    Follow-up    Irregular Heart Beat     History of         Chest Pain No    Palpitations  No    SOB  No    Dizziness  No    Swelling  No    Last Lipids \"Mark the first of the year\"    Refills  NO    /70 (Site: Left Upper Arm, Position: Sitting, Cuff Size: Medium Adult)   Pulse 60   Resp 18   Ht 5' 5\" (1.651 m)   Wt 134 lb (60.8 kg)   SpO2 98%   BMI 22.30 kg/m²       Have you been to the ER, urgent care clinic since your last visit? no  Hospitalized since your last visit? NO    2.  Have you seen or consulted with any other health care providers outside of the [address] [place] [address] since your last visit?  no

## 2023-06-12 DIAGNOSIS — C50.411 MALIGNANT NEOPLASM OF UPPER-OUTER QUADRANT OF RIGHT BREAST IN FEMALE, ESTROGEN RECEPTOR NEGATIVE (HCC): ICD-10-CM

## 2023-06-12 DIAGNOSIS — Z17.1 MALIGNANT NEOPLASM OF UPPER-OUTER QUADRANT OF RIGHT BREAST IN FEMALE, ESTROGEN RECEPTOR NEGATIVE (HCC): ICD-10-CM

## 2023-08-25 ENCOUNTER — HOSPITAL ENCOUNTER (OUTPATIENT)
Facility: HOSPITAL | Age: 78
End: 2023-08-25
Payer: MEDICARE

## 2023-08-25 DIAGNOSIS — R26.89 OTHER ABNORMALITIES OF GAIT AND MOBILITY: ICD-10-CM

## 2023-08-25 PROCEDURE — 70450 CT HEAD/BRAIN W/O DYE: CPT

## 2023-09-14 DIAGNOSIS — R45.86 MOOD SWINGS: ICD-10-CM

## 2023-09-14 DIAGNOSIS — C50.411 MALIGNANT NEOPLASM OF UPPER-OUTER QUADRANT OF RIGHT BREAST IN FEMALE, ESTROGEN RECEPTOR NEGATIVE (HCC): Primary | ICD-10-CM

## 2023-09-14 DIAGNOSIS — Z17.1 MALIGNANT NEOPLASM OF UPPER-OUTER QUADRANT OF RIGHT BREAST IN FEMALE, ESTROGEN RECEPTOR NEGATIVE (HCC): Primary | ICD-10-CM

## 2023-09-14 RX ORDER — ESCITALOPRAM OXALATE 20 MG/1
20 TABLET ORAL DAILY
Qty: 90 TABLET | Refills: 3 | Status: SHIPPED | OUTPATIENT
Start: 2023-09-14

## 2023-12-14 ENCOUNTER — OFFICE VISIT (OUTPATIENT)
Age: 78
End: 2023-12-14
Payer: MEDICARE

## 2023-12-14 VITALS
SYSTOLIC BLOOD PRESSURE: 125 MMHG | OXYGEN SATURATION: 98 % | DIASTOLIC BLOOD PRESSURE: 96 MMHG | WEIGHT: 135 LBS | BODY MASS INDEX: 22.47 KG/M2 | RESPIRATION RATE: 18 BRPM | HEART RATE: 53 BPM

## 2023-12-14 DIAGNOSIS — H53.40 VISUAL FIELD CUT: Primary | ICD-10-CM

## 2023-12-14 DIAGNOSIS — F02.B0 MODERATE LATE ONSET ALZHEIMER'S DEMENTIA WITHOUT BEHAVIORAL DISTURBANCE, PSYCHOTIC DISTURBANCE, MOOD DISTURBANCE, OR ANXIETY (HCC): ICD-10-CM

## 2023-12-14 DIAGNOSIS — G30.1 MODERATE LATE ONSET ALZHEIMER'S DEMENTIA WITHOUT BEHAVIORAL DISTURBANCE, PSYCHOTIC DISTURBANCE, MOOD DISTURBANCE, OR ANXIETY (HCC): ICD-10-CM

## 2023-12-14 PROCEDURE — G8399 PT W/DXA RESULTS DOCUMENT: HCPCS | Performed by: PSYCHIATRY & NEUROLOGY

## 2023-12-14 PROCEDURE — 1036F TOBACCO NON-USER: CPT | Performed by: PSYCHIATRY & NEUROLOGY

## 2023-12-14 PROCEDURE — 99204 OFFICE O/P NEW MOD 45 MIN: CPT | Performed by: PSYCHIATRY & NEUROLOGY

## 2023-12-14 PROCEDURE — 1123F ACP DISCUSS/DSCN MKR DOCD: CPT | Performed by: PSYCHIATRY & NEUROLOGY

## 2023-12-14 PROCEDURE — G8420 CALC BMI NORM PARAMETERS: HCPCS | Performed by: PSYCHIATRY & NEUROLOGY

## 2023-12-14 PROCEDURE — 1090F PRES/ABSN URINE INCON ASSESS: CPT | Performed by: PSYCHIATRY & NEUROLOGY

## 2023-12-14 PROCEDURE — G8427 DOCREV CUR MEDS BY ELIG CLIN: HCPCS | Performed by: PSYCHIATRY & NEUROLOGY

## 2023-12-14 PROCEDURE — G8484 FLU IMMUNIZE NO ADMIN: HCPCS | Performed by: PSYCHIATRY & NEUROLOGY

## 2023-12-29 ENCOUNTER — HOSPITAL ENCOUNTER (OUTPATIENT)
Facility: HOSPITAL | Age: 78
End: 2023-12-29
Attending: PSYCHIATRY & NEUROLOGY
Payer: MEDICARE

## 2023-12-29 DIAGNOSIS — H53.40 VISUAL FIELD CUT: ICD-10-CM

## 2023-12-29 PROCEDURE — 70551 MRI BRAIN STEM W/O DYE: CPT

## 2024-01-09 ENCOUNTER — OFFICE VISIT (OUTPATIENT)
Age: 79
End: 2024-01-09
Payer: MEDICARE

## 2024-01-09 VITALS — RESPIRATION RATE: 20 BRPM | HEART RATE: 74 BPM | OXYGEN SATURATION: 96 %

## 2024-01-09 DIAGNOSIS — G30.1 MODERATE LATE ONSET ALZHEIMER'S DEMENTIA WITHOUT BEHAVIORAL DISTURBANCE, PSYCHOTIC DISTURBANCE, MOOD DISTURBANCE, OR ANXIETY (HCC): Primary | ICD-10-CM

## 2024-01-09 DIAGNOSIS — F02.B0 MODERATE LATE ONSET ALZHEIMER'S DEMENTIA WITHOUT BEHAVIORAL DISTURBANCE, PSYCHOTIC DISTURBANCE, MOOD DISTURBANCE, OR ANXIETY (HCC): Primary | ICD-10-CM

## 2024-01-09 PROCEDURE — 1123F ACP DISCUSS/DSCN MKR DOCD: CPT

## 2024-01-09 PROCEDURE — 1036F TOBACCO NON-USER: CPT

## 2024-01-09 PROCEDURE — 99214 OFFICE O/P EST MOD 30 MIN: CPT

## 2024-01-09 PROCEDURE — G8427 DOCREV CUR MEDS BY ELIG CLIN: HCPCS

## 2024-01-09 PROCEDURE — G8399 PT W/DXA RESULTS DOCUMENT: HCPCS

## 2024-01-09 PROCEDURE — G8420 CALC BMI NORM PARAMETERS: HCPCS

## 2024-01-09 PROCEDURE — G8484 FLU IMMUNIZE NO ADMIN: HCPCS

## 2024-01-09 PROCEDURE — 1090F PRES/ABSN URINE INCON ASSESS: CPT

## 2024-01-09 NOTE — PROGRESS NOTES
Test Results:         MRI brain   Doppler    
        Remainder of comprehensive review is negative.     Physical Exam :    Pulse 74   Resp 20   SpO2 96%     General: Well defined, nourished, and groomed individual in no acute distress.    Neck: Supple, nontender, no bruits, no pain with resistance to active range of motion.    Musculoskeletal: Extremities revealed no edema and had full range of motion of joints.    Psych: Good mood and bright affect      Orders Placed This Encounter    External Referral To Neuropsychology     Referral Priority:   Routine     Referral Type:   Eval and Treat     Referral Reason:   Specialty Services Required     Requested Specialty:   Neuropsychology     Number of Visits Requested:   1        1. Moderate late onset Alzheimer's dementia without behavioral disturbance, psychotic disturbance, mood disturbance, or anxiety (HCC)    We discussed at length the results of the patient's MRI of the brain including what severe microvascular ischemic changes and atrophy of the brain are.  Offered to increase the patient's Aricept from 5 mg daily to 10 mg daily which is the normal dose for the medication however, the patient's  stated that he would like to have the PCP Dr. Mark do this increase instead.    Did refer the patient for neuropsych testing and provided a list of providers in the area that do this testing.  Patient's  states that he would like to have the patient continue follow-ups with PCP rather than neurology unless PCP wants the patient to see neurology going forward.  Follow-up will be as needed.

## 2024-02-20 DIAGNOSIS — Z17.1 MALIGNANT NEOPLASM OF UPPER-OUTER QUADRANT OF RIGHT BREAST IN FEMALE, ESTROGEN RECEPTOR NEGATIVE (HCC): Primary | ICD-10-CM

## 2024-02-20 DIAGNOSIS — C50.411 MALIGNANT NEOPLASM OF UPPER-OUTER QUADRANT OF RIGHT BREAST IN FEMALE, ESTROGEN RECEPTOR NEGATIVE (HCC): Primary | ICD-10-CM

## 2024-04-15 ENCOUNTER — HOSPITAL ENCOUNTER (OUTPATIENT)
Facility: HOSPITAL | Age: 79
Discharge: HOME | End: 2024-04-18
Payer: MEDICARE

## 2024-04-15 DIAGNOSIS — C50.411 MALIGNANT NEOPLASM OF UPPER-OUTER QUADRANT OF RIGHT BREAST IN FEMALE, ESTROGEN RECEPTOR NEGATIVE (HCC): ICD-10-CM

## 2024-04-15 DIAGNOSIS — Z17.1 MALIGNANT NEOPLASM OF UPPER-OUTER QUADRANT OF RIGHT BREAST IN FEMALE, ESTROGEN RECEPTOR NEGATIVE (HCC): ICD-10-CM

## 2024-04-15 PROCEDURE — G0279 TOMOSYNTHESIS, MAMMO: HCPCS

## 2024-04-23 ENCOUNTER — OFFICE VISIT (OUTPATIENT)
Age: 79
End: 2024-04-23
Payer: MEDICARE

## 2024-04-23 VITALS
TEMPERATURE: 98 F | DIASTOLIC BLOOD PRESSURE: 80 MMHG | HEART RATE: 78 BPM | WEIGHT: 130 LBS | BODY MASS INDEX: 21.63 KG/M2 | OXYGEN SATURATION: 98 % | SYSTOLIC BLOOD PRESSURE: 113 MMHG | RESPIRATION RATE: 16 BRPM

## 2024-04-23 DIAGNOSIS — R45.86 MOOD CHANGES: ICD-10-CM

## 2024-04-23 DIAGNOSIS — Z85.3 PERSONAL HISTORY OF BREAST CANCER: Primary | ICD-10-CM

## 2024-04-23 PROCEDURE — G8420 CALC BMI NORM PARAMETERS: HCPCS | Performed by: NURSE PRACTITIONER

## 2024-04-23 PROCEDURE — 1090F PRES/ABSN URINE INCON ASSESS: CPT | Performed by: NURSE PRACTITIONER

## 2024-04-23 PROCEDURE — 99213 OFFICE O/P EST LOW 20 MIN: CPT | Performed by: NURSE PRACTITIONER

## 2024-04-23 PROCEDURE — G2211 COMPLEX E/M VISIT ADD ON: HCPCS | Performed by: NURSE PRACTITIONER

## 2024-04-23 PROCEDURE — 1036F TOBACCO NON-USER: CPT | Performed by: NURSE PRACTITIONER

## 2024-04-23 PROCEDURE — 1123F ACP DISCUSS/DSCN MKR DOCD: CPT | Performed by: NURSE PRACTITIONER

## 2024-04-23 PROCEDURE — G8399 PT W/DXA RESULTS DOCUMENT: HCPCS | Performed by: NURSE PRACTITIONER

## 2024-04-23 PROCEDURE — G8427 DOCREV CUR MEDS BY ELIG CLIN: HCPCS | Performed by: NURSE PRACTITIONER

## 2024-04-23 RX ORDER — MEMANTINE HYDROCHLORIDE 10 MG/1
TABLET ORAL
COMMUNITY
Start: 2024-04-10

## 2024-04-23 RX ORDER — CARBAMAZEPINE 200 MG/1
TABLET ORAL
COMMUNITY
Start: 2024-03-18

## 2024-04-23 NOTE — PROGRESS NOTES
Chasity Cuello is a 78 y.o. female here today to follow up for breast cancer    1. Have you been to the ER, urgent care clinic since your last visit?  Hospitalized since your last visit?no    2. Have you seen or consulted any other health care providers outside of the [facility] since your last visit?  Include any pap smears or colon screening. no    
continuing to worsen.        Signed By: MAU Nieves - NP

## 2024-05-19 NOTE — PROGRESS NOTES
Reinaldo Gilmore MD., [facility]      [address],[facility],[place] [address]      Office [phone],Fax [phone]                 Chasity Cuello is here for a f/u office visit.      Primary care physician:   Michael Mark MD      CC - as documented in EMR      Dear Michael [place] MD      I had the pleasure of seeing Ms.Barbara LIBBY Cuello in the office today.             Assessment:        History of arrhythmia - 8 second pause during procedure ( anesthesia/L Port placement) 5/12/21   Mobitz 2 type I; 1st deg AV block   Breast CA- S/p breast biopsy and port placement ; S/p Breat lumpectomy; now on chemotherapy (6 cycles of cyclophosphamide, methotrexate, 5-fluorouracil); last treatment 9/28/2021   Event monitor-5/12/2021-6/10/2021-PAC burden 3%, PVC burden 10%.  9 times NSVT ( longest 9 beat run)   Incomplete LBBB on EKG  8/4/22( change noted from prior EKG -increase in QRS duration)          Plan:      Nml EF Oct/21   Alia nuclear study-6/2021-normal   Not on AV tammy blocking agents.   2/12/2022-, HDL 66, , triglycerides 108-will benefit from statins.  Advised to discuss with PCP.  Patient is hesitant to take any medications.   TSH 5.62 ( mildly elevated) 5/12/21 -recheck 6/40/21-within normal limits   Aggressive cardiovascular risk factor modification.  Avoid QT prolonging drugs.      Follow-up in 12 months/earlier as needed.  Echocardiogram prior to visit         Patient understands the plan. All questions were answered to the patient's satisfaction.      I appreciate the opportunity to be involved in care. See note below for details. Please do not hesitate to contact us with questions  or concerns.      Reinaldo Gilmore MD             Cardiac Testing/ Procedures:           A.Cardiac Cath/PCI:      B.ECHO/LEIGHA: 10/7/2021-Limited echo-normal EF-55%/mild MR/mild TR   5/12/21 - Nml EF/Mild MR      C.StressNuclear/Stress ECHO/Stress test:

## 2024-05-23 ENCOUNTER — OFFICE VISIT (OUTPATIENT)
Age: 79
End: 2024-05-23
Payer: MEDICARE

## 2024-05-23 VITALS
WEIGHT: 128.6 LBS | SYSTOLIC BLOOD PRESSURE: 100 MMHG | OXYGEN SATURATION: 97 % | BODY MASS INDEX: 21.43 KG/M2 | DIASTOLIC BLOOD PRESSURE: 68 MMHG | HEIGHT: 65 IN | HEART RATE: 64 BPM

## 2024-05-23 DIAGNOSIS — I44.0 ATRIOVENTRICULAR BLOCK, FIRST DEGREE: Primary | ICD-10-CM

## 2024-05-23 DIAGNOSIS — I87.2 CHRONIC VENOUS INSUFFICIENCY: ICD-10-CM

## 2024-05-23 DIAGNOSIS — R94.31 ABNORMAL ELECTROCARDIOGRAPHY: ICD-10-CM

## 2024-05-23 PROCEDURE — G8420 CALC BMI NORM PARAMETERS: HCPCS | Performed by: INTERNAL MEDICINE

## 2024-05-23 PROCEDURE — 93005 ELECTROCARDIOGRAM TRACING: CPT | Performed by: INTERNAL MEDICINE

## 2024-05-23 PROCEDURE — 93010 ELECTROCARDIOGRAM REPORT: CPT | Performed by: INTERNAL MEDICINE

## 2024-05-23 PROCEDURE — 1090F PRES/ABSN URINE INCON ASSESS: CPT | Performed by: INTERNAL MEDICINE

## 2024-05-23 PROCEDURE — 1123F ACP DISCUSS/DSCN MKR DOCD: CPT | Performed by: INTERNAL MEDICINE

## 2024-05-23 PROCEDURE — 1036F TOBACCO NON-USER: CPT | Performed by: INTERNAL MEDICINE

## 2024-05-23 PROCEDURE — 99214 OFFICE O/P EST MOD 30 MIN: CPT | Performed by: INTERNAL MEDICINE

## 2024-05-23 PROCEDURE — G8427 DOCREV CUR MEDS BY ELIG CLIN: HCPCS | Performed by: INTERNAL MEDICINE

## 2024-05-23 PROCEDURE — G8399 PT W/DXA RESULTS DOCUMENT: HCPCS | Performed by: INTERNAL MEDICINE

## 2024-05-23 NOTE — PROGRESS NOTES
Chief Complaint   Patient presents with    Other     AV Block     Vitals:    05/23/24 1054   BP: 100/68   Site: Left Upper Arm   Position: Sitting   Pulse: 64   SpO2: 97%   Weight: 58.3 kg (128 lb 9.6 oz)   Height: 1.651 m (5' 5\")         Chest pain: DENIED     Recent hospital stays: DENIED     Refills: DENIED

## 2024-12-05 ENCOUNTER — TRANSCRIBE ORDERS (OUTPATIENT)
Facility: HOSPITAL | Age: 79
End: 2024-12-05

## 2024-12-05 ENCOUNTER — HOSPITAL ENCOUNTER (OUTPATIENT)
Facility: HOSPITAL | Age: 79
Discharge: HOME | End: 2024-12-08
Payer: MEDICARE

## 2024-12-05 DIAGNOSIS — M25.551 RIGHT HIP PAIN: Primary | ICD-10-CM

## 2024-12-05 DIAGNOSIS — M25.551 RIGHT HIP PAIN: ICD-10-CM

## 2024-12-05 PROCEDURE — 73521 X-RAY EXAM HIPS BI 2 VIEWS: CPT

## 2025-03-06 ENCOUNTER — TRANSCRIBE ORDERS (OUTPATIENT)
Facility: HOSPITAL | Age: 80
End: 2025-03-06

## 2025-03-06 DIAGNOSIS — Z12.31 ENCOUNTER FOR SCREENING MAMMOGRAM FOR MALIGNANT NEOPLASM OF BREAST: ICD-10-CM

## 2025-03-06 DIAGNOSIS — M81.0 AGE-RELATED OSTEOPOROSIS WITHOUT CURRENT PATHOLOGICAL FRACTURE: Primary | ICD-10-CM

## 2025-04-08 ENCOUNTER — TRANSCRIBE ORDERS (OUTPATIENT)
Facility: HOSPITAL | Age: 80
End: 2025-04-08

## 2025-04-08 DIAGNOSIS — Z12.31 OTHER SCREENING MAMMOGRAM: Primary | ICD-10-CM

## 2025-04-23 ENCOUNTER — OFFICE VISIT (OUTPATIENT)
Age: 80
End: 2025-04-23
Payer: MEDICARE

## 2025-04-23 VITALS
HEIGHT: 65 IN | BODY MASS INDEX: 21.33 KG/M2 | WEIGHT: 128 LBS | RESPIRATION RATE: 18 BRPM | HEART RATE: 61 BPM | TEMPERATURE: 99 F | OXYGEN SATURATION: 99 % | SYSTOLIC BLOOD PRESSURE: 118 MMHG | DIASTOLIC BLOOD PRESSURE: 73 MMHG

## 2025-04-23 DIAGNOSIS — Z85.3 PERSONAL HISTORY OF BREAST CANCER: Primary | ICD-10-CM

## 2025-04-23 PROCEDURE — 1036F TOBACCO NON-USER: CPT | Performed by: NURSE PRACTITIONER

## 2025-04-23 PROCEDURE — G8427 DOCREV CUR MEDS BY ELIG CLIN: HCPCS | Performed by: NURSE PRACTITIONER

## 2025-04-23 PROCEDURE — 1126F AMNT PAIN NOTED NONE PRSNT: CPT | Performed by: NURSE PRACTITIONER

## 2025-04-23 PROCEDURE — G8420 CALC BMI NORM PARAMETERS: HCPCS | Performed by: NURSE PRACTITIONER

## 2025-04-23 PROCEDURE — 1159F MED LIST DOCD IN RCRD: CPT | Performed by: NURSE PRACTITIONER

## 2025-04-23 PROCEDURE — 1123F ACP DISCUSS/DSCN MKR DOCD: CPT | Performed by: NURSE PRACTITIONER

## 2025-04-23 PROCEDURE — 99212 OFFICE O/P EST SF 10 MIN: CPT | Performed by: NURSE PRACTITIONER

## 2025-04-23 PROCEDURE — 1090F PRES/ABSN URINE INCON ASSESS: CPT | Performed by: NURSE PRACTITIONER

## 2025-04-23 PROCEDURE — G8399 PT W/DXA RESULTS DOCUMENT: HCPCS | Performed by: NURSE PRACTITIONER

## 2025-04-23 RX ORDER — PRAVASTATIN SODIUM 40 MG
1 TABLET ORAL DAILY
COMMUNITY
Start: 2024-10-24

## 2025-04-23 RX ORDER — LEVOTHYROXINE SODIUM 50 UG/1
50 TABLET ORAL DAILY
COMMUNITY

## 2025-04-23 ASSESSMENT — PATIENT HEALTH QUESTIONNAIRE - PHQ9
2. FEELING DOWN, DEPRESSED OR HOPELESS: SEVERAL DAYS
SUM OF ALL RESPONSES TO PHQ QUESTIONS 1-9: 1
1. LITTLE INTEREST OR PLEASURE IN DOING THINGS: NOT AT ALL

## 2025-04-23 NOTE — PROGRESS NOTES
Chasity Cuello is a 79 y.o. female is here today for a breast cancer follow up.    1. Have you been to the ER, urgent care clinic since your last visit?  Hospitalized since your last visit?Yes When: 12/2024  Where: [facility]  Reason for visit: due to a fall     2. Have you seen or consulted any other health care providers outside of the [facility] since your last visit?  Include any pap smears or colon screening. No    
that the goal of systemic adjuvant therapy is to improve the chances for cure and decrease the risk of relapse. We explained why a patient can have microscopic cancer  spread now even though physical examination, laboratory studies and imaging studies are negative for cancer. We explained that the same treatments used now as adjuvant or preventive treatments rarely if ever are curative in women who develop metastases.       Using eprognosis.org, her 5 year all cause mortality risk is 9-15%; her 10 year all cause mortality risk is 34-43%; her median OS is 12-14 years. An adjuvant discussion is warranted.      s/p IV CMF q 3 weeks x 6.        She presents today for follow up. She is doing well with ALPESH.      Bilateral mammogram 4/15/24 negative. She is no longer following with Dr. Ayoub. Repeat Mammogram ordered and scheduled for 6/6/25.      Referred to [facility] but she did not go to consult. She decided not to have XRT.       Follow-up after early breast cancer was discussed. I recommend follow-up as defined  by the American Society of Clinical Oncology and National Comprehensive Cancer Network. This includes a visit to a health care professional every  3-6 months for 3 years, then every 6-12 months for 2 years, and then yearly as well as mammograms yearly.        2. Emotional well being:  She has excellent support and is coping well with her disease      3. Osteoporosis:  On fosamax and taking mutlitvitamin with vitamin d3 included. This is managed by her PCP.       4. Osteoarthritis: Taking Diclofenac gel or oral PRN.       5. Mood Swings: she is on lexapro 20 mg daily, management per PCP.       7. Itching: generalized with no rash, discussed daily zyrtec and hypoallergenic lotion.     8. Dementia: aricept and namenda per PCP. Seen by Dr. Villasenor and MRI brain showed severe, chronic microvascular ischemic changes and mild to moderate cerebral atrophy. Per  this is continuing to worsen and attempting to obtain

## 2025-05-08 ENCOUNTER — ANCILLARY PROCEDURE (OUTPATIENT)
Age: 80
End: 2025-05-08
Payer: MEDICARE

## 2025-05-08 VITALS
HEART RATE: 62 BPM | SYSTOLIC BLOOD PRESSURE: 120 MMHG | HEIGHT: 65 IN | BODY MASS INDEX: 21.33 KG/M2 | WEIGHT: 128 LBS | DIASTOLIC BLOOD PRESSURE: 70 MMHG

## 2025-05-08 DIAGNOSIS — I44.0 ATRIOVENTRICULAR BLOCK, FIRST DEGREE: ICD-10-CM

## 2025-05-08 DIAGNOSIS — R94.31 ABNORMAL ELECTROCARDIOGRAPHY: ICD-10-CM

## 2025-05-08 DIAGNOSIS — I87.2 CHRONIC VENOUS INSUFFICIENCY: ICD-10-CM

## 2025-05-08 PROCEDURE — 93306 TTE W/DOPPLER COMPLETE: CPT | Performed by: INTERNAL MEDICINE

## 2025-05-16 ENCOUNTER — OFFICE VISIT (OUTPATIENT)
Age: 80
End: 2025-05-16
Payer: MEDICARE

## 2025-05-16 VITALS
WEIGHT: 132 LBS | DIASTOLIC BLOOD PRESSURE: 70 MMHG | SYSTOLIC BLOOD PRESSURE: 120 MMHG | OXYGEN SATURATION: 98 % | HEART RATE: 53 BPM | HEIGHT: 65 IN | RESPIRATION RATE: 16 BRPM | BODY MASS INDEX: 21.99 KG/M2

## 2025-05-16 DIAGNOSIS — I44.0 ATRIOVENTRICULAR BLOCK, FIRST DEGREE: Primary | ICD-10-CM

## 2025-05-16 DIAGNOSIS — R94.31 ABNORMAL ELECTROCARDIOGRAPHY: ICD-10-CM

## 2025-05-16 PROCEDURE — 99214 OFFICE O/P EST MOD 30 MIN: CPT | Performed by: INTERNAL MEDICINE

## 2025-05-16 PROCEDURE — 1090F PRES/ABSN URINE INCON ASSESS: CPT | Performed by: INTERNAL MEDICINE

## 2025-05-16 PROCEDURE — 93005 ELECTROCARDIOGRAM TRACING: CPT | Performed by: INTERNAL MEDICINE

## 2025-05-16 PROCEDURE — G8420 CALC BMI NORM PARAMETERS: HCPCS | Performed by: INTERNAL MEDICINE

## 2025-05-16 PROCEDURE — 1126F AMNT PAIN NOTED NONE PRSNT: CPT | Performed by: INTERNAL MEDICINE

## 2025-05-16 PROCEDURE — 1036F TOBACCO NON-USER: CPT | Performed by: INTERNAL MEDICINE

## 2025-05-16 PROCEDURE — 1123F ACP DISCUSS/DSCN MKR DOCD: CPT | Performed by: INTERNAL MEDICINE

## 2025-05-16 PROCEDURE — G8427 DOCREV CUR MEDS BY ELIG CLIN: HCPCS | Performed by: INTERNAL MEDICINE

## 2025-05-16 PROCEDURE — 93010 ELECTROCARDIOGRAM REPORT: CPT | Performed by: INTERNAL MEDICINE

## 2025-05-16 PROCEDURE — G8399 PT W/DXA RESULTS DOCUMENT: HCPCS | Performed by: INTERNAL MEDICINE

## 2025-05-16 PROCEDURE — 1159F MED LIST DOCD IN RCRD: CPT | Performed by: INTERNAL MEDICINE

## 2025-05-16 PROCEDURE — 1160F RVW MEDS BY RX/DR IN RCRD: CPT | Performed by: INTERNAL MEDICINE

## 2025-05-16 RX ORDER — EZETIMIBE 10 MG/1
10 TABLET ORAL DAILY
COMMUNITY
Start: 2025-03-31

## 2025-05-16 NOTE — PROGRESS NOTES
Reinaldo Gilmore MD., [facility]      [address],[facility],[place] [address]      Office [phone],Fax [phone]                 Chasity Cuello is here for a f/u office visit.      Primary care physician:   Michael Mark MD      CC - as documented in EMR      Dear Michael [place] MD      I had the pleasure of seeing Ms.Chasity Cuello in the office today.             Assessment:        History of arrhythmia - 8 second pause during procedure ( anesthesia/L Port placement) 5/12/21; Hx Mobitz 2 type I; 1st deg AV block; Hx of NSVT     Hx of Breast CA- S/p breast biopsy and port placement ; S/p Breat lumpectomy; now on chemotherapy (6 cycles of cyclophosphamide, methotrexate, 5-fluorouracil); last treatment 9/28/2021    Hx of incompleted LBBB    Intolerant to statins - has tried lipitor/crestor - myalgia/weakness     Dementia       Plan:      Nml EF Oct/21   Alia nuclear study-6/2021-normal   Not on AV tammy blocking agents.  3/6/25 -   , , HDL 76.  - On statin  Discussed with -would avoid invasive procedures.  Continue conservative management     Aggressive cardiovascular risk factor modification.  Avoid QT prolonging drugs.      Follow-up in 12 months/earlier as needed.             Patient understands the plan. All questions were answered to the patient's satisfaction.      I appreciate the opportunity to be involved in care. See note below for details. Please do not hesitate to contact us with questions  or concerns.      Reinaldo Gilmore MD             Cardiac Testing/ Procedures:           A.Cardiac Cath/PCI:      B.ECHO/LEIGHA: 5/8/25  LV: Estimated LVEF is 55 - 60%. Normal cavity size, wall thickness, systolic function (ejection fraction normal) and diastolic function. Wall motion: normal.  · MV: Mild mitral valve regurgitation is present.    10/7/2021-Limited echo-normal EF-55%/mild MR/mild TR   5/12/21 - Nml EF/Mild

## 2025-05-16 NOTE — PROGRESS NOTES
had no chief complaint listed for this encounter.    Vitals:    05/16/25 1457   BP: 120/70   BP Site: Left Upper Arm   Patient Position: Sitting   Pulse: 53   Resp: 16   SpO2: 98%   Weight: 59.9 kg (132 lb)   Height: 1.651 m (5' 5\")        Chest pain No    Refills No        1. Have you been to the ER, urgent care clinic since your last visit? No       Hospitalized since your last visit? No       Where?        Date?

## 2025-05-17 LAB
ECHO AO ROOT DIAM: 3 CM
ECHO AO ROOT INDEX: 1.83 CM/M2
ECHO AV AREA PEAK VELOCITY: 1.5 CM2
ECHO AV AREA VTI: 1.4 CM2
ECHO AV AREA/BSA PEAK VELOCITY: 0.9 CM2/M2
ECHO AV AREA/BSA VTI: 0.9 CM2/M2
ECHO AV MEAN GRADIENT: 6 MMHG
ECHO AV MEAN VELOCITY: 1.2 M/S
ECHO AV PEAK GRADIENT: 9 MMHG
ECHO AV PEAK VELOCITY: 1.5 M/S
ECHO AV VELOCITY RATIO: 0.4
ECHO AV VTI: 32 CM
ECHO BSA: 1.63 M2
ECHO EST RA PRESSURE: 3 MMHG
ECHO LA DIAMETER INDEX: 1.95 CM/M2
ECHO LA DIAMETER: 3.2 CM
ECHO LA TO AORTIC ROOT RATIO: 1.07
ECHO LV E' LATERAL VELOCITY: 8.53 CM/S
ECHO LV E' SEPTAL VELOCITY: 6.51 CM/S
ECHO LV EDV A2C: 46 ML
ECHO LV EDV A4C: 59 ML
ECHO LV EDV BP: 52 ML (ref 56–104)
ECHO LV EDV INDEX A4C: 36 ML/M2
ECHO LV EDV INDEX BP: 32 ML/M2
ECHO LV EDV NDEX A2C: 28 ML/M2
ECHO LV EF PHYSICIAN: 55 %
ECHO LV EJECTION FRACTION A2C: 55 %
ECHO LV EJECTION FRACTION A4C: 58 %
ECHO LV EJECTION FRACTION BIPLANE: 55 % (ref 55–100)
ECHO LV ESV A2C: 21 ML
ECHO LV ESV A4C: 25 ML
ECHO LV ESV BP: 24 ML (ref 19–49)
ECHO LV ESV INDEX A2C: 13 ML/M2
ECHO LV ESV INDEX A4C: 15 ML/M2
ECHO LV ESV INDEX BP: 15 ML/M2
ECHO LV FRACTIONAL SHORTENING: 32 % (ref 28–44)
ECHO LV INTERNAL DIMENSION DIASTOLE INDEX: 2.32 CM/M2
ECHO LV INTERNAL DIMENSION DIASTOLIC: 3.8 CM (ref 3.9–5.3)
ECHO LV INTERNAL DIMENSION SYSTOLIC INDEX: 1.59 CM/M2
ECHO LV INTERNAL DIMENSION SYSTOLIC: 2.6 CM
ECHO LV IVSD: 1 CM (ref 0.6–0.9)
ECHO LV MASS 2D: 117.3 G (ref 67–162)
ECHO LV MASS INDEX 2D: 71.5 G/M2 (ref 43–95)
ECHO LV POSTERIOR WALL DIASTOLIC: 1 CM (ref 0.6–0.9)
ECHO LV RELATIVE WALL THICKNESS RATIO: 0.53
ECHO LVOT AREA: 3.8 CM2
ECHO LVOT AV VTI INDEX: 0.38
ECHO LVOT DIAM: 2.2 CM
ECHO LVOT MEAN GRADIENT: 1 MMHG
ECHO LVOT PEAK GRADIENT: 2 MMHG
ECHO LVOT PEAK VELOCITY: 0.6 M/S
ECHO LVOT STROKE VOLUME INDEX: 28 ML/M2
ECHO LVOT SV: 46 ML
ECHO LVOT VTI: 12.1 CM
ECHO MV A VELOCITY: 0.67 M/S
ECHO MV AREA PHT: 3.4 CM2
ECHO MV E DECELERATION TIME (DT): 219.8 MS
ECHO MV E VELOCITY: 0.84 M/S
ECHO MV E/A RATIO: 1.25
ECHO MV E/E' LATERAL: 9.85
ECHO MV E/E' RATIO (AVERAGED): 11.38
ECHO MV E/E' SEPTAL: 12.9
ECHO MV PRESSURE HALF TIME (PHT): 63.8 MS
ECHO RIGHT VENTRICULAR SYSTOLIC PRESSURE (RVSP): 35 MMHG
ECHO RV FREE WALL PEAK S': 16 CM/S
ECHO RV INTERNAL DIMENSION: 3.3 CM
ECHO RV TAPSE: 2.3 CM (ref 1.7–?)
ECHO TV REGURGITANT MAX VELOCITY: 2.83 M/S
ECHO TV REGURGITANT PEAK GRADIENT: 32 MMHG

## 2025-05-17 PROCEDURE — 93306 TTE W/DOPPLER COMPLETE: CPT | Performed by: INTERNAL MEDICINE

## 2025-06-06 ENCOUNTER — HOSPITAL ENCOUNTER (OUTPATIENT)
Facility: HOSPITAL | Age: 80
Discharge: HOME | End: 2025-06-09
Payer: MEDICARE

## 2025-06-06 VITALS — BODY MASS INDEX: 21.63 KG/M2 | WEIGHT: 130 LBS

## 2025-06-06 DIAGNOSIS — Z85.3 PERSONAL HISTORY OF MALIGNANT NEOPLASM OF BREAST: ICD-10-CM

## 2025-06-06 DIAGNOSIS — M81.0 AGE-RELATED OSTEOPOROSIS WITHOUT CURRENT PATHOLOGICAL FRACTURE: ICD-10-CM

## 2025-06-06 PROCEDURE — G0279 TOMOSYNTHESIS, MAMMO: HCPCS

## 2025-06-06 PROCEDURE — 77080 DXA BONE DENSITY AXIAL: CPT

## (undated) DEVICE — SOL IRRIGATION INJ NACL 0.9% 500ML BTL

## (undated) DEVICE — STERILE POLYISOPRENE POWDER-FREE SURGICAL GLOVES: Brand: PROTEXIS

## (undated) DEVICE — SET GRAV CK VLV NEEDLESS ST 3 GANGED 4WAY STPCOCK HI FLO 10

## (undated) DEVICE — BAG BELONG PT PERS CLEAR HANDL

## (undated) DEVICE — ROCKER SWITCH PENCIL BLADE ELECTRODE, HOLSTER: Brand: EDGE

## (undated) DEVICE — BAG SPEC BIOHZRD 10 X 10 IN --

## (undated) DEVICE — (D)SENSOR RMFG 02 PULS OXMTR -- DISC BY MFR USE ITEM 133445

## (undated) DEVICE — COVER LT HNDL PLAS RIG 1 PER PK

## (undated) DEVICE — SUTURE MCRYL SZ 4-0 L27IN ABSRB UD L19MM PS-2 1/2 CIR PRIM Y426H

## (undated) DEVICE — SOL INJ SOD CL0.9% 10ML PREFIL --

## (undated) DEVICE — CATH IV AUTOGRD BC PNK 20GA 25 -- INSYTE

## (undated) DEVICE — FORCEPS BX L240CM JAW DIA2.8MM L CAP W/ NDL MIC MESH TOOTH

## (undated) DEVICE — DECANTER BAG 9": Brand: MEDLINE INDUSTRIES, INC.

## (undated) DEVICE — SYR 10ML LUER LOK 1/5ML GRAD --

## (undated) DEVICE — ESOPHAGEAL BALLOON DILATATION CATHETER: Brand: CRE FIXED WIRE

## (undated) DEVICE — Device

## (undated) DEVICE — HYPODERMIC SAFETY NEEDLE: Brand: MONOJECT

## (undated) DEVICE — NDL FLTR TIP 5 MIC 18GX1.5IN --

## (undated) DEVICE — INFECTION CONTROL KIT SYS

## (undated) DEVICE — 1200 GUARD II KIT W/5MM TUBE W/O VAC TUBE: Brand: GUARDIAN

## (undated) DEVICE — CHEST PACK: Brand: MEDLINE INDUSTRIES, INC.

## (undated) DEVICE — SUT SLK 2-0SH 30IN BLK --

## (undated) DEVICE — CANNULA CUSH AD W/ 14FT TBG

## (undated) DEVICE — SOL INJ SOD CL 0.9% 500ML BG --

## (undated) DEVICE — KIT COLON W/ 1.1OZ LUB AND 2 END

## (undated) DEVICE — CANISTER, RIGID, 3000CC: Brand: MEDLINE INDUSTRIES, INC.

## (undated) DEVICE — 3M™ CUROS™ DISINFECTING CAP FOR NEEDLELESS CONNECTORS 270/CARTON 20 CARTONS/CASE CFF1-270: Brand: CUROS™

## (undated) DEVICE — SUTURE VCRL SZ 3-0 L27IN ABSRB UD L26MM SH 1/2 CIR J416H

## (undated) DEVICE — ELECTRODE,RADIOTRANSLUCENT,FOAM,3PK: Brand: MEDLINE

## (undated) DEVICE — C-ARM: Brand: UNBRANDED

## (undated) DEVICE — COVER US PRB W15XL120CM W/ GEL RUBBERBAND TAPE STRP FLD GEN

## (undated) DEVICE — GLOVE SURG SZ 65 THK91MIL LTX FREE SYN POLYISOPRENE

## (undated) DEVICE — STRAP,POSITIONING,KNEE/BODY,FOAM,4X60": Brand: MEDLINE

## (undated) DEVICE — CUFF RMFG BP INF SZ 11 DISP -- LAWSON OEM ITEM 238915

## (undated) DEVICE — DERMABOND SKIN ADH 0.7ML -- DERMABOND ADVANCED 12/BX

## (undated) DEVICE — BITEBLOCK ENDOSCP 60FR MAXI WHT POLYETH STURDY W/ VELC WVN

## (undated) DEVICE — SOLIDIFIER MEDC 1200ML -- CONVERT TO 356117

## (undated) DEVICE — NEEDLE HYPO 25GA L1.5IN BVL ORIENTED ECLIPSE

## (undated) DEVICE — CHEST PACK-SFMCASU: Brand: MEDLINE INDUSTRIES, INC.

## (undated) DEVICE — INTENDED FOR TISSUE SEPARATION, AND OTHER PROCEDURES THAT REQUIRE A SHARP SURGICAL BLADE TO PUNCTURE OR CUT.: Brand: BARD-PARKER ® CARBON RIB-BACK BLADES

## (undated) DEVICE — CONTAINER SPEC 20 ML LID NEUT BUFF FORMALIN 10 % POLYPR STS